# Patient Record
Sex: MALE | Race: WHITE | Employment: OTHER | URBAN - METROPOLITAN AREA
[De-identification: names, ages, dates, MRNs, and addresses within clinical notes are randomized per-mention and may not be internally consistent; named-entity substitution may affect disease eponyms.]

---

## 2021-11-13 ENCOUNTER — APPOINTMENT (OUTPATIENT)
Dept: CT IMAGING | Age: 76
DRG: 389 | End: 2021-11-13
Payer: MEDICARE

## 2021-11-13 ENCOUNTER — HOSPITAL ENCOUNTER (INPATIENT)
Age: 76
LOS: 4 days | Discharge: HOME | DRG: 389 | End: 2021-11-17
Attending: STUDENT IN AN ORGANIZED HEALTH CARE EDUCATION/TRAINING PROGRAM | Admitting: STUDENT IN AN ORGANIZED HEALTH CARE EDUCATION/TRAINING PROGRAM
Payer: MEDICARE

## 2021-11-13 ENCOUNTER — APPOINTMENT (OUTPATIENT)
Dept: GENERAL RADIOLOGY | Age: 76
DRG: 389 | End: 2021-11-13
Payer: MEDICARE

## 2021-11-13 DIAGNOSIS — K56.2 SIGMOID VOLVULUS (HCC): Primary | ICD-10-CM

## 2021-11-13 LAB
A/G RATIO: 1 (ref 1.1–2.2)
ALBUMIN SERPL-MCNC: 4.1 G/DL (ref 3.4–5)
ALP BLD-CCNC: 142 U/L (ref 40–129)
ALT SERPL-CCNC: 6 U/L (ref 10–40)
ANION GAP SERPL CALCULATED.3IONS-SCNC: 14 MMOL/L (ref 3–16)
AST SERPL-CCNC: 10 U/L (ref 15–37)
BASOPHILS ABSOLUTE: 0.1 K/UL (ref 0–0.2)
BASOPHILS RELATIVE PERCENT: 0.5 %
BILIRUB SERPL-MCNC: 0.5 MG/DL (ref 0–1)
BILIRUBIN URINE: NEGATIVE
BLOOD, URINE: ABNORMAL
BUN BLDV-MCNC: 27 MG/DL (ref 7–20)
CALCIUM SERPL-MCNC: 9.6 MG/DL (ref 8.3–10.6)
CHLORIDE BLD-SCNC: 107 MMOL/L (ref 99–110)
CLARITY: CLEAR
CO2: 17 MMOL/L (ref 21–32)
COLOR: YELLOW
COMMENT UA: ABNORMAL
CREAT SERPL-MCNC: 1.9 MG/DL (ref 0.8–1.3)
EOSINOPHILS ABSOLUTE: 0.1 K/UL (ref 0–0.6)
EOSINOPHILS RELATIVE PERCENT: 1 %
EPITHELIAL CELLS, UA: 1 /HPF (ref 0–5)
GFR AFRICAN AMERICAN: 42
GFR NON-AFRICAN AMERICAN: 34
GLUCOSE BLD-MCNC: 108 MG/DL (ref 70–99)
GLUCOSE BLD-MCNC: 120 MG/DL (ref 70–99)
GLUCOSE BLD-MCNC: 123 MG/DL (ref 70–99)
GLUCOSE BLD-MCNC: 92 MG/DL (ref 70–99)
GLUCOSE URINE: NEGATIVE MG/DL
HCT VFR BLD CALC: 32.4 % (ref 40.5–52.5)
HEMOGLOBIN: 10.6 G/DL (ref 13.5–17.5)
HYALINE CASTS: 12 /LPF (ref 0–8)
KETONES, URINE: NEGATIVE MG/DL
LEUKOCYTE ESTERASE, URINE: ABNORMAL
LYMPHOCYTES ABSOLUTE: 1.4 K/UL (ref 1–5.1)
LYMPHOCYTES RELATIVE PERCENT: 14 %
MAGNESIUM: 1.9 MG/DL (ref 1.8–2.4)
MCH RBC QN AUTO: 29.1 PG (ref 26–34)
MCHC RBC AUTO-ENTMCNC: 32.7 G/DL (ref 31–36)
MCV RBC AUTO: 89.2 FL (ref 80–100)
MICROSCOPIC EXAMINATION: YES
MONOCYTES ABSOLUTE: 0.6 K/UL (ref 0–1.3)
MONOCYTES RELATIVE PERCENT: 6.3 %
NEUTROPHILS ABSOLUTE: 7.8 K/UL (ref 1.7–7.7)
NEUTROPHILS RELATIVE PERCENT: 78.2 %
NITRITE, URINE: NEGATIVE
PDW BLD-RTO: 15.5 % (ref 12.4–15.4)
PERFORMED ON: ABNORMAL
PERFORMED ON: ABNORMAL
PERFORMED ON: NORMAL
PH UA: 5 (ref 5–8)
PLATELET # BLD: 219 K/UL (ref 135–450)
PMV BLD AUTO: 9.1 FL (ref 5–10.5)
POTASSIUM REFLEX MAGNESIUM: 3.3 MMOL/L (ref 3.5–5.1)
PROTEIN UA: 100 MG/DL
RBC # BLD: 3.63 M/UL (ref 4.2–5.9)
RBC UA: 1 /HPF (ref 0–4)
SARS-COV-2, NAAT: NOT DETECTED
SODIUM BLD-SCNC: 138 MMOL/L (ref 136–145)
SPECIFIC GRAVITY UA: 1.02 (ref 1–1.03)
TOTAL PROTEIN: 8.1 G/DL (ref 6.4–8.2)
TROPONIN: <0.01 NG/ML
URINE REFLEX TO CULTURE: YES
URINE TYPE: ABNORMAL
UROBILINOGEN, URINE: 0.2 E.U./DL
WBC # BLD: 10 K/UL (ref 4–11)
WBC UA: 20 /HPF (ref 0–5)

## 2021-11-13 PROCEDURE — 99153 MOD SED SAME PHYS/QHP EA: CPT | Performed by: INTERNAL MEDICINE

## 2021-11-13 PROCEDURE — 93005 ELECTROCARDIOGRAM TRACING: CPT | Performed by: STUDENT IN AN ORGANIZED HEALTH CARE EDUCATION/TRAINING PROGRAM

## 2021-11-13 PROCEDURE — 81001 URINALYSIS AUTO W/SCOPE: CPT

## 2021-11-13 PROCEDURE — 2580000003 HC RX 258: Performed by: PHYSICIAN ASSISTANT

## 2021-11-13 PROCEDURE — 2580000003 HC RX 258: Performed by: STUDENT IN AN ORGANIZED HEALTH CARE EDUCATION/TRAINING PROGRAM

## 2021-11-13 PROCEDURE — 99284 EMERGENCY DEPT VISIT MOD MDM: CPT

## 2021-11-13 PROCEDURE — 36415 COLL VENOUS BLD VENIPUNCTURE: CPT

## 2021-11-13 PROCEDURE — 1200000000 HC SEMI PRIVATE

## 2021-11-13 PROCEDURE — 80053 COMPREHEN METABOLIC PANEL: CPT

## 2021-11-13 PROCEDURE — 3609027000 HC COLONOSCOPY: Performed by: INTERNAL MEDICINE

## 2021-11-13 PROCEDURE — 87635 SARS-COV-2 COVID-19 AMP PRB: CPT

## 2021-11-13 PROCEDURE — 71260 CT THORAX DX C+: CPT

## 2021-11-13 PROCEDURE — 83735 ASSAY OF MAGNESIUM: CPT

## 2021-11-13 PROCEDURE — 6360000002 HC RX W HCPCS: Performed by: INTERNAL MEDICINE

## 2021-11-13 PROCEDURE — 7100000010 HC PHASE II RECOVERY - FIRST 15 MIN: Performed by: INTERNAL MEDICINE

## 2021-11-13 PROCEDURE — 6360000002 HC RX W HCPCS: Performed by: STUDENT IN AN ORGANIZED HEALTH CARE EDUCATION/TRAINING PROGRAM

## 2021-11-13 PROCEDURE — 2709999900 HC NON-CHARGEABLE SUPPLY: Performed by: INTERNAL MEDICINE

## 2021-11-13 PROCEDURE — 7100000011 HC PHASE II RECOVERY - ADDTL 15 MIN: Performed by: INTERNAL MEDICINE

## 2021-11-13 PROCEDURE — 6360000004 HC RX CONTRAST MEDICATION: Performed by: PHYSICIAN ASSISTANT

## 2021-11-13 PROCEDURE — 71045 X-RAY EXAM CHEST 1 VIEW: CPT

## 2021-11-13 PROCEDURE — 87086 URINE CULTURE/COLONY COUNT: CPT

## 2021-11-13 PROCEDURE — 84484 ASSAY OF TROPONIN QUANT: CPT

## 2021-11-13 PROCEDURE — 99152 MOD SED SAME PHYS/QHP 5/>YRS: CPT | Performed by: INTERNAL MEDICINE

## 2021-11-13 PROCEDURE — 0DJD8ZZ INSPECTION OF LOWER INTESTINAL TRACT, VIA NATURAL OR ARTIFICIAL OPENING ENDOSCOPIC: ICD-10-PCS | Performed by: INTERNAL MEDICINE

## 2021-11-13 PROCEDURE — 85025 COMPLETE CBC W/AUTO DIFF WBC: CPT

## 2021-11-13 RX ORDER — SODIUM CHLORIDE 0.9 % (FLUSH) 0.9 %
5-40 SYRINGE (ML) INJECTION PRN
Status: DISCONTINUED | OUTPATIENT
Start: 2021-11-13 | End: 2021-11-17 | Stop reason: HOSPADM

## 2021-11-13 RX ORDER — SERTRALINE HYDROCHLORIDE 25 MG/1
25 TABLET, FILM COATED ORAL DAILY
Status: ON HOLD | COMMUNITY
End: 2021-12-14

## 2021-11-13 RX ORDER — GLIPIZIDE 2.5 MG/1
2.5 TABLET, EXTENDED RELEASE ORAL DAILY
Status: ON HOLD | COMMUNITY
End: 2021-11-15 | Stop reason: ALTCHOICE

## 2021-11-13 RX ORDER — NICOTINE POLACRILEX 4 MG
15 LOZENGE BUCCAL PRN
Status: DISCONTINUED | OUTPATIENT
Start: 2021-11-13 | End: 2021-11-17 | Stop reason: HOSPADM

## 2021-11-13 RX ORDER — HEPARIN SODIUM 5000 [USP'U]/ML
5000 INJECTION, SOLUTION INTRAVENOUS; SUBCUTANEOUS EVERY 8 HOURS SCHEDULED
Status: DISCONTINUED | OUTPATIENT
Start: 2021-11-13 | End: 2021-11-17 | Stop reason: HOSPADM

## 2021-11-13 RX ORDER — MIDAZOLAM HYDROCHLORIDE 1 MG/ML
INJECTION INTRAMUSCULAR; INTRAVENOUS PRN
Status: DISCONTINUED | OUTPATIENT
Start: 2021-11-13 | End: 2021-11-13 | Stop reason: ALTCHOICE

## 2021-11-13 RX ORDER — FENTANYL CITRATE 50 UG/ML
INJECTION, SOLUTION INTRAMUSCULAR; INTRAVENOUS PRN
Status: DISCONTINUED | OUTPATIENT
Start: 2021-11-13 | End: 2021-11-13 | Stop reason: ALTCHOICE

## 2021-11-13 RX ORDER — SODIUM CHLORIDE 0.9 % (FLUSH) 0.9 %
5-40 SYRINGE (ML) INJECTION EVERY 12 HOURS SCHEDULED
Status: DISCONTINUED | OUTPATIENT
Start: 2021-11-13 | End: 2021-11-17 | Stop reason: HOSPADM

## 2021-11-13 RX ORDER — ACETAMINOPHEN 325 MG/1
650 TABLET ORAL EVERY 6 HOURS PRN
Status: DISCONTINUED | OUTPATIENT
Start: 2021-11-13 | End: 2021-11-17 | Stop reason: HOSPADM

## 2021-11-13 RX ORDER — ONDANSETRON 2 MG/ML
4 INJECTION INTRAMUSCULAR; INTRAVENOUS EVERY 6 HOURS PRN
Status: DISCONTINUED | OUTPATIENT
Start: 2021-11-13 | End: 2021-11-17 | Stop reason: HOSPADM

## 2021-11-13 RX ORDER — DEXTROSE MONOHYDRATE 50 MG/ML
100 INJECTION, SOLUTION INTRAVENOUS PRN
Status: DISCONTINUED | OUTPATIENT
Start: 2021-11-13 | End: 2021-11-17 | Stop reason: HOSPADM

## 2021-11-13 RX ORDER — 0.9 % SODIUM CHLORIDE 0.9 %
1000 INTRAVENOUS SOLUTION INTRAVENOUS ONCE
Status: COMPLETED | OUTPATIENT
Start: 2021-11-13 | End: 2021-11-13

## 2021-11-13 RX ORDER — DEXTROSE MONOHYDRATE 25 G/50ML
12.5 INJECTION, SOLUTION INTRAVENOUS PRN
Status: DISCONTINUED | OUTPATIENT
Start: 2021-11-13 | End: 2021-11-17 | Stop reason: HOSPADM

## 2021-11-13 RX ORDER — ALLOPURINOL 100 MG/1
100 TABLET ORAL DAILY
COMMUNITY

## 2021-11-13 RX ORDER — SODIUM CHLORIDE 9 MG/ML
25 INJECTION, SOLUTION INTRAVENOUS PRN
Status: DISCONTINUED | OUTPATIENT
Start: 2021-11-13 | End: 2021-11-17 | Stop reason: HOSPADM

## 2021-11-13 RX ORDER — ACETAMINOPHEN 650 MG/1
650 SUPPOSITORY RECTAL EVERY 6 HOURS PRN
Status: DISCONTINUED | OUTPATIENT
Start: 2021-11-13 | End: 2021-11-17 | Stop reason: HOSPADM

## 2021-11-13 RX ORDER — ONDANSETRON 4 MG/1
4 TABLET, ORALLY DISINTEGRATING ORAL EVERY 8 HOURS PRN
Status: DISCONTINUED | OUTPATIENT
Start: 2021-11-13 | End: 2021-11-17 | Stop reason: HOSPADM

## 2021-11-13 RX ORDER — SODIUM CHLORIDE, SODIUM LACTATE, POTASSIUM CHLORIDE, CALCIUM CHLORIDE 600; 310; 30; 20 MG/100ML; MG/100ML; MG/100ML; MG/100ML
INJECTION, SOLUTION INTRAVENOUS CONTINUOUS
Status: DISCONTINUED | OUTPATIENT
Start: 2021-11-13 | End: 2021-11-15

## 2021-11-13 RX ORDER — POLYETHYLENE GLYCOL 3350 17 G/17G
17 POWDER, FOR SOLUTION ORAL DAILY PRN
Status: DISCONTINUED | OUTPATIENT
Start: 2021-11-13 | End: 2021-11-16

## 2021-11-13 RX ORDER — OMEPRAZOLE 20 MG/1
20 CAPSULE, DELAYED RELEASE ORAL DAILY
Status: ON HOLD | COMMUNITY
End: 2022-09-28 | Stop reason: HOSPADM

## 2021-11-13 RX ORDER — ATORVASTATIN CALCIUM 20 MG/1
20 TABLET, FILM COATED ORAL EVERY EVENING
COMMUNITY

## 2021-11-13 RX ADMIN — SODIUM CHLORIDE 1000 ML: 9 INJECTION, SOLUTION INTRAVENOUS at 10:09

## 2021-11-13 RX ADMIN — HEPARIN SODIUM 5000 UNITS: 5000 INJECTION INTRAVENOUS; SUBCUTANEOUS at 20:39

## 2021-11-13 RX ADMIN — IOPAMIDOL 75 ML: 755 INJECTION, SOLUTION INTRAVENOUS at 09:31

## 2021-11-13 RX ADMIN — SODIUM CHLORIDE, POTASSIUM CHLORIDE, SODIUM LACTATE AND CALCIUM CHLORIDE: 600; 310; 30; 20 INJECTION, SOLUTION INTRAVENOUS at 18:50

## 2021-11-13 ASSESSMENT — PAIN DESCRIPTION - DESCRIPTORS
DESCRIPTORS: TIGHTNESS
DESCRIPTORS: TIGHTNESS

## 2021-11-13 ASSESSMENT — PAIN SCALES - WONG BAKER
WONGBAKER_NUMERICALRESPONSE: 0

## 2021-11-13 ASSESSMENT — PAIN DESCRIPTION - PAIN TYPE
TYPE: ACUTE PAIN
TYPE: ACUTE PAIN

## 2021-11-13 ASSESSMENT — PAIN DESCRIPTION - ONSET
ONSET: ON-GOING
ONSET: ON-GOING

## 2021-11-13 ASSESSMENT — PAIN DESCRIPTION - PROGRESSION
CLINICAL_PROGRESSION: NOT CHANGED
CLINICAL_PROGRESSION: NOT CHANGED

## 2021-11-13 ASSESSMENT — PAIN - FUNCTIONAL ASSESSMENT
PAIN_FUNCTIONAL_ASSESSMENT: ACTIVITIES ARE NOT PREVENTED
PAIN_FUNCTIONAL_ASSESSMENT: ACTIVITIES ARE NOT PREVENTED

## 2021-11-13 ASSESSMENT — PAIN DESCRIPTION - FREQUENCY
FREQUENCY: CONTINUOUS
FREQUENCY: CONTINUOUS

## 2021-11-13 ASSESSMENT — PAIN DESCRIPTION - LOCATION
LOCATION: CHEST
LOCATION: CHEST;ABDOMEN

## 2021-11-13 ASSESSMENT — PAIN SCALES - GENERAL
PAINLEVEL_OUTOF10: 8
PAINLEVEL_OUTOF10: 8
PAINLEVEL_OUTOF10: 0

## 2021-11-13 NOTE — ED NOTES
Pt presents to ED from home for complaints of chest pain, left ribcage area, on and off for past couple of days. Pt rates pain 8/10. Pt states that when he pushes on his hernia the pain feels better. Pt does have a hx of stroke and takes eliquis, right sided deficits. Pt is alert and oriented x4. No signs of acute distress noted. Call light within reach.       Dave Tran RN  11/13/21 0615

## 2021-11-13 NOTE — CONSULTS
Gastroenterology Consult Note                          Patient: Hoy Runner  : 1945  CSN#:      Date:  2021    Subjective:       History of Present Illness  Patient is a 64 y.o.  male admitted with Sigmoid volvulus ([facility] [K56.2] who is seen in consult for same. Patient with abd pain over last few days. Still having bowel movements up until yesterday. No previous colonoscopy. No bad surgery  Prior CVA. No constipation. Past Medical History:   Diagnosis Date    Cerebral artery occlusion with cerebral infarction ([facility]     Diabetes mellitus ([facility]     Hypertension       History reviewed. No pertinent surgical history. Past Endoscopic History: none    Admission Meds  No current facility-administered medications on file prior to encounter. Current Outpatient Medications on File Prior to Encounter   Medication Sig Dispense Refill    allopurinol (ZYLOPRIM) 100 MG tablet Take 100 mg by mouth daily      apixaban (ELIQUIS) 5 MG TABS tablet Take 5 mg by mouth 2 times daily      atorvastatin (LIPITOR) 40 MG tablet Take 40 mg by mouth daily      Cholecalciferol 50 MCG ( UT) TABS Take 50 mcg by mouth daily      glipiZIDE (GLUCOTROL XL) 2.5 MG extended release tablet Take 2.5 mg by mouth daily      omeprazole (PRILOSEC) 20 MG delayed release capsule Take 20 mg by mouth daily      sertraline (ZOLOFT) 25 MG tablet Take 25 mg by mouth daily             Allergies  No Known Allergies   Social   Social History     Tobacco Use    Smoking status: Never Smoker    Smokeless tobacco: Never Used   Substance Use Topics    Alcohol use: Not Currently        History reviewed. No pertinent family history. No family history of colon cancer, Crohn's disease, or ulcerative colitis. Review of Systems  Pertinent items are noted in HPI. Physical Exam  Blood pressure (!) 178/84, pulse 65, temperature 97.7 °F (36.5 °C), temperature source Oral, resp.  rate 16, weight 206 lb 5.6 oz (93.6 kg), SpO2 99 %. General appearance: alert, cooperative, no distress, appears stated age  Eyes: Anicteric  Head: Normocephalic, without obvious abnormality  Lungs: clear to auscultation bilaterally, Normal Effort  Heart: regular rate and rhythm, normal S1 and S2, no murmurs or rubs  Abdomen: soft, non-tender. Bowel sounds normal. No masses,  no organomegaly. Distention. Extremities: atraumatic, no cyanosis or edema  Skin: warm and dry, no jaundice  Neuro: Grossly intact, A&OX3, right hemiparesis      Data Review:    Recent Labs     11/13/21 0752   WBC 10.0   HGB 10.6*   HCT 32.4*   MCV 89.2        Recent Labs     11/13/21 0752      K 3.3*      CO2 17*   BUN 27*   CREATININE 1.9*     Recent Labs     11/13/21 0752   AST 10*   ALT 6*   BILITOT 0.5   ALKPHOS 142*     No results for input(s): LIPASE, AMYLASE in the last 72 hours. No results for input(s): PROTIME, INR in the last 72 hours. No results for input(s): PTT in the last 72 hours. No results for input(s): OCCULTBLD in the last 72 hours. CT:    1. No acute cardiopulmonary process.  Opacity seen on the chest x-ray for   caused by linear scarring in the right middle lobe and lingula   2. Sigmoid volvulus   3. Left adrenal nodule, likely adenoma in the absence of known malignancy                    Assessment:     Active Problems:    Sigmoid volvulus ([address]  Resolved Problems:    * No resolved hospital problems. *    Sigmoid volvulus    Recommendations:     Endoscopic management attempt with colonoscopy emergently. Discussed that the volvulus may recur and may need surgery ultimately.     Lynnette Christianson MD  [address] [facility] and [address] [place] [address]

## 2021-11-13 NOTE — H&P
Hospital Medicine History & Physical      PCP: Augustina Bernal, APRN - CNP    Date of Admission: 11/13/2021    Date of Service: Pt seen/examined on 11/13/21   and Admitted to Inpatient/. Chief Complaint:  Abdominal pain. History Of Present Illness: The patient is a 64 y.o. male who presents to [facility] with abdominal pain. 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 Presented with worsening abdominal pain for the past 3 days, patient describes colicky severe pain reaching 9/10 in severity, worse in the mid abdomen, associated with diarrhea and had been progressively worse over the last 3 days, no relieving factors, no blood noted in the stool, no nausea or vomiting, patient has no previous attacks of similar symptoms. Patient stated that he had been wheelchair-bound since  suffering from a stroke, lives with a friend that helps with his daily needs. Past Medical History:        Diagnosis Date    Cerebral artery occlusion with cerebral infarction ([facility]     Diabetes mellitus ([facility]     Hypertension        Past Surgical History:    History reviewed. No pertinent surgical history. Medications Prior to Admission:    Prior to Admission medications    Medication Sig Start Date End Date Taking?  Authorizing Provider   allopurinol (ZYLOPRIM) 100 MG tablet Take 100 mg by mouth daily    Historical Provider, MD   apixaban (ELIQUIS) 5 MG TABS tablet Take 5 mg by mouth 2 times daily    Historical Provider, MD   atorvastatin (LIPITOR) 40 MG tablet Take 40 mg by mouth daily    Historical Provider, MD   Cholecalciferol 50 MCG (2000 UT) TABS Take 50 mcg by mouth daily    Historical Provider, MD   glipiZIDE (GLUCOTROL XL) 2.5 MG extended release tablet Take 2.5 mg by mouth daily    Historical Provider, MD   omeprazole (PRILOSEC) 20 MG delayed release capsule Take 20 mg by mouth daily    Historical Provider, MD   sertraline (ZOLOFT) 25 MG tablet Take 25 mg by mouth daily    Historical Provider, MD       Allergies:  Patient has no known allergies. Social History:  The patient currently lives with a friend, wheelchair-bound at baseline    TOBACCO:   reports that he has never smoked. He has never used smokeless tobacco.  ETOH:   reports previous alcohol use. Family History:  Reviewed in detail and negative for DM, Early CAD, Cancer, CVA. Positive as follows:    History reviewed. No pertinent family history. REVIEW OF SYSTEMS:   Positive for abdominal pain, diarrhea and as noted in the HPI. All other systems reviewed and negative. PHYSICAL EXAM:    BP (!) 178/84   Pulse 65   Temp 97.7 °F (36.5 °C) (Oral)   Resp 16   Wt 206 lb 5.6 oz (93.6 kg)   SpO2 99%     General appearance: Seems uncomfortable  HEENT Normal cephalic, atraumatic without obvious deformity. Pupils equal, round, and reactive to light. Extra ocular muscles intact. Conjunctivae/corneas clear. Neck: Supple, No jugular venous distention/bruits. Trachea midline without thyromegaly or adenopathy with full range of motion. Lungs: Clear to auscultation, bilaterally without Rales/Wheezes/Rhonchi with good respiratory effort. Heart: Regular rate and rhythm with Normal S1/S2 without murmurs, rubs or gallops, point of maximum impulse non-displaced  Abdomen: Tympanic percussion note, tenderness most apparent but umbilical area  Extremities: Contractures noted in the right upper limb with muscle atrophy in the right lower limb. Skin: Skin color, texture, turgor normal.  No rashes or lesions. Neurologic: baseline right sided weakness. Mental status: Alert, oriented, thought content appropriate.   Capillary Refill: Acceptable  < 3 seconds  Peripheral Pulses: +3 Easily felt, not easily obliterated with pressure      CXR:  I have reviewed the CXR with the following interpretation: gas distention in the abdomen noted. CBC   Recent Labs     11/13/21 0752   WBC 10.0   HGB 10.6*   HCT 32.4*         RENAL  Recent Labs     11/13/21 0752      K 3.3*      CO2 17*   BUN 27*   CREATININE 1.9*     LFT'S  Recent Labs     11/13/21 0752   AST 10*   ALT 6*   BILITOT 0.5   ALKPHOS 142*     COAG  No results for input(s): INR in the last 72 hours. CARDIAC ENZYMES  Recent Labs     11/13/21 0752   TROPONINI <0.01       U/A:  No results found for: NITRITE, COLORU, WBCUA, RBCUA, MUCUS, BACTERIA, CLARITYU, SPECGRAV, LEUKOCYTESUR, BLOODU, GLUCOSEU, AMORPHOUS    ABG  No results found for: BBV3FAI, BEART, C8YIWEIE, PHART, THGBART, RQL1AIB, PO2ART, UQH8PAZ        There are no active hospital problems to display for this patient. PHYSICIANS CERTIFICATION:    I certify that Eva Villalpando is expected to be hospitalized for more  than 2 midnights based on the following assessment and plan:      ASSESSMENT/PLAN:    Abdominal pain   Sigmoid volvulus. Patient presented to emergency with progressively worsening abdominal pain, patient reported diarrhea however no nausea and vomiting. Upon presentation patient was found to be vitally stable. Exam with abdominal tenderness. CT scan showed sigmoid volvulus, general surgery and gastroenterology involved. Plan. -Appreciate gastroenterology input.  -Appreciate general surgery input. Plan-keep patient n.p.o.   -Hydration with LR    AF. On Eliquis, will hold for possible surgery. Patient has not taken any of his medications for the past 3 days. DM. Hold home glipizide. Insulin sliding scale. Gout. Hold allopurinol. Hyperlipidemia. Hold atorvastatin. Depression. Hold sertraline while patient n.p.o. History of CVA. residual right sided weakness, wheel chair bound.      DVT Prophylaxis: heparin  Diet: No diet orders on file  Code Status: No Order  PT/OT Eval Status: pending clinical improvement      Dispo - pending clinical improvement         Suzette Peralta MD    Thank you OBI Hwang CNP for the opportunity to be involved in this patient's care. If you have any questions or concerns please feel free to contact me at [phone].

## 2021-11-13 NOTE — ED PROVIDER NOTES
[address] [facility]        Pt Name: Beverly Collins  MRN: [id number]  [place] 1945  Date of evaluation: 11/13/2021  Provider: BARRON Chamberlain  PCP: OBI Kam CNP  Note Started: 9:05 AM EST     The ED Attending Physician was available for consultation but did not see or evaluate this patient. CHIEF COMPLAINT       Chief Complaint   Patient presents with    Chest Pain     past couple of days/ on and off/ pt states that when he pushes on hernia, pain is relieved       HISTORY OF PRESENT ILLNESS   (Location, Timing/Onset, Context/Setting, Quality, Duration, Modifying Factors, Severity, Associated Signs and Symptoms)  Note limiting factors. Chief Complaint: Abdominal pain and distention    Beverly Collins is a 64 y.o. male who presents with complaints of abdominal pain. Patient says the pain is in his abdomen, not his chest, and comes and goes over the last 3 days. He says it is unpredictable, and he has not noticed any exacerbating or alleviating factors. Says he has been eating normally. No nausea or vomiting. He has been having some diarrhea but no blood in it. Denies difficulty breathing, cough, cold symptoms, fever. Says he is urinating normally. Says he does have a hernia in his abdomen that has been there for a long time, never had surgery for it, and it has not really bothered him until recently. Nursing Notes were all reviewed and agreed with or any disagreements were addressed in the HPI. REVIEW OF SYSTEMS    (2-9 systems for level 4, 10 or more for level 5)     Review of Systems    Positives and pertinent negatives as per HPI. PAST MEDICAL HISTORY     Past Medical History:   Diagnosis Date    Cerebral artery occlusion with cerebral infarction ([facility]     Diabetes mellitus ([facility]     Hypertension        SURGICAL HISTORY   History reviewed. No pertinent surgical history.     [address] Previous Medications    ALLOPURINOL (ZYLOPRIM) 100 MG TABLET    Take 100 mg by mouth daily    APIXABAN (ELIQUIS) 5 MG TABS TABLET    Take 5 mg by mouth 2 times daily    ATORVASTATIN (LIPITOR) 40 MG TABLET    Take 40 mg by mouth daily    CHOLECALCIFEROL 50 MCG (2000 UT) TABS    Take 50 mcg by mouth daily    GLIPIZIDE (GLUCOTROL XL) 2.5 MG EXTENDED RELEASE TABLET    Take 2.5 mg by mouth daily    OMEPRAZOLE (PRILOSEC) 20 MG DELAYED RELEASE CAPSULE    Take 20 mg by mouth daily    SERTRALINE (ZOLOFT) 25 MG TABLET    Take 25 mg by mouth daily       ALLERGIES     Patient has no known allergies. FAMILYHISTORY     History reviewed. No pertinent family history. SOCIAL HISTORY       Social History     Tobacco Use    Smoking status: Never Smoker    Smokeless tobacco: Never Used   Substance Use Topics    Alcohol use: Not Currently    Drug use: Never       SCREENINGS           PHYSICAL EXAM    (up to 7 for level 4, 8 or more for level 5)     ED Triage Vitals [11/13/21 0739]   BP Temp Temp Source Pulse Resp SpO2 Height Weight   (!) 178/84 97.7 °F (36.5 °C) Oral 65 16 99 % -- 206 lb 5.6 oz (93.6 kg)       Physical Exam  Vitals and nursing note reviewed. Constitutional:       General: He is not in acute distress. Appearance: Normal appearance. He is not ill-appearing. HENT:      Head: Normocephalic and atraumatic. Nose: Nose normal.   Eyes:      General:         Right eye: No discharge. Left eye: No discharge. Cardiovascular:      Rate and Rhythm: Normal rate and regular rhythm. Heart sounds: Normal heart sounds. No murmur heard. No gallop. Pulmonary:      Effort: Pulmonary effort is normal. No respiratory distress. Breath sounds: Normal breath sounds. No stridor. No wheezing, rhonchi or rales. Abdominal:      General: Bowel sounds are normal. There is no distension. Palpations: Abdomen is soft. There is no mass. Tenderness: There is no abdominal tenderness.  There is no guarding or rebound. Comments: No palpable hernia   Musculoskeletal:         General: Normal range of motion. Cervical back: Normal range of motion. Skin:     General: Skin is warm and dry. Neurological:      General: No focal deficit present. Mental Status: He is alert and oriented to person, place, and time.    Psychiatric:         Mood and Affect: Mood normal.         Behavior: Behavior normal.         DIAGNOSTIC RESULTS   LABS:    Labs Reviewed   CBC WITH AUTO DIFFERENTIAL - Abnormal; Notable for the following components:       Result Value    RBC 3.63 (*)     Hemoglobin 10.6 (*)     Hematocrit 32.4 (*)     RDW 15.5 (*)     Neutrophils Absolute 7.8 (*)     All other components within normal limits    Narrative:     Performed at:  [address] [employer] [address]   Phone [phone]   COMPREHENSIVE METABOLIC PANEL W/ REFLEX TO MG FOR LOW K - Abnormal; Notable for the following components:    Potassium reflex Magnesium 3.3 (*)     CO2 17 (*)     Glucose 123 (*)     BUN 27 (*)     CREATININE 1.9 (*)     GFR Non- 34 (*)     GFR African American 42 (*)     Albumin/Globulin Ratio 1.0 (*)     Alkaline Phosphatase 142 (*)     ALT 6 (*)     AST 10 (*)     All other components within normal limits    Narrative:     Performed at:  [facility]  [address] [facility] [employer] [address]   Phone [phone], RAPID    Narrative:     Performed at:  [facility]  [address] [facility] [employer] [address]   Phone [phone]   TROPONIN    Narrative:     Performed at:  [facility]  [address] [facility] [employer] [address]   Phone [phone]   MAGNESIUM    Narrative:     Performed at:  [facility]  [address] [facility] [employer] [address]   Phone [phone]   URINE RT REFLEX TO CULTURE       When ordered only abnormal lab results are displayed. All other labs were within normal range or not returned as of this dictation. EKG: When ordered, EKG's are interpreted by the Emergency Department Physician in the absence of a cardiologist.  Please see their note for interpretation of EKG. RADIOLOGY:   Non-plain film images such as CT, Ultrasound and MRI are read by the radiologist. Plain radiographic images are visualized and preliminarily interpreted by the ED Provider with the below findings:    Interpretation per the Radiologist below, if available at the time of this note:    CT CHEST [address]   Final Result   1. No acute cardiopulmonary process. Opacity seen on the chest x-ray for   caused by linear scarring in the right middle lobe and lingula   2. Sigmoid volvulus   3. Left adrenal nodule, likely adenoma in the absence of known malignancy         XR CHEST PORTABLE   Final Result   Opacities in the lower lung zones suspicious for viral pattern of pneumonia. No definite findings of CHF             CONSULTS:  IP CONSULT TO GENERAL SURGERY  IP CONSULT TO GI    PROCEDURES   Unless otherwise noted below, none. Procedures    EMERGENCY DEPARTMENT COURSE and DIFFERENTIAL DIAGNOSIS/MDM:   Vitals:    Vitals:    11/13/21 0739   BP: (!) 178/84   Pulse: 65   Resp: 16   Temp: 97.7 °F (36.5 °C)   TempSrc: Oral   SpO2: 99%   Weight: 206 lb 5.6 oz (93.6 kg)       Patient was given the following medications:  Medications   0.9 % sodium chloride bolus (0 mLs IntraVENous Stopped 11/13/21 1137)   iopamidol (ISOVUE-370) 76 % injection 75 mL (75 mLs IntraVENous Given 11/13/21 0931)           Patient's physical exam was essentially benign. Laboratory work-up showed slight worsening of the patient's chronic kidney disease, no other acute findings. Rapid COVID-19 test negative. CT scan the abdomen pelvis revealed sigmoid volvulus.   I consulted the general surgeon on-call,  Kalani Woodard, who visited the patient in the ED and advised GI consultation. I then consulted the gastroenterologist on-call, who said he would likely take the patient later this afternoon for attempt at detorsion. I then consulted the hospitalist, who agreed to accept and admit the patient. CRITICAL CARE TIME   CRITICAL CARE: There was a high probability of clinically significant/life threatening deterioration in this patient's condition which required my urgent intervention. Total critical care time was 15 minutes. This excludes any time for separately reportable procedures. FINAL IMPRESSION      1. Sigmoid volvulus ([facility]          DISPOSITION/PLAN   DISPOSITION Admitted 11/13/2021 01:22:20 PM      PATIENT REFERRED TO:  No follow-up provider specified.     DISCHARGE MEDICATIONS:  New Prescriptions    No medications on file       DISCONTINUED MEDICATIONS:  Discontinued Medications    No medications on file            (Please note that portions of this note were completed with a voice recognition program.  Efforts were made to edit the dictations but occasionally words are mis-transcribed.)    BARRON Wood (electronically signed)       Marilu Wood  11/13/21 7234

## 2021-11-13 NOTE — PROGRESS NOTES
General Surgery    Called regarding sigmoid volvulus    Patient seen and examined in ER    CT reviewed    Abdomen is distended, non tender, no sign of peritonitis    Recommend evaluation by GI service for possible endoscopic decompression    No sign of peritonitis on exam today   Will need serial exams    On Eliquis   Hold for now in case surgical intervention is needed    Will follow along    Electronically signed by Kaushik Holt MD on 11/13/2021 at 12:12 PM

## 2021-11-13 NOTE — ED NOTES
Patient resting comfortably in bed, no signs of acute distress noted. Pt denies any needs at this time. Call light within reach.      Haider Brewster RN  11/13/21 2810

## 2021-11-13 NOTE — OP NOTE
Endoscopy Note    Patient: Lisseth Coffman  : 1945  Acct#:     Procedure: Colonoscopy to ascending colon                         Date:  2021     Surgeon:   Dillon Faulkner MD    Referring Physician:  Jacob Bui APRN - CNP    Indications: This is a 64y.o. year old male who presents today with sigmoid volvulus    Postoperative Diagnosis:  Sigmoid volvulus    Anesthesia:  Versed 2 mg IV, Fentanyl 25mcg    Consent:  The patient or their legal guardian has signed an informed consent, and is aware of the potential risks, benefits, alternatives, and potential complications of this procedure. These include, but are not limited to hemorrhage, bleeding, post procedural pain, perforation, phlebitis, aspiration, hypotension, hypoxia, cardiovascular events such as arryhthmia, and possibly death. Description of Procedure: The patient was then taken to the endoscopy suite, placed in the left lateral decubitus position and the above IV sedation was administrered. Rectal exam was normal    The scope was placed thru the anus and advanced to the ascending colon. Unprepped colon with solid stool encountered. Scope was withdrawn as the mucosa was examined. Significant irrigation and suction performed. Findings: The mucosa was gross ly normal and viable. The was a volvulus in the sigmoid colon which was seen to revolve on its linear axis and straighten. The scope was then removed. Estimated Blood Loss (mL):  Minimal    Complications: None    Specimens: * No specimens in log *    The patient tolerated the procedure well and was taken to the post anesthesia care unit in good condition. Impression:   1) See post procedure diagnoses    Recommendations:   1) Clear liquid diet.   2) Surgical follow up        Dillon Faulkner MD  [address] and [address] [facility]

## 2021-11-13 NOTE — ED NOTES
Patient to endo for colonoscopy at this time. Report given to endo RN, all questions answered. Pt is alert and oriented x4. No signs of acute distress noted en route. IV normal saline locked. External catheter remains in place. Pt updated on plan of care, all questions answered.       Danilo Saunders RN  11/13/21 2674

## 2021-11-13 NOTE — H&P
Gastroenterology Note             Pre-operative History and Physical    Patient: Mari Lutz  : 1945  CSN:     History Obtained From:  patient and/or guardian. HISTORY OF PRESENT ILLNESS:    The patient is a 64 y.o. male  here for colonoscopy. Past Medical History:    Past Medical History:   Diagnosis Date    Cerebral artery occlusion with cerebral infarction ([facility] [address]     [facility]     Hypertension      Past Surgical History:    History reviewed. No pertinent surgical history. Medications Prior to Admission:   No current facility-administered medications on file prior to encounter. Current Outpatient Medications on File Prior to Encounter   Medication Sig Dispense Refill    allopurinol (ZYLOPRIM) 100 MG tablet Take 100 mg by mouth daily      apixaban (ELIQUIS) 5 MG TABS tablet Take 5 mg by mouth 2 times daily      atorvastatin (LIPITOR) 40 MG tablet Take 40 mg by mouth daily      Cholecalciferol 50 MCG ( UT) TABS Take 50 mcg by mouth daily      glipiZIDE (GLUCOTROL XL) 2.5 MG extended release tablet Take 2.5 mg by mouth daily      omeprazole (PRILOSEC) 20 MG delayed release capsule Take 20 mg by mouth daily      sertraline (ZOLOFT) 25 MG tablet Take 25 mg by mouth daily          Allergies:  Patient has no known allergies. Social History:   Social History     Tobacco Use    Smoking status: Never Smoker    Smokeless tobacco: Never Used   Substance Use Topics    Alcohol use: Not Currently     Family History:   History reviewed. No pertinent family history.     PHYSICAL EXAM:      BP (!) 198/95   Pulse 65   Temp 98 °F (36.7 °C) (Oral)   Resp 16   Wt 206 lb 5.6 oz (93.6 kg)   SpO2 100%  I        Heart:   RRR, normal s1s2    Lungs:  CTA bilat,  Normal effort    Abdomen:   NT, distended      ASA Grade:  ASA 3 - Patient with moderate systemic disease with functional limitations    Mallampati Class:  Class I: Soft palate, uvula, fauces, pillars visible  __________  Class II: Soft palate, uvula, fauces visible  ____x______   Class III: Soft palate, base of uvula visible  __________  Class IV: Hard palate only visible   __________        ASSESSMENT AND PLAN:    1. Patient is a 64 y.o. male here for colonoscopy with MAC.   2.  Procedure options, risks and benefits reviewed with patient. Patient expresses understanding.       Mitchell Chan MD  [address] and [address] [place] [address]

## 2021-11-13 NOTE — ED NOTES
Report called to 4 N RN, all questions answered. Lazaro fall/ pain discussed.       Doug Ya, RN  11/13/21 6978

## 2021-11-13 NOTE — ED NOTES
Pt incontinent of urine. Pericare provided to pt. Linens changed. External catheter applied.       Danilo Saunders RN  11/13/21 7813

## 2021-11-13 NOTE — ED NOTES
Bed: B-07  Expected date: 11/13/21  Expected time: 7:04 AM  Means of arrival:   Comments:  [address], RN  11/13/21 6362

## 2021-11-13 NOTE — PROGRESS NOTES
Patient arrived to room 4252. Alert and oriented x4. History of CVA, R side flaccid. , VSS. Condom cath in place for incontinence. No skin issues noted. Tenderness noted in LUQ. Clear liquid diet per GI. Oriented to room and call light.

## 2021-11-14 LAB
ANION GAP SERPL CALCULATED.3IONS-SCNC: 13 MMOL/L (ref 3–16)
BASOPHILS ABSOLUTE: 0 K/UL (ref 0–0.2)
BASOPHILS RELATIVE PERCENT: 0.7 %
BUN BLDV-MCNC: 29 MG/DL (ref 7–20)
CALCIUM SERPL-MCNC: 8.4 MG/DL (ref 8.3–10.6)
CHLORIDE BLD-SCNC: 111 MMOL/L (ref 99–110)
CO2: 15 MMOL/L (ref 21–32)
CREAT SERPL-MCNC: 2 MG/DL (ref 0.8–1.3)
EKG ATRIAL RATE: 65 BPM
EKG DIAGNOSIS: NORMAL
EKG P AXIS: 57 DEGREES
EKG P-R INTERVAL: 216 MS
EKG Q-T INTERVAL: 400 MS
EKG QRS DURATION: 92 MS
EKG QTC CALCULATION (BAZETT): 416 MS
EKG R AXIS: -26 DEGREES
EKG T AXIS: -6 DEGREES
EKG VENTRICULAR RATE: 65 BPM
EOSINOPHILS ABSOLUTE: 0.2 K/UL (ref 0–0.6)
EOSINOPHILS RELATIVE PERCENT: 3 %
GFR AFRICAN AMERICAN: 39
GFR NON-AFRICAN AMERICAN: 32
GLUCOSE BLD-MCNC: 109 MG/DL (ref 70–99)
GLUCOSE BLD-MCNC: 74 MG/DL (ref 70–99)
GLUCOSE BLD-MCNC: 81 MG/DL (ref 70–99)
GLUCOSE BLD-MCNC: 84 MG/DL (ref 70–99)
GLUCOSE BLD-MCNC: 88 MG/DL (ref 70–99)
GLUCOSE BLD-MCNC: 91 MG/DL (ref 70–99)
GLUCOSE BLD-MCNC: 99 MG/DL (ref 70–99)
HCT VFR BLD CALC: 28.3 % (ref 40.5–52.5)
HEMOGLOBIN: 9.2 G/DL (ref 13.5–17.5)
LYMPHOCYTES ABSOLUTE: 1.6 K/UL (ref 1–5.1)
LYMPHOCYTES RELATIVE PERCENT: 22 %
MCH RBC QN AUTO: 28.9 PG (ref 26–34)
MCHC RBC AUTO-ENTMCNC: 32.4 G/DL (ref 31–36)
MCV RBC AUTO: 89.2 FL (ref 80–100)
MONOCYTES ABSOLUTE: 0.6 K/UL (ref 0–1.3)
MONOCYTES RELATIVE PERCENT: 8.5 %
NEUTROPHILS ABSOLUTE: 4.7 K/UL (ref 1.7–7.7)
NEUTROPHILS RELATIVE PERCENT: 65.8 %
PDW BLD-RTO: 15.4 % (ref 12.4–15.4)
PERFORMED ON: ABNORMAL
PERFORMED ON: NORMAL
PLATELET # BLD: 175 K/UL (ref 135–450)
PMV BLD AUTO: 8.7 FL (ref 5–10.5)
POTASSIUM SERPL-SCNC: 2.9 MMOL/L (ref 3.5–5.1)
RBC # BLD: 3.17 M/UL (ref 4.2–5.9)
SODIUM BLD-SCNC: 139 MMOL/L (ref 136–145)
URINE CULTURE, ROUTINE: NORMAL
WBC # BLD: 7.1 K/UL (ref 4–11)

## 2021-11-14 PROCEDURE — 85025 COMPLETE CBC W/AUTO DIFF WBC: CPT

## 2021-11-14 PROCEDURE — 2580000003 HC RX 258: Performed by: STUDENT IN AN ORGANIZED HEALTH CARE EDUCATION/TRAINING PROGRAM

## 2021-11-14 PROCEDURE — 1200000000 HC SEMI PRIVATE

## 2021-11-14 PROCEDURE — 36415 COLL VENOUS BLD VENIPUNCTURE: CPT

## 2021-11-14 PROCEDURE — 6360000002 HC RX W HCPCS: Performed by: STUDENT IN AN ORGANIZED HEALTH CARE EDUCATION/TRAINING PROGRAM

## 2021-11-14 PROCEDURE — 93010 ELECTROCARDIOGRAM REPORT: CPT | Performed by: INTERNAL MEDICINE

## 2021-11-14 PROCEDURE — 80048 BASIC METABOLIC PNL TOTAL CA: CPT

## 2021-11-14 PROCEDURE — 94760 N-INVAS EAR/PLS OXIMETRY 1: CPT

## 2021-11-14 RX ORDER — POTASSIUM CHLORIDE 7.45 MG/ML
10 INJECTION INTRAVENOUS PRN
Status: DISCONTINUED | OUTPATIENT
Start: 2021-11-14 | End: 2021-11-17 | Stop reason: HOSPADM

## 2021-11-14 RX ADMIN — SODIUM CHLORIDE, POTASSIUM CHLORIDE, SODIUM LACTATE AND CALCIUM CHLORIDE: 600; 310; 30; 20 INJECTION, SOLUTION INTRAVENOUS at 23:48

## 2021-11-14 RX ADMIN — Medication 10 ML: at 20:18

## 2021-11-14 RX ADMIN — Medication 10 MEQ: at 17:18

## 2021-11-14 RX ADMIN — HEPARIN SODIUM 5000 UNITS: 5000 INJECTION INTRAVENOUS; SUBCUTANEOUS at 06:45

## 2021-11-14 RX ADMIN — Medication 10 MEQ: at 13:17

## 2021-11-14 RX ADMIN — Medication 10 MEQ: at 18:15

## 2021-11-14 RX ADMIN — HEPARIN SODIUM 5000 UNITS: 5000 INJECTION INTRAVENOUS; SUBCUTANEOUS at 15:06

## 2021-11-14 RX ADMIN — HEPARIN SODIUM 5000 UNITS: 5000 INJECTION INTRAVENOUS; SUBCUTANEOUS at 20:18

## 2021-11-14 RX ADMIN — Medication 10 MEQ: at 14:55

## 2021-11-14 RX ADMIN — SODIUM CHLORIDE, POTASSIUM CHLORIDE, SODIUM LACTATE AND CALCIUM CHLORIDE: 600; 310; 30; 20 INJECTION, SOLUTION INTRAVENOUS at 05:36

## 2021-11-14 RX ADMIN — Medication 10 MEQ: at 16:05

## 2021-11-14 ASSESSMENT — PAIN SCALES - WONG BAKER
WONGBAKER_NUMERICALRESPONSE: 0

## 2021-11-14 ASSESSMENT — PAIN SCALES - GENERAL
PAINLEVEL_OUTOF10: 0
PAINLEVEL_OUTOF10: 0

## 2021-11-14 NOTE — PROGRESS NOTES
Gastroenterology Progress Note          GI Progress Note    Admit Date: 11/13/2021    Subjective:       Patient feeling well. Reports significant improvement in pain.  No bowel movement or flatus this am.        Objective:       Patient Vitals for the past 24 hrs:   BP Temp Temp src Pulse Resp SpO2 Weight   11/14/21 0526 (!) 179/90 97.5 °F (36.4 °C) Oral 60 16 97 % 205 lb 14.6 oz (93.4 kg)   11/13/21 2007 -- -- -- 52 -- 99 % --   11/13/21 1928 (!) 166/76 97.5 °F (36.4 °C) Oral (!) 49 16 98 % --   11/13/21 1754 (!) 166/86 98 °F (36.7 °C) Axillary 71 16 96 % --   11/13/21 1730 -- -- -- 55 22 100 % --   11/13/21 1729 (!) 183/73 -- -- 53 16 100 % --   11/13/21 1728 -- -- -- 54 15 100 % --   11/13/21 1727 (!) 180/90 -- -- 54 18 100 % --   11/13/21 1726 -- -- -- 58 20 100 % --   11/13/21 1725 -- -- -- 57 19 100 % --   11/13/21 1724 (!) 177/76 -- -- 55 18 100 % --   11/13/21 1723 -- -- -- 57 19 100 % --   11/13/21 1722 -- -- -- 57 19 100 % --   11/13/21 1721 (!) 200/87 -- -- 58 16 100 % --   11/13/21 1720 -- -- -- 63 19 100 % --   11/13/21 1719 (!) 217/84 -- -- 62 19 99 % --   11/13/21 1718 -- -- -- 63 18 100 % --   11/13/21 1717 -- -- -- 69 20 99 % --   11/13/21 1716 -- -- -- 63 13 100 % --   11/13/21 1715 (!) 174/97 -- -- 67 20 99 % --   11/13/21 1714 -- -- -- 63 18 99 % --   11/13/21 1713 -- -- -- 59 18 99 % --   11/13/21 1712 (!) 176/85 -- -- 61 18 99 % --   11/13/21 1711 -- -- -- 62 19 99 % --   11/13/21 1710 (!) 177/97 -- -- 66 14 100 % --   11/13/21 1709 -- -- -- 62 23 98 % --   11/13/21 1708 -- -- -- 60 21 100 % --   11/13/21 1646 (!) 198/95 98 °F (36.7 °C) Oral 65 16 100 % --   11/13/21 1631 (!) 207/83 -- -- 58 15 98 % --   11/13/21 1616 (!) 203/85 -- -- 61 15 99 % --   11/13/21 1531 (!) 197/82 -- -- 64 13 98 % --   11/13/21 1516 (!) 193/81 -- -- 59 15 97 % --   11/13/21 1446 (!) 173/84 -- -- 59 26 98 % --   11/13/21 1431 (!) 192/83 -- -- 58 19 98 % --   11/13/21 1416 (!) 179/82 -- -- 60 16 99 % -- 11/13/21 0739 (!) 178/84 97.7 °F (36.5 °C) Oral 65 16 99 % 206 lb 5.6 oz (93.6 kg)       Exam:    Significant improvement in distention  No tenderness    Labs:  Recent Labs     11/13/21 0752   WBC 10.0   HGB 10.6*   HCT 32.4*   MCV 89.2        Recent Labs     11/13/21 0752      K 3.3*      CO2 17*   BUN 27*   CREATININE 1.9*     Recent Labs     11/13/21 0752   AST 10*   ALT 6*   BILITOT 0.5   ALKPHOS 142*     No results for input(s): LIPASE, AMYLASE in the last 72 hours. Recent Labs     11/13/21 0752   PROT 8.1     No results for input(s): PTT in the last 72 hours. No results for input(s): OCCULTBLD in the last 72 hours. Assessment:       Active Problems:    Sigmoid volvulus (HCC)  Resolved Problems:    * No resolved hospital problems. *    Sigmoid volvulus      Recommendations: Follow clinically for look for return of bowel function.     Jackson Ann MD  [address] and [address] [place] [address]

## 2021-11-14 NOTE — PROGRESS NOTES
General Surgery    Noted endoscopy findings, successful intervention for sigmoid volvulus    Patient resting comfortably    Agree with PO trial and close monitoring of GI function    Likely to recur at some point so will discuss colectomy when more awake    No urgent surgical intervention at this time    Electronically signed by Marybel Hartman MD on 11/14/2021 at 10:08 AM

## 2021-11-14 NOTE — PROGRESS NOTES
Conjunctivae/corneas clear. Neck: Supple, No jugular venous distention/bruits. Trachea midline without thyromegaly or adenopathy with full range of motion. Lungs: Clear to auscultation, bilaterally without Rales/Wheezes/Rhonchi with good respiratory effort. Heart: Regular rate and rhythm with Normal S1/S2 without murmurs, rubs or gallops, point of maximum impulse non-displaced  Abdomen: improved tenderness   Extremities: Contractures noted in the right upper limb with muscle atrophy in the right lower limb. Skin: Skin color, texture, turgor normal.  No rashes or lesions. Neurologic: baseline right sided weakness. Mental status: Alert, oriented, thought content appropriate. Capillary Refill: Acceptable  < 3 seconds  Peripheral Pulses: +3 Easily felt, not easily obliterated with pressure         Labs:   Recent Labs     11/13/21  0752 11/14/21  0725   WBC 10.0 7.1   HGB 10.6* 9.2*   HCT 32.4* 28.3*    175     Recent Labs     11/13/21  0752 11/14/21  0725    139   K 3.3* 2.9*    111*   CO2 17* 15*   BUN 27* 29*   CREATININE 1.9* 2.0*   CALCIUM 9.6 8.4     Recent Labs     11/13/21  0752   AST 10*   ALT 6*   BILITOT 0.5   ALKPHOS 142*     No results for input(s): INR in the last 72 hours. Recent Labs     11/13/21  0752   TROPONINI <0.01       Urinalysis:      Lab Results   Component Value Date    NITRU Negative 11/13/2021    WBCUA 20 11/13/2021    RBCUA 1 11/13/2021    BLOODU SMALL 11/13/2021    SPECGRAV 1.025 11/13/2021    GLUCOSEU Negative 11/13/2021       Radiology:  CT CHEST ABDOMEN PELVIS W CONTRAST   Final Result   1. No acute cardiopulmonary process. Opacity seen on the chest x-ray for   caused by linear scarring in the right middle lobe and lingula   2. Sigmoid volvulus   3. Left adrenal nodule, likely adenoma in the absence of known malignancy         XR CHEST PORTABLE   Final Result   Opacities in the lower lung zones suspicious for viral pattern of pneumonia.    No definite findings of CHF                 Assessment/Plan:    Active Hospital Problems    Diagnosis     Sigmoid volvulus ([facility] [address] [K56.2]    Abdominal pain   Sigmoid volvulus. Patient presented to emergency with progressively worsening abdominal pain, patient reported diarrhea however no nausea and vomiting. Upon presentation patient was found to be vitally stable. Exam with abdominal tenderness. CT scan showed sigmoid volvulus, general surgery and gastroenterology involved. Underwent colonoscopic treatment of Edel Sood successfully 11/13   Plan. -Appreciate gastroenterology input.  -Appreciate general surgery input. ( patient may still need surgery). -advance to liquid diet   -Hydration with LR     AF. On Eliquis, will hold for possible surgery. Patient has not taken any of his medications for the past 3 days.      DM. Hold home glipizide. Insulin sliding scale.     Gout. Hold allopurinol.     Hyperlipidemia. Hold atorvastatin.     Depression. Hold sertraline while patient n.p.o.     History of CVA. residual right sided weakness, wheel chair bound.          DVT Prophylaxis: heparin  Diet: ADULT DIET; Clear Liquid  Code Status: Full Code    PT/OT Eval Status: wheelchair bound at baseline.      Dispo - pending clinical improvement      Lydia Mireles MD

## 2021-11-15 LAB
ANION GAP SERPL CALCULATED.3IONS-SCNC: 17 MMOL/L (ref 3–16)
BASOPHILS ABSOLUTE: 0 K/UL (ref 0–0.2)
BASOPHILS RELATIVE PERCENT: 0.8 %
BUN BLDV-MCNC: 18 MG/DL (ref 7–20)
CALCIUM SERPL-MCNC: 7.5 MG/DL (ref 8.3–10.6)
CHLORIDE BLD-SCNC: 107 MMOL/L (ref 99–110)
CO2: 12 MMOL/L (ref 21–32)
CREAT SERPL-MCNC: 1.1 MG/DL (ref 0.8–1.3)
EOSINOPHILS ABSOLUTE: 0.2 K/UL (ref 0–0.6)
EOSINOPHILS RELATIVE PERCENT: 2.9 %
GFR AFRICAN AMERICAN: >60
GFR NON-AFRICAN AMERICAN: >60
GLUCOSE BLD-MCNC: 106 MG/DL (ref 70–99)
GLUCOSE BLD-MCNC: 113 MG/DL (ref 70–99)
GLUCOSE BLD-MCNC: 55 MG/DL (ref 70–99)
GLUCOSE BLD-MCNC: 74 MG/DL (ref 70–99)
GLUCOSE BLD-MCNC: 79 MG/DL (ref 70–99)
GLUCOSE BLD-MCNC: 83 MG/DL (ref 70–99)
GLUCOSE BLD-MCNC: 94 MG/DL (ref 70–99)
HCT VFR BLD CALC: 23.1 % (ref 40.5–52.5)
HEMOGLOBIN: 7.5 G/DL (ref 13.5–17.5)
LYMPHOCYTES ABSOLUTE: 1 K/UL (ref 1–5.1)
LYMPHOCYTES RELATIVE PERCENT: 17.7 %
MCH RBC QN AUTO: 29 PG (ref 26–34)
MCHC RBC AUTO-ENTMCNC: 32.5 G/DL (ref 31–36)
MCV RBC AUTO: 89.3 FL (ref 80–100)
MONOCYTES ABSOLUTE: 0.4 K/UL (ref 0–1.3)
MONOCYTES RELATIVE PERCENT: 7.2 %
NEUTROPHILS ABSOLUTE: 4.2 K/UL (ref 1.7–7.7)
NEUTROPHILS RELATIVE PERCENT: 71.4 %
PDW BLD-RTO: 15.1 % (ref 12.4–15.4)
PERFORMED ON: ABNORMAL
PERFORMED ON: ABNORMAL
PERFORMED ON: NORMAL
PLATELET # BLD: 154 K/UL (ref 135–450)
PMV BLD AUTO: 9.1 FL (ref 5–10.5)
POTASSIUM SERPL-SCNC: 3.3 MMOL/L (ref 3.5–5.1)
RBC # BLD: 2.59 M/UL (ref 4.2–5.9)
SODIUM BLD-SCNC: 136 MMOL/L (ref 136–145)
WBC # BLD: 5.9 K/UL (ref 4–11)

## 2021-11-15 PROCEDURE — 6370000000 HC RX 637 (ALT 250 FOR IP): Performed by: STUDENT IN AN ORGANIZED HEALTH CARE EDUCATION/TRAINING PROGRAM

## 2021-11-15 PROCEDURE — 6360000002 HC RX W HCPCS: Performed by: STUDENT IN AN ORGANIZED HEALTH CARE EDUCATION/TRAINING PROGRAM

## 2021-11-15 PROCEDURE — APPSS45 APP SPLIT SHARED TIME 31-45 MINUTES: Performed by: PHYSICIAN ASSISTANT

## 2021-11-15 PROCEDURE — 2580000003 HC RX 258: Performed by: STUDENT IN AN ORGANIZED HEALTH CARE EDUCATION/TRAINING PROGRAM

## 2021-11-15 PROCEDURE — 80048 BASIC METABOLIC PNL TOTAL CA: CPT

## 2021-11-15 PROCEDURE — 94761 N-INVAS EAR/PLS OXIMETRY MLT: CPT

## 2021-11-15 PROCEDURE — 85025 COMPLETE CBC W/AUTO DIFF WBC: CPT

## 2021-11-15 PROCEDURE — APPNB45 APP NON BILLABLE 31-45 MINUTES: Performed by: PHYSICIAN ASSISTANT

## 2021-11-15 PROCEDURE — 36415 COLL VENOUS BLD VENIPUNCTURE: CPT

## 2021-11-15 PROCEDURE — 1200000000 HC SEMI PRIVATE

## 2021-11-15 RX ORDER — LANOLIN ALCOHOL/MO/W.PET/CERES
325 CREAM (GRAM) TOPICAL 2 TIMES DAILY
COMMUNITY

## 2021-11-15 RX ORDER — ATORVASTATIN CALCIUM 40 MG/1
40 TABLET, FILM COATED ORAL DAILY
Status: DISCONTINUED | OUTPATIENT
Start: 2021-11-15 | End: 2021-11-17 | Stop reason: HOSPADM

## 2021-11-15 RX ORDER — DILTIAZEM HYDROCHLORIDE 180 MG/1
180 CAPSULE, EXTENDED RELEASE ORAL DAILY
Status: DISCONTINUED | OUTPATIENT
Start: 2021-11-15 | End: 2021-11-15 | Stop reason: CLARIF

## 2021-11-15 RX ORDER — DILTIAZEM HYDROCHLORIDE 180 MG/1
180 CAPSULE, COATED, EXTENDED RELEASE ORAL DAILY
Status: DISCONTINUED | OUTPATIENT
Start: 2021-11-15 | End: 2021-11-17 | Stop reason: HOSPADM

## 2021-11-15 RX ORDER — POTASSIUM CHLORIDE 20 MEQ/1
20 TABLET, EXTENDED RELEASE ORAL 2 TIMES DAILY
Status: ON HOLD | COMMUNITY
End: 2022-09-28 | Stop reason: HOSPADM

## 2021-11-15 RX ORDER — DILTIAZEM HYDROCHLORIDE 180 MG/1
180 CAPSULE, EXTENDED RELEASE ORAL DAILY
Status: DISCONTINUED | OUTPATIENT
Start: 2021-11-15 | End: 2021-11-15

## 2021-11-15 RX ORDER — DILTIAZEM HYDROCHLORIDE 120 MG/1
120 CAPSULE, EXTENDED RELEASE ORAL DAILY
COMMUNITY

## 2021-11-15 RX ORDER — SERTRALINE HYDROCHLORIDE 25 MG/1
25 TABLET, FILM COATED ORAL DAILY
Status: DISCONTINUED | OUTPATIENT
Start: 2021-11-15 | End: 2021-11-17 | Stop reason: HOSPADM

## 2021-11-15 RX ORDER — CLONIDINE 0.2 MG/24H
1 PATCH, EXTENDED RELEASE TRANSDERMAL WEEKLY
Status: ON HOLD | COMMUNITY
End: 2021-12-14

## 2021-11-15 RX ORDER — DOCUSATE SODIUM 100 MG/1
100 CAPSULE, LIQUID FILLED ORAL DAILY
COMMUNITY

## 2021-11-15 RX ADMIN — SERTRALINE 25 MG: 25 TABLET, FILM COATED ORAL at 13:08

## 2021-11-15 RX ADMIN — DILTIAZEM HYDROCHLORIDE 180 MG: 180 CAPSULE, COATED, EXTENDED RELEASE ORAL at 17:49

## 2021-11-15 RX ADMIN — Medication 10 ML: at 22:35

## 2021-11-15 RX ADMIN — ONDANSETRON 4 MG: 2 INJECTION INTRAMUSCULAR; INTRAVENOUS at 06:02

## 2021-11-15 RX ADMIN — HEPARIN SODIUM 5000 UNITS: 5000 INJECTION INTRAVENOUS; SUBCUTANEOUS at 13:08

## 2021-11-15 RX ADMIN — HEPARIN SODIUM 5000 UNITS: 5000 INJECTION INTRAVENOUS; SUBCUTANEOUS at 06:02

## 2021-11-15 RX ADMIN — HEPARIN SODIUM 5000 UNITS: 5000 INJECTION INTRAVENOUS; SUBCUTANEOUS at 22:34

## 2021-11-15 RX ADMIN — ATORVASTATIN CALCIUM 40 MG: 40 TABLET, FILM COATED ORAL at 13:07

## 2021-11-15 ASSESSMENT — PAIN SCALES - WONG BAKER
WONGBAKER_NUMERICALRESPONSE: 0
WONGBAKER_NUMERICALRESPONSE: 0

## 2021-11-15 ASSESSMENT — PAIN SCALES - GENERAL
PAINLEVEL_OUTOF10: 0

## 2021-11-15 NOTE — PROGRESS NOTES
Pharmacy Medication Reconciliation Note     List of medications patient is currently taking is complete. Source of information:   1. Conversation with pt at bedside   2. Pt not able to tell me all of his meds; asked that I call [facility] to verify his meds. No Known Allergies    Notes regarding home medications:   1. Confirmed most of the list. Added Clonidine 0.2mg/24 hr patch, Diltiazem, KCL, Iron.      Lindsey Montes [facility]  11/15/2021  1:07 PM

## 2021-11-15 NOTE — PROGRESS NOTES
INPATIENT PROGRESS NOTE        IDENTIFYING DATA/REASON FOR CONSULTATION   PATIENT:  Mari Lutz  MRN:  [id number]  ADMIT DATE: 2021  TIME OF EVALUATION: 11/15/2021 10:45 AM  HOSPITAL STAY:   LOS: 2 days   CONSULTING PHYSICIAN: Lydia Mireles MD   REASON FOR CONSULTATION: sigmoid volvulus     Subjective:    Patient seen in follow up. According to RN patient had a large liquid bowel movement overnight. Patient denies abdominal pain. He had nausea this morning which improved with Zofran. MEDICATIONS   SCHEDULED:  atorvastatin, 40 mg, Daily  sertraline, 25 mg, Daily  sodium chloride flush, 5-40 mL, 2 times per day  heparin (porcine), 5,000 Units, 3 times per day  insulin lispro, 0-6 Units, Q4H      FLUIDS/DRIPS:     sodium chloride      dextrose       PRNs: potassium chloride, 10 mEq, PRN  sodium chloride flush, 5-40 mL, PRN  sodium chloride, 25 mL, PRN  ondansetron, 4 mg, Q8H PRN   Or  ondansetron, 4 mg, Q6H PRN  polyethylene glycol, 17 g, Daily PRN  acetaminophen, 650 mg, Q6H PRN   Or  acetaminophen, 650 mg, Q6H PRN  glucose, 15 g, PRN  dextrose, 12.5 g, PRN  glucagon (rDNA), 1 mg, PRN  dextrose, 100 mL/hr, PRN      ALLERGIES:  No Known Allergies      PHYSICAL EXAM   VITALS:  BP (!) 180/98   Pulse 51   Temp 97.4 °F (36.3 °C) (Oral)   Resp 16   Wt 197 lb 12 oz (89.7 kg)   SpO2 96%   TEMPERATURE:  Current - Temp: 97.4 °F (36.3 °C); Max - Temp  Av.5 °F (36.4 °C)  Min: 97.4 °F (36.3 °C)  Max: 97.6 °F (36.4 °C)    Physical Exam:  General appearance: alert, cooperative, no distress, appears stated age  Eyes: Anicteric  Head: Normocephalic, without obvious abnormality  Lungs: clear to auscultation bilaterally, Normal Effort  Heart: regular rate and rhythm, normal S1 and S2, no murmurs or rubs  Abdomen: soft, non-distended, non-tender.  Bowel sounds normal.   Extremities: atraumatic, no cyanosis or edema  Skin: warm and dry, no jaundice  Neuro: Grossly intact, A&OX3    LABS AND IMAGING Laboratory   Recent Labs     11/13/21  0752 11/14/21  0725 11/15/21  0725   WBC 10.0 7.1 5.9   HGB 10.6* 9.2* 7.5*   HCT 32.4* 28.3* 23.1*   MCV 89.2 89.2 89.3    175 154     Recent Labs     11/13/21  0752 11/14/21  0725 11/15/21  0725    139 136   K 3.3* 2.9* 3.3*    111* 107   CO2 17* 15* 12*   BUN 27* 29* 18   CREATININE 1.9* 2.0* 1.1     Recent Labs     11/13/21 0752   AST 10*   ALT 6*   BILITOT 0.5   ALKPHOS 142*     No results for input(s): LIPASE, AMYLASE in the last 72 hours. No results for input(s): PROTIME, INR in the last 72 hours. Imaging  CT CHEST ABDOMEN PELVIS W CONTRAST   Final Result   1. No acute cardiopulmonary process. Opacity seen on the chest x-ray for   caused by linear scarring in the right middle lobe and lingula   2. Sigmoid volvulus   3. Left adrenal nodule, likely adenoma in the absence of known malignancy         XR CHEST PORTABLE   Final Result   Opacities in the lower lung zones suspicious for viral pattern of pneumonia. No definite findings of CHF             Endoscopy  Colonoscopy to ascending colon 11/13/2021 with Dr. Hemalatha Gonzalez  The mucosa was gross ly normal and viable. The was a volvulus in the sigmoid colon which was seen to revolve on its linear axis and straighten. The scope was then removed. ASSESSMENT AND RECOMMENDATIONS   Lisseth Coffman is a 64 y.o. male with PMH of CVA, DM, HTN who presented on 11/13/2021 with abdominal pain. CTA/P showed sigmoid volvulus. Underwent colonoscopy with resolution of sigmoid volvulus. 1. Sigmoid volvulus s/p colonoscopy  -Patient had liquid bowel movement overnight. Patient denies abdominal pain  -GEN surgery following. No plan for urgent surgical intervention at this time      RECOMMENDATIONS:    Okay from GI standpoint to advance diet as tolerated if okay with general surgery. We will continue to monitor stool output.     If you have any questions or need any further information, please feel free to contact us [phone]. Thank you for allowing us to participate in the care of Juan Kidd. The note was completed using Dragon voice recognition transcription. Every effort was made to ensure accuracy; however, inadvertent transcription errors may be present despite my best efforts to edit errors. Josephine MART    Attending physician addendum:      I have personally seen and examined the patient, reviewed the patient's medical record and pertinent labs and clinical imaging. I have personally staffed the case with BARRON Carvalho. I agree with her consultation note, exam findings, assessment and plans  as written above. I have made appropriate modifications and edited her assessment and plan where needed to reflect my impression and plans for this patient. Patient with liquid BM's and less distension. BP (!) 180/98   Pulse 51   Temp 97.4 °F (36.3 °C) (Oral)   Resp 16   Ht 5' 9\" (1.753 m)   Wt 197 lb 12 oz (89.7 kg)   SpO2 96%   BMI 29.20 kg/m²      Gen- NAD  CV- bradycardic  Lungs- CTAB  Abd- soft, NT/ND  Ext- no C/C/E/E    Impression:    1) Sigmoid volvulus- s/p colonoscopic decompression. Clinically improved. Surgery is deferring surgical interventions. Plan:  1) Advance diet as tolerated  2) If symptoms recur, patient needs surgical intervention  3) Will follow peripherally. Call with ? Thank you for allowing me to participate in this patient's care. If there are any questions or concerns regarding this patient, or the plan we have set in place, please feel free to contact me at [phone].      Kaylin Bryant,

## 2021-11-15 NOTE — PROGRESS NOTES
Hospitalist Progress Note      PCP: OBI Whitfield - [facility]    Date of Admission: 11/13/2021    Chief Complaint: abdominal pain     Hospital Course:    64 y.o. male who presents to [facility] with abdominal pain.      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 Presented with worsening abdominal pain for the past 3 days, patient describes colicky severe pain reaching 9/10 in severity, worse in the mid abdomen, associated with diarrhea and had been progressively worse over the last 3 days, no relieving factors, no blood noted in the stool, no nausea or vomiting, patient has no previous attacks of similar symptoms.     Admitted for sigmoid volvulus, S/P colonoscopy 11/13. Subjective:   Patient denies any pain, passing BM. Reported one episode of vomiting this morning, continues to be on clear liquid diet. Medications:  Reviewed    Infusion Medications    sodium chloride      dextrose       Scheduled Medications    sodium chloride flush  5-40 mL IntraVENous 2 times per day    heparin (porcine)  5,000 Units SubCUTAneous 3 times per day    insulin lispro  0-6 Units SubCUTAneous Q4H     PRN Meds: potassium chloride, sodium chloride flush, sodium chloride, ondansetron **OR** ondansetron, polyethylene glycol, acetaminophen **OR** acetaminophen, glucose, dextrose, glucagon (rDNA), dextrose      Intake/Output Summary (Last 24 hours) at 11/15/2021 1025  Last data filed at 11/15/2021 0600  Gross per 24 hour   Intake 3746.37 ml   Output 850 ml   Net 2896.37 ml       Physical Exam Performed:    BP (!) 180/98   Pulse 51   Temp 97.4 °F (36.3 °C) (Oral)   Resp 16   Wt 197 lb 12 oz (89.7 kg)   SpO2 96%     General appearance: Seems more comfortable. HEENT Normal cephalic, atraumatic without obvious deformity. Pupils equal, round, and reactive to light. Extra ocular muscles intact. Conjunctivae/corneas clear. Neck: Supple, No jugular venous distention/bruits. Trachea midline without thyromegaly or adenopathy with full range of motion. Lungs: Clear to auscultation, bilaterally without Rales/Wheezes/Rhonchi with good respiratory effort. Heart: Regular rate and rhythm with Normal S1/S2 without murmurs, rubs or gallops, point of maximum impulse non-displaced  Abdomen: no tenderness this morning. Extremities: Contractures noted in the right upper limb with muscle atrophy in the right lower limb. Skin: Skin color, texture, turgor normal.  No rashes or lesions. Neurologic: baseline right sided weakness. Mental status: Alert, oriented, thought content appropriate. Capillary Refill: Acceptable  < 3 seconds  Peripheral Pulses: +3 Easily felt, not easily obliterated with pressure         Labs:   Recent Labs     11/13/21  0752 11/14/21  0725 11/15/21  0725   WBC 10.0 7.1 5.9   HGB 10.6* 9.2* 7.5*   HCT 32.4* 28.3* 23.1*    175 154     Recent Labs     11/13/21 0752 11/14/21  0725 11/15/21  0725    139 136   K 3.3* 2.9* 3.3*    111* 107   CO2 17* 15* 12*   BUN 27* 29* 18   CREATININE 1.9* 2.0* 1.1   CALCIUM 9.6 8.4 7.5*     Recent Labs     11/13/21 0752   AST 10*   ALT 6*   BILITOT 0.5   ALKPHOS 142*     No results for input(s): INR in the last 72 hours. Recent Labs     11/13/21 0752   TROPONINI <0.01       Urinalysis:      Lab Results   Component Value Date    NITRU Negative 11/13/2021    WBCUA 20 11/13/2021    RBCUA 1 11/13/2021    BLOODU SMALL 11/13/2021    SPECGRAV 1.025 11/13/2021    GLUCOSEU Negative 11/13/2021       Radiology:  CT CHEST ABDOMEN PELVIS W CONTRAST   Final Result   1. No acute cardiopulmonary process. Opacity seen on the chest x-ray for   caused by linear scarring in the right middle lobe and lingula   2. Sigmoid volvulus   3.  Left adrenal nodule, likely adenoma in the absence of known malignancy         XR CHEST PORTABLE   Final Result   Opacities in the lower lung zones suspicious for viral pattern of pneumonia. No definite findings of CHF                 Assessment/Plan:    Active Hospital Problems    Diagnosis     Sigmoid volvulus ([facility] [address] [K56.2]    Abdominal pain   Sigmoid volvulus. Patient presented to emergency with progressively worsening abdominal pain, patient reported diarrhea however no nausea and vomiting. Upon presentation patient was found to be vitally stable. Exam with abdominal tenderness. CT scan showed sigmoid volvulus, general surgery and gastroenterology involved. Underwent colonoscopic treatment of Benuel Lennox successfully 11/13. Passing BM, however did report nausea and vomiting this morning. Plan. -Appreciate gastroenterology input.  -Appreciate general surgery input. ( patient may still need surgery non-emergently). -DC IV fluids. - advance diet is ok with GI and surgery.      AF. On Eliquis, will hold for possible surgery. Patient has not taken any of his medications for the past 3 days.      DM. Hold home glipizide. Insulin sliding scale.     Gout. Hold allopurinol.     Hyperlipidemia. Resume atorvastatin.     Depression. Resume sertraline.      History of CVA. residual right sided weakness, wheel chair bound.          DVT Prophylaxis: heparin  Diet: ADULT DIET; Clear Liquid  Code Status: Full Code    PT/OT Eval Status: wheelchair bound at baseline.      Dispo - pending clinical improvement      Dalila Flores MD

## 2021-11-15 NOTE — PLAN OF CARE
Nutrition Problem #1: Inadequate oral intake  Intervention: Food and/or Nutrient Delivery: Continue Current Diet (Advance diet as appropriate per MD)  Nutritional Goals:  Tolerate diet with >50% meal intake

## 2021-11-15 NOTE — PROGRESS NOTES
General and Vascular Surgery                                                           Daily Progress Note                                                             Rochelle Alonso PA-C     Pt Name: Sheila Casas  Medical Record Number: [id number]  Date of Birth 1945   Today's Date: 11/15/2021    ASSESSMENT/PLAN  1. Sigmoid volvulus  2. Endoscopy performed 11/13 and resolved volvulus   3. Denies having any pain this AM  4. Non distended  5. +flatulence and BM's  6. No urgent surgical intervention needed at his time. EDUCATION  Patient educated about their illness/diagnosis, stated above, and all questions answered. We discussed the importance of nutrition, medications they are taking, and healthy lifestyle. Sebastián Crawford has improved from yesterday. Pain is well controlled. He has no nausea and no vomiting. He has passed flatus and has had a bowel movement. OBJECTIVE  VITALS:  weight is 197 lb 12 oz (89.7 kg). His oral temperature is 97.4 °F (36.3 °C). His blood pressure is 180/98 (abnormal) and his pulse is 51. His respiration is 16 and oxygen saturation is 96%. VITALS:  BP (!) 180/98   Pulse 51   Temp 97.4 °F (36.3 °C) (Oral)   Resp 16   Wt 197 lb 12 oz (89.7 kg)   SpO2 96%   GENERAL: alert, no distress  ABDOMEN: soft, non-tender and non-distended  I/O last 3 completed shifts: In: 3746.4 [P.O.:480; I.V.:2659.5; IV Piggyback:606.8]  Out: 850 [Urine:850]  No intake/output data recorded.     LABS  Recent Labs     11/13/21  0752 11/13/21  1635 11/14/21  0725 11/15/21  0725   WBC 10.0  --    < > 5.9   HGB 10.6*  --    < > 7.5*   HCT 32.4*  --    < > 23.1*     --    < > 154     --    < > 136   K 3.3*  --    < > 3.3*     --    < > 107   CO2 17*  --    < > 12*   BUN 27*  --    < > 18   CREATININE 1.9*  --    < > 1.1   MG 1.90  --   --   --    CALCIUM 9.6  --    < > 7.5*   AST 10*  --   --   --    ALT 6*  --   --   --

## 2021-11-15 NOTE — PROGRESS NOTES
Comprehensive Nutrition Assessment    Type and Reason for Visit:  Initial, Positive Nutrition Screen    Nutrition Recommendations/Plan:   - Continue clear liquid diet and advance as appropriate per MD  - Monitor for tolerance to diet advancement  - Monitor intakes for possible need of ONS    Nutrition Assessment:  Positive nutrition screen. Hx diabetes. Admitted with abdominal pain and diarrhea. Pt with sigmoid volvulus. Pt was eating normally prior to admission. No wt hx available. Pt is currently on clear liquid diet. +emesis this AM. Will monitor for diet advancement and possible need of ONS. Malnutrition Assessment:  Malnutrition Status: At risk for malnutrition (Comment)    Context:  Acute Illness       Estimated Daily Nutrient Needs:  Energy (kcal):  2893-9426 (20-25kcal/90kg); Weight Used for Energy Requirements:  Current     Protein (g):   (1.2-1.4g/73kg); Weight Used for Protein Requirements:  Ideal        Fluid (ml/day):  1 ml/kcal      Nutrition Related Findings:  Wheelchair bound. +1 pitting BLE edema. Wounds:  None       Current Nutrition Therapies:    ADULT DIET; Clear Liquid    Anthropometric Measures:  · Height: 5' 9\" (175.3 cm)  · Current Body Weight: 197 lb (89.4 kg)   · Admission Body Weight: 206 lb (93.4 kg)     · Ideal Body Weight: 160 lbs; % Ideal Body Weight 123.1 %   · BMI: 29.1  · BMI Categories: Overweight (BMI 25.0-29. 9)       Nutrition Diagnosis:   · Inadequate oral intake related to altered GI structure as evidenced by NPO or clear liquid status due to medical condition    Nutrition Interventions:   Food and/or Nutrient Delivery:  Continue Current Diet (Advance diet as appropriate per MD)  Nutrition Education/Counseling:  Education not indicated   Coordination of Nutrition Care:  Continue to monitor while inpatient    Goals:   Tolerate diet with >50% meal intake       Nutrition Monitoring and Evaluation:   Behavioral-Environmental Outcomes:  None Identified Food/Nutrient Intake Outcomes:  Diet Advancement/Tolerance, Food and Nutrient Intake  Physical Signs/Symptoms Outcomes:  Biochemical Data, GI Status, Nausea or Vomiting, Weight     Discharge Planning:     Too soon to determine     Electronically signed by Jann Sales RD, LD on 11/15/21 at 1:33 PM EST    Contact: [phone]

## 2021-11-15 NOTE — PLAN OF CARE
Problem: Falls - Risk of:  Goal: Will remain free from falls  Description: Will remain free from falls  Outcome: Ongoing  Goal: Absence of physical injury  Description: Absence of physical injury  Outcome: Ongoing  Fall risk assessed. Precautions in place. Bed low and locked with side rails up x2-3. Nonskid socks on when OOB. Bed/chair alarm utilized. Call light within reach. Frequent checks performed. No falls at this time. Problem: Skin Integrity:  Goal: Will show no infection signs and symptoms  Description: Will show no infection signs and symptoms  Outcome: Ongoing  Goal: Absence of new skin breakdown  Description: Absence of new skin breakdown  Outcome: Ongoing  Skin assessed per protocol. Quan score obtained. Skin kept clean, dry and warm. Encouraged pt to reposition to reduce the risk of PUs. Pillow support in place. Will continue to monitor.       Problem: Infection:  Goal: Will remain free from infection  Description: Will remain free from infection  Outcome: Ongoing     Problem: Safety:  Goal: Free from accidental physical injury  Description: Free from accidental physical injury  Outcome: Ongoing  Goal: Free from intentional harm  Description: Free from intentional harm  Outcome: Ongoing     Problem: Daily Care:  Goal: Daily care needs are met  Description: Daily care needs are met  Outcome: Ongoing     Problem: Pain:  Goal: Patient's pain/discomfort is manageable  Description: Patient's pain/discomfort is manageable  Outcome: Ongoing     Problem: Skin Integrity:  Goal: Skin integrity will stabilize  Description: Skin integrity will stabilize  Outcome: Ongoing     Problem: Discharge Planning:  Goal: Patients continuum of care needs are met  Description: Patients continuum of care needs are met  Outcome: Ongoing

## 2021-11-15 NOTE — PLAN OF CARE
Problem: Falls - Risk of:  Goal: Will remain free from falls  Description: Will remain free from falls  11/15/2021 1041 by Juana Broussard RN  Outcome: Ongoing  11/14/2021 2303 by Michela Medellin RN  Outcome: Ongoing  Goal: Absence of physical injury  Description: Absence of physical injury  11/15/2021 1041 by Juana Broussard RN  Outcome: Ongoing  11/14/2021 2303 by Michela Medellin RN  Outcome: Ongoing     Problem: Skin Integrity:  Goal: Will show no infection signs and symptoms  Description: Will show no infection signs and symptoms  11/15/2021 1041 by Juana Broussard RN  Outcome: Ongoing  11/14/2021 2303 by Michela Medellin RN  Outcome: Ongoing  Goal: Absence of new skin breakdown  Description: Absence of new skin breakdown  11/15/2021 1041 by Juana Broussard RN  Outcome: Ongoing  11/14/2021 2303 by Michela Medellin RN  Outcome: Ongoing     Problem: Infection:  Goal: Will remain free from infection  Description: Will remain free from infection  11/15/2021 1041 by Juana Broussard RN  Outcome: Ongoing  11/14/2021 2303 by Michela Medellin RN  Outcome: Ongoing     Problem: Safety:  Goal: Free from accidental physical injury  Description: Free from accidental physical injury  11/15/2021 1041 by Juana Broussard RN  Outcome: Ongoing  11/14/2021 2303 by Michela Medellin RN  Outcome: Ongoing  Goal: Free from intentional harm  Description: Free from intentional harm  11/15/2021 1041 by Juana Broussard RN  Outcome: Ongoing  11/14/2021 2303 by Michela Medellin RN  Outcome: Ongoing     Problem: Daily Care:  Goal: Daily care needs are met  Description: Daily care needs are met  11/15/2021 1041 by Juana Broussard RN  Outcome: Ongoing  11/14/2021 2303 by Michela Medellin RN  Outcome: Ongoing     Problem: Pain:  Goal: Patient's pain/discomfort is manageable  Description: Patient's pain/discomfort is manageable  11/15/2021 1041 by Juana Broussard RN  Outcome: Ongoing  11/14/2021 2303 by Michela Medellin RN  Outcome: Ongoing     Problem: Skin Integrity:  Goal: Skin integrity will stabilize  Description: Skin integrity will stabilize  11/15/2021 1041 by Yumi Padilla RN  Outcome: Ongoing  11/14/2021 2303 by Nathanael Nicholas RN  Outcome: Ongoing     Problem: Discharge Planning:  Goal: Patients continuum of care needs are met  Description: Patients continuum of care needs are met  11/15/2021 1041 by Yumi Padilla RN  Outcome: Ongoing  11/14/2021 2303 by Nathanael Nicholas RN  Outcome: Ongoing

## 2021-11-16 LAB
ANION GAP SERPL CALCULATED.3IONS-SCNC: 11 MMOL/L (ref 3–16)
BASOPHILS ABSOLUTE: 0.1 K/UL (ref 0–0.2)
BASOPHILS RELATIVE PERCENT: 0.9 %
BUN BLDV-MCNC: 22 MG/DL (ref 7–20)
CALCIUM SERPL-MCNC: 8.3 MG/DL (ref 8.3–10.6)
CHLORIDE BLD-SCNC: 111 MMOL/L (ref 99–110)
CO2: 18 MMOL/L (ref 21–32)
CREAT SERPL-MCNC: 1.8 MG/DL (ref 0.8–1.3)
EOSINOPHILS ABSOLUTE: 0.2 K/UL (ref 0–0.6)
EOSINOPHILS RELATIVE PERCENT: 3.1 %
GFR AFRICAN AMERICAN: 44
GFR NON-AFRICAN AMERICAN: 37
GLUCOSE BLD-MCNC: 118 MG/DL (ref 70–99)
GLUCOSE BLD-MCNC: 119 MG/DL (ref 70–99)
GLUCOSE BLD-MCNC: 184 MG/DL (ref 70–99)
GLUCOSE BLD-MCNC: 189 MG/DL (ref 70–99)
GLUCOSE BLD-MCNC: 80 MG/DL (ref 70–99)
GLUCOSE BLD-MCNC: 90 MG/DL (ref 70–99)
GLUCOSE BLD-MCNC: 93 MG/DL (ref 70–99)
HCT VFR BLD CALC: 27.1 % (ref 40.5–52.5)
HEMOGLOBIN: 8.9 G/DL (ref 13.5–17.5)
LYMPHOCYTES ABSOLUTE: 1.5 K/UL (ref 1–5.1)
LYMPHOCYTES RELATIVE PERCENT: 23.5 %
MCH RBC QN AUTO: 29.1 PG (ref 26–34)
MCHC RBC AUTO-ENTMCNC: 33 G/DL (ref 31–36)
MCV RBC AUTO: 88.1 FL (ref 80–100)
MONOCYTES ABSOLUTE: 0.5 K/UL (ref 0–1.3)
MONOCYTES RELATIVE PERCENT: 8.2 %
NEUTROPHILS ABSOLUTE: 4 K/UL (ref 1.7–7.7)
NEUTROPHILS RELATIVE PERCENT: 64.3 %
PDW BLD-RTO: 14.9 % (ref 12.4–15.4)
PERFORMED ON: ABNORMAL
PERFORMED ON: NORMAL
PERFORMED ON: NORMAL
PLATELET # BLD: 170 K/UL (ref 135–450)
PMV BLD AUTO: 9.4 FL (ref 5–10.5)
POTASSIUM SERPL-SCNC: 2.7 MMOL/L (ref 3.5–5.1)
POTASSIUM SERPL-SCNC: 5.9 MMOL/L (ref 3.5–5.1)
RBC # BLD: 3.07 M/UL (ref 4.2–5.9)
SODIUM BLD-SCNC: 140 MMOL/L (ref 136–145)
WBC # BLD: 6.2 K/UL (ref 4–11)

## 2021-11-16 PROCEDURE — 6370000000 HC RX 637 (ALT 250 FOR IP): Performed by: STUDENT IN AN ORGANIZED HEALTH CARE EDUCATION/TRAINING PROGRAM

## 2021-11-16 PROCEDURE — 6360000002 HC RX W HCPCS: Performed by: STUDENT IN AN ORGANIZED HEALTH CARE EDUCATION/TRAINING PROGRAM

## 2021-11-16 PROCEDURE — APPSS45 APP SPLIT SHARED TIME 31-45 MINUTES: Performed by: PHYSICIAN ASSISTANT

## 2021-11-16 PROCEDURE — 80048 BASIC METABOLIC PNL TOTAL CA: CPT

## 2021-11-16 PROCEDURE — 84132 ASSAY OF SERUM POTASSIUM: CPT

## 2021-11-16 PROCEDURE — 36415 COLL VENOUS BLD VENIPUNCTURE: CPT

## 2021-11-16 PROCEDURE — APPNB45 APP NON BILLABLE 31-45 MINUTES: Performed by: PHYSICIAN ASSISTANT

## 2021-11-16 PROCEDURE — 6370000000 HC RX 637 (ALT 250 FOR IP): Performed by: PHYSICIAN ASSISTANT

## 2021-11-16 PROCEDURE — 94760 N-INVAS EAR/PLS OXIMETRY 1: CPT

## 2021-11-16 PROCEDURE — 6370000000 HC RX 637 (ALT 250 FOR IP): Performed by: NURSE PRACTITIONER

## 2021-11-16 PROCEDURE — 85025 COMPLETE CBC W/AUTO DIFF WBC: CPT

## 2021-11-16 PROCEDURE — APPSS45 APP SPLIT SHARED TIME 31-45 MINUTES: Performed by: NURSE PRACTITIONER

## 2021-11-16 PROCEDURE — APPNB45 APP NON BILLABLE 31-45 MINUTES: Performed by: NURSE PRACTITIONER

## 2021-11-16 PROCEDURE — 2580000003 HC RX 258: Performed by: STUDENT IN AN ORGANIZED HEALTH CARE EDUCATION/TRAINING PROGRAM

## 2021-11-16 PROCEDURE — 1200000000 HC SEMI PRIVATE

## 2021-11-16 RX ORDER — POLYETHYLENE GLYCOL 3350 17 G/17G
17 POWDER, FOR SOLUTION ORAL DAILY
Status: DISCONTINUED | OUTPATIENT
Start: 2021-11-16 | End: 2021-11-17 | Stop reason: HOSPADM

## 2021-11-16 RX ORDER — ALLOPURINOL 100 MG/1
100 TABLET ORAL DAILY
Status: DISCONTINUED | OUTPATIENT
Start: 2021-11-16 | End: 2021-11-17 | Stop reason: HOSPADM

## 2021-11-16 RX ORDER — PANTOPRAZOLE SODIUM 40 MG/1
40 TABLET, DELAYED RELEASE ORAL
Status: DISCONTINUED | OUTPATIENT
Start: 2021-11-17 | End: 2021-11-17 | Stop reason: HOSPADM

## 2021-11-16 RX ORDER — FERROUS SULFATE TAB EC 324 MG (65 MG FE EQUIVALENT) 324 (65 FE) MG
324 TABLET DELAYED RESPONSE ORAL 2 TIMES DAILY WITH MEALS
Status: DISCONTINUED | OUTPATIENT
Start: 2021-11-16 | End: 2021-11-17 | Stop reason: HOSPADM

## 2021-11-16 RX ORDER — POTASSIUM CHLORIDE 20 MEQ/1
40 TABLET, EXTENDED RELEASE ORAL 2 TIMES DAILY
Status: DISCONTINUED | OUTPATIENT
Start: 2021-11-16 | End: 2021-11-17 | Stop reason: HOSPADM

## 2021-11-16 RX ORDER — HYDRALAZINE HYDROCHLORIDE 20 MG/ML
5 INJECTION INTRAMUSCULAR; INTRAVENOUS EVERY 6 HOURS PRN
Status: DISCONTINUED | OUTPATIENT
Start: 2021-11-16 | End: 2021-11-17 | Stop reason: HOSPADM

## 2021-11-16 RX ADMIN — Medication 10 ML: at 08:13

## 2021-11-16 RX ADMIN — INSULIN LISPRO 1 UNITS: 100 INJECTION, SOLUTION INTRAVENOUS; SUBCUTANEOUS at 22:31

## 2021-11-16 RX ADMIN — Medication 10 MEQ: at 11:43

## 2021-11-16 RX ADMIN — ATORVASTATIN CALCIUM 40 MG: 40 TABLET, FILM COATED ORAL at 08:13

## 2021-11-16 RX ADMIN — POLYETHYLENE GLYCOL 3350 17 G: 17 POWDER, FOR SOLUTION ORAL at 11:22

## 2021-11-16 RX ADMIN — SERTRALINE 25 MG: 25 TABLET, FILM COATED ORAL at 08:13

## 2021-11-16 RX ADMIN — Medication 10 MEQ: at 10:08

## 2021-11-16 RX ADMIN — INSULIN LISPRO 1 UNITS: 100 INJECTION, SOLUTION INTRAVENOUS; SUBCUTANEOUS at 16:37

## 2021-11-16 RX ADMIN — Medication 10 MEQ: at 16:32

## 2021-11-16 RX ADMIN — APIXABAN 5 MG: 5 TABLET, FILM COATED ORAL at 14:38

## 2021-11-16 RX ADMIN — SODIUM CHLORIDE 25 ML: 9 INJECTION, SOLUTION INTRAVENOUS at 08:35

## 2021-11-16 RX ADMIN — HYDRALAZINE HYDROCHLORIDE 5 MG: 20 INJECTION INTRAMUSCULAR; INTRAVENOUS at 05:33

## 2021-11-16 RX ADMIN — ALLOPURINOL 100 MG: 100 TABLET ORAL at 14:38

## 2021-11-16 RX ADMIN — Medication 10 MEQ: at 13:25

## 2021-11-16 RX ADMIN — POTASSIUM CHLORIDE 40 MEQ: 20 TABLET, EXTENDED RELEASE ORAL at 14:38

## 2021-11-16 RX ADMIN — HEPARIN SODIUM 5000 UNITS: 5000 INJECTION INTRAVENOUS; SUBCUTANEOUS at 21:26

## 2021-11-16 RX ADMIN — HEPARIN SODIUM 5000 UNITS: 5000 INJECTION INTRAVENOUS; SUBCUTANEOUS at 05:33

## 2021-11-16 RX ADMIN — Medication 10 ML: at 21:27

## 2021-11-16 RX ADMIN — FERROUS SULFATE TAB EC 324 MG (65 MG FE EQUIVALENT) 324 MG: 324 (65 FE) TABLET DELAYED RESPONSE at 16:37

## 2021-11-16 RX ADMIN — Medication 10 MEQ: at 08:35

## 2021-11-16 RX ADMIN — Medication 10 MEQ: at 15:02

## 2021-11-16 RX ADMIN — DILTIAZEM HYDROCHLORIDE 180 MG: 180 CAPSULE, COATED, EXTENDED RELEASE ORAL at 08:13

## 2021-11-16 RX ADMIN — APIXABAN 5 MG: 5 TABLET, FILM COATED ORAL at 21:26

## 2021-11-16 RX ADMIN — HEPARIN SODIUM 5000 UNITS: 5000 INJECTION INTRAVENOUS; SUBCUTANEOUS at 14:38

## 2021-11-16 ASSESSMENT — PAIN SCALES - WONG BAKER
WONGBAKER_NUMERICALRESPONSE: 0

## 2021-11-16 ASSESSMENT — PAIN SCALES - GENERAL
PAINLEVEL_OUTOF10: 0

## 2021-11-16 NOTE — PROGRESS NOTES
Physician Progress Note      PATIENT:               Adriana Sierra  CSN #:                  [id number]  :                       1945  ADMIT DATE:       2021 7:25 AM  DISCH DATE:  RESPONDING  PROVIDER #:        Jorge Bustos          QUERY TEXT:    Dear Dr. Kalyan Hernandez,  Pt admitted with Volvulus. Pt noted to have elevated creat. If possible,   please document in the progress notes and discharge summary if you are   evaluating and/or treating any of the following: The medical record reflects the following:  Risk Factors: Hx CVA, DM, HTN,  Clinical Indicators:  ED for Chest pain for past couple of days/ on and   off, CT showed  Sigmoid volvulus,  BUN=27, Creat=1.9, GFR=34,  BUN=29,   Creat=2.0, GFR=32, 11/15 BUN=18, Creat=1.1, GFR=>60,  BUN=22, Creat=1.8,   GFR=37,  Treatment: IVF, Monitor labs  Thank you,  Paulie Odell RN, CDS  [email or web address]    Defined by Kidney Disease Improving Global Outcomes (KDIGO) clinical practice   guideline for acute kidney injury:?  -Increase in?SCr?by greater than or equal to 0.3 mg/dl within 48?hours;?or?  -Increase or decrease in?SCr?to greater than or equal to 1.5 times baseline,   which is known or presumed to have occurred within the prior 7?days;?or?  -Urine volume < 0.5ml/kg/h for 6 hours  Options provided:  -- Acute kidney failure  -- Acute kidney injury  -- CKD Stage 1 GFR>90  -- CKD Stage 2 GFR 60-90  -- CKD Stage 3a GFR 45-59  -- CKD Stage 3b GFR 30-44  -- Other - I will add my own diagnosis  -- Disagree - Not applicable / Not valid  -- Disagree - Clinically unable to determine / Unknown  -- Refer to Clinical Documentation Reviewer    PROVIDER RESPONSE TEXT:    This patient is in Acute kidney failure. Query created by: [id number] on 2021 12:44 PM      QUERY TEXT:    Dear Dr. Kalyan Hernandez,  Patient admitted with Volvulus, noted to have low CO2.  If possible, please   document in progress notes and discharge summary if you are evaluating and/or   treating any of the following: The medical record reflects the following:  Risk Factors: Hx CVA, DM, HTN, current Volvulus, elevated creat  Clinical Indicators: 11/13 CO2=17, 11/14 CO2=15, 11/15 CO2=12, Anion gap=17,   11/16 CO2=18  Treatment: IVF, Monitor labs  Thank you,  Bigg De Jesus RN, [email or web address]. Submittable  Options provided:  -- metabolic acidosis  -- Low CO2 not clinically significant  -- Other - I will add my own diagnosis  -- Disagree - Not applicable / Not valid  -- Disagree - Clinically unable to determine / Unknown  -- Refer to Clinical Documentation Reviewer    PROVIDER RESPONSE TEXT:    This patient has metabolic acidosis. Query created by:  Lynne Pedersen on 11/16/2021 12:53 PM      Electronically signed by:  Jose Abarca 11/16/2021 1:46 PM

## 2021-11-16 NOTE — ACP (ADVANCE CARE PLANNING)
Advance Care Planning     Advance Care Planning Activator (Inpatient)  Conversation Note      Date of ACP Conversation: 11/16/2021     Conversation Conducted with: Patient with Decision Making Capacity    ACP Activator: Margarita Grullon Decision Maker:     Current Designated Health Care Decision Maker:     Primary Decision Maker: Sukhjinder Loev - Child - [phone]    Secondary Decision Maker: Alger Lesch - Brother/Sister - [phone]      Care Preferences    Ventilation: \"If you were in your present state of health and suddenly became very ill and were unable to breathe on your own, what would your preference be about the use of a ventilator (breathing machine) if it were available to you? \"      Would the patient desire the use of ventilator (breathing machine)?: UNSURE     \"If your health worsens and it becomes clear that your chance of recovery is unlikely, what would your preference be about the use of a ventilator (breathing machine) if it were available to you? \"     Would the patient desire the use of ventilator (breathing machine)?: UNSURE     Resuscitation  \"CPR works best to restart the heart when there is a sudden event, like a heart attack, in someone who is otherwise healthy. Unfortunately, CPR does not typically restart the heart for people who have serious health conditions or who are very sick. \"    \"In the event your heart stopped as a result of an underlying serious health condition, would you want attempts to be made to restart your heart (answer \"yes\" for attempt to resuscitate) or would you prefer a natural death (answer \"no\" for do not attempt to resuscitate)? \" yes       [] Yes   [] No   Educated Patient / Samantha Fernandes regarding differences between Advance Directives and portable DNR orders.     Length of ACP Conversation in minutes:  5  Conversation Outcomes:  [x] ACP discussion completed  [] Existing advance directive reviewed with patient; no changes to patient's previously recorded wishes  [] New Advance Directive completed  [] Portable Do Not Rescitate prepared for Provider review and signature  [] POLST/POST/MOLST/MOST prepared for Provider review and signature      Follow-up plan:    [x] Schedule follow-up conversation to continue planning  [] Referred individual to Provider for additional questions/concerns   [] Advised patient/agent/surrogate to review completed ACP document and update if needed with changes in condition, patient preferences or care setting    [] This note routed to one or more involved healthcare providers      Ian Wills, [address] (MSW intern)  [facility] [place]  Phone [phone]  Fax [phone]  Electronically signed by Ian Wills on 11/16/2021 at 11:14 AM

## 2021-11-16 NOTE — PROGRESS NOTES
General and Vascular Surgery                                                           Daily Progress Note                                                             Daniel Whitt PA-C     Pt Name: Ramin Daigle  Medical Record Number: [id number]  Date of Birth 1945   Today's Date: 11/16/2021    ASSESSMENT/PLAN  1. Sigmoid volvulus  2. Endoscopy performed 11/13 and resolved volvulus   3. Denies having any pain this AM  4. Non distended  5. +flatulence and BM's  6. No urgent surgical intervention needed at his time. 7. OOB as tolerated    EDUCATION  Patient educated about their illness/diagnosis, stated above, and all questions answered. We discussed the importance of nutrition, medications they are taking, and healthy lifestyle. Slick Jacob has improved from yesterday. Pain is well controlled. He has no nausea and no vomiting. He has passed flatus and has had a bowel movement. OBJECTIVE  VITALS:  height is 5' 9\" (1.753 m) and weight is 199 lb 15.3 oz (90.7 kg). His oral temperature is 97.6 °F (36.4 °C). His blood pressure is 156/67 (abnormal) and his pulse is 52. His respiration is 16 and oxygen saturation is 97%. VITALS:  BP (!) 156/67   Pulse 52   Temp 97.6 °F (36.4 °C) (Oral)   Resp 16   Ht 5' 9\" (1.753 m)   Wt 199 lb 15.3 oz (90.7 kg)   SpO2 97%   BMI 29.53 kg/m²   GENERAL: alert, no distress  ABDOMEN: soft, non-tender and Mildly-distended  I/O last 3 completed shifts: In: 720 [P.O.:720]  Out: -   No intake/output data recorded.     LABS  Recent Labs     11/13/21  1635 11/14/21  0725 11/16/21  0713   WBC  --    < > 6.2   HGB  --    < > 8.9*   HCT  --    < > 27.1*   PLT  --    < > 170   NA  --    < > 140   K  --    < > 2.7*   CL  --    < > 111*   CO2  --    < > 18*   BUN  --    < > 22*   CREATININE  --    < > 1.8*   CALCIUM  --    < > 8.3   NITRU Negative  --   --    COLORU Yellow  --   --     < > = values in this interval not displayed.      CBC:   Lab Results   Component Value Date    WBC 6.2 11/16/2021    RBC 3.07 11/16/2021    HGB 8.9 11/16/2021    HCT 27.1 11/16/2021    MCV 88.1 11/16/2021    MCH 29.1 11/16/2021    MCHC 33.0 11/16/2021    RDW 14.9 11/16/2021     11/16/2021    MPV 9.4 11/16/2021     CMP:    Lab Results   Component Value Date     11/16/2021    K 2.7 11/16/2021    K 3.3 11/13/2021     11/16/2021    CO2 18 11/16/2021    BUN 22 11/16/2021    CREATININE 1.8 11/16/2021    GFRAA 44 11/16/2021    GFRAA >60 07/31/2010    AGRATIO 1.0 11/13/2021    LABGLOM 37 11/16/2021    GLUCOSE 80 11/16/2021    PROT 8.1 11/13/2021    PROT 6.9 07/14/2010    LABALBU 4.1 11/13/2021    CALCIUM 8.3 11/16/2021    BILITOT 0.5 11/13/2021    ALKPHOS 142 11/13/2021    AST 10 11/13/2021    ALT 6 11/13/2021         Armida Stringer PA-C  Electronically signed 11/16/2021 at 10:59 AM\    As above  No complaints today  [place] for discharge from my standpoint    Electronically signed by Reggie Beltrán MD on 11/16/2021 at 11:30 AM

## 2021-11-16 NOTE — PLAN OF CARE
Problem: Falls - Risk of:  Goal: Will remain free from falls  Description: Will remain free from falls  11/16/2021 0749 by Salvatore Bajwa RN  Outcome: Ongoing  11/16/2021 0522 by Yvonne Álvarez RN  Outcome: Ongoing  Goal: Absence of physical injury  Description: Absence of physical injury  11/16/2021 0749 by Salvatore Bajwa RN  Outcome: Ongoing  11/16/2021 0522 by Yvonne Álvarez RN  Outcome: Ongoing     Problem: Skin Integrity:  Goal: Will show no infection signs and symptoms  Description: Will show no infection signs and symptoms  11/16/2021 0749 by Salvatore Bajwa RN  Outcome: Ongoing  11/16/2021 0522 by Yvonne Álvarez RN  Outcome: Ongoing  Goal: Absence of new skin breakdown  Description: Absence of new skin breakdown  11/16/2021 0749 by Salvatore Bajwa RN  Outcome: Ongoing  11/16/2021 0522 by Yvonne Álvarez RN  Outcome: Ongoing     Problem: Infection:  Goal: Will remain free from infection  Description: Will remain free from infection  11/16/2021 0749 by Salvatore Bajwa RN  Outcome: Ongoing  11/16/2021 0522 by Yvonne Álvarez RN  Outcome: Ongoing     Problem: Safety:  Goal: Free from accidental physical injury  Description: Free from accidental physical injury  11/16/2021 0749 by Salvatore Bajwa RN  Outcome: Ongoing  11/16/2021 0522 by Yvonne Álvarez RN  Outcome: Ongoing  Goal: Free from intentional harm  Description: Free from intentional harm  11/16/2021 0749 by Salvatore Bajwa RN  Outcome: Ongoing  11/16/2021 0522 by Yvonne Álvarez RN  Outcome: Ongoing     Problem: Daily Care:  Goal: Daily care needs are met  Description: Daily care needs are met  11/16/2021 0749 by Salvatore Bajwa RN  Outcome: Ongoing  11/16/2021 0522 by Yvonne Álvarez RN  Outcome: Ongoing     Problem: Pain:  Goal: Patient's pain/discomfort is manageable  Description: Patient's pain/discomfort is manageable  11/16/2021 0749 by Salvatore Bajwa RN  Outcome: Ongoing  11/16/2021 0522 by Yvonne Álvarez RN  Outcome: Ongoing     Problem: Skin Integrity:  Goal: Skin integrity will stabilize  Description: Skin integrity will stabilize  11/16/2021 0749 by Drea Terry RN  Outcome: Ongoing  11/16/2021 0522 by Penny Rosenthal RN  Outcome: Ongoing     Problem: Discharge Planning:  Goal: Patients continuum of care needs are met  Description: Patients continuum of care needs are met  11/16/2021 0749 by Drea Terry RN  Outcome: Ongoing  11/16/2021 0522 by Penny Rosenthal RN  Outcome: Ongoing     Problem: Nutrition  Goal: Optimal nutrition therapy  11/16/2021 0749 by Drea Terry RN  Outcome: Ongoing  11/16/2021 0522 by Penny Rosenthal RN  Outcome: Ongoing

## 2021-11-16 NOTE — PROGRESS NOTES
Hospitalist Progress Note      PCP: OBI Reynaga - CNP    Date of Admission: 11/13/2021    Chief Complaint: abdominal pain     Hospital Course:    64 y.o. male who presents to [facility] with abdominal pain.      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 Presented with worsening abdominal pain for the past 3 days, patient describes colicky severe pain reaching 9/10 in severity, worse in the mid abdomen, associated with diarrhea and had been progressively worse over the last 3 days, no relieving factors, no blood noted in the stool, no nausea or vomiting, patient has no previous attacks of similar symptoms.     Admitted for sigmoid volvulus, S/P colonoscopy 11/13. Subjective:   Patient reported being nauseated. Continues to be vitally stable . Passing BM     Medications:  Reviewed    Infusion Medications    sodium chloride 25 mL (11/16/21 0105)    dextrose       Scheduled Medications    polyethylene glycol  17 g Oral Daily    atorvastatin  40 mg Oral Daily    sertraline  25 mg Oral Daily    dilTIAZem  180 mg Oral Daily    sodium chloride flush  5-40 mL IntraVENous 2 times per day    heparin (porcine)  5,000 Units SubCUTAneous 3 times per day    insulin lispro  0-6 Units SubCUTAneous Q4H     PRN Meds: hydrALAZINE, potassium chloride, sodium chloride flush, sodium chloride, ondansetron **OR** ondansetron, acetaminophen **OR** acetaminophen, glucose, dextrose, glucagon (rDNA), dextrose      Intake/Output Summary (Last 24 hours) at 11/16/2021 1347  Last data filed at 11/16/2021 1244  Gross per 24 hour   Intake 840 ml   Output --   Net 840 ml       Physical Exam Performed:    BP (!) 128/58   Pulse 50   Temp 97.5 °F (36.4 °C) (Oral)   Resp 16   Ht 5' 9\" (1.753 m)   Wt 199 lb 15.3 oz (90.7 kg)   SpO2 97%   BMI 29.53 kg/m²     General appearance: Seems more comfortable. HEENT Normal cephalic, atraumatic without obvious deformity. Pupils equal, round, and reactive to light. Extra ocular muscles intact. Conjunctivae/corneas clear. Neck: Supple, No jugular venous distention/bruits. Trachea midline without thyromegaly or adenopathy with full range of motion. Lungs: Clear to auscultation, bilaterally without Rales/Wheezes/Rhonchi with good respiratory effort. Heart: Regular rate and rhythm with Normal S1/S2 without murmurs, rubs or gallops, point of maximum impulse non-displaced  Abdomen: no tenderness this morning. Extremities: Contractures noted in the right upper limb with muscle atrophy in the right lower limb. Skin: Skin color, texture, turgor normal.  No rashes or lesions. Neurologic: baseline right sided weakness. Mental status: Alert, oriented, thought content appropriate. Capillary Refill: Acceptable  < 3 seconds  Peripheral Pulses: +3 Easily felt, not easily obliterated with pressure         Labs:   Recent Labs     11/14/21  0725 11/15/21  0725 11/16/21  0713   WBC 7.1 5.9 6.2   HGB 9.2* 7.5* 8.9*   HCT 28.3* 23.1* 27.1*    154 170     Recent Labs     11/14/21  0725 11/15/21  0725 11/16/21  0713    136 140   K 2.9* 3.3* 2.7*   * 107 111*   CO2 15* 12* 18*   BUN 29* 18 22*   CREATININE 2.0* 1.1 1.8*   CALCIUM 8.4 7.5* 8.3     No results for input(s): AST, ALT, BILIDIR, BILITOT, ALKPHOS in the last 72 hours. No results for input(s): INR in the last 72 hours. No results for input(s): Kaleen Hope in the last 72 hours. Urinalysis:      Lab Results   Component Value Date    NITRU Negative 11/13/2021    WBCUA 20 11/13/2021    RBCUA 1 11/13/2021    BLOODU SMALL 11/13/2021    SPECGRAV 1.025 11/13/2021    GLUCOSEU Negative 11/13/2021       Radiology:  CT CHEST ABDOMEN PELVIS W CONTRAST   Final Result   1. No acute cardiopulmonary process.   Opacity seen on the chest x-ray for   caused by linear scarring in the right middle lobe and lingula

## 2021-11-16 NOTE — CARE COORDINATION
SW received phone call from  Anoop Goodman from [employer]. His phone number is [phone]. He reports that this patient has personal care services from [facility] 28 hours per week 4 hours per week 7 days per week, home delivered meals from [employer] 7 meals per week and he has a life alert emergency response system from [employer]. His fax number is [phone]. He would like a copy of the discharge summary and a courtesy call. SW informed that he is waiting on GI clearance and is currently on clear liquids so he is not ready for discharge today. Respectfully submitted,    PHU Torrez  [facility]   [phone]    Electronically signed by PHU Connelly on 11/16/2021 at 3:24 PM

## 2021-11-16 NOTE — PROGRESS NOTES
Patient is alerted and oriented to person, place, time, and situation. Patient is in bed. Patient is tolerating PO fluids. No complaints of pain. Call light within reach. Able to make needs known. Fall precautions. Will monitor.

## 2021-11-16 NOTE — PROGRESS NOTES
INPATIENT PROGRESS NOTE        IDENTIFYING DATA/REASON FOR CONSULTATION   PATIENT:  Nic Pierce  MRN:  [id number]  ADMIT DATE: 2021  TIME OF EVALUATION: 2021 8:56 AM  HOSPITAL STAY:   LOS: 3 days   CONSULTING PHYSICIAN: Pearlene Lesch, MD   REASON FOR CONSULTATION: sigmoid volvulus     Subjective:    Patient seen in follow up. Pt denies abdominal pain. Abd soft. RN reports pt passed a few small smears of stool overnight     MEDICATIONS   SCHEDULED:  atorvastatin, 40 mg, Daily  sertraline, 25 mg, Daily  dilTIAZem, 180 mg, Daily  sodium chloride flush, 5-40 mL, 2 times per day  heparin (porcine), 5,000 Units, 3 times per day  insulin lispro, 0-6 Units, Q4H      FLUIDS/DRIPS:     sodium chloride 25 mL (21 0835)    dextrose       PRNs: hydrALAZINE, 5 mg, Q6H PRN  potassium chloride, 10 mEq, PRN  sodium chloride flush, 5-40 mL, PRN  sodium chloride, 25 mL, PRN  ondansetron, 4 mg, Q8H PRN   Or  ondansetron, 4 mg, Q6H PRN  polyethylene glycol, 17 g, Daily PRN  acetaminophen, 650 mg, Q6H PRN   Or  acetaminophen, 650 mg, Q6H PRN  glucose, 15 g, PRN  dextrose, 12.5 g, PRN  glucagon (rDNA), 1 mg, PRN  dextrose, 100 mL/hr, PRN      ALLERGIES:  No Known Allergies      PHYSICAL EXAM   VITALS:  BP (!) 156/67   Pulse 52   Temp 97.6 °F (36.4 °C) (Oral)   Resp 16   Ht 5' 9\" (1.753 m)   Wt 199 lb 15.3 oz (90.7 kg)   SpO2 97%   BMI 29.53 kg/m²   TEMPERATURE:  Current - Temp: 97.6 °F (36.4 °C); Max - Temp  Av.7 °F (36.5 °C)  Min: 97.4 °F (36.3 °C)  Max: 98.1 °F (36.7 °C)    Physical Exam:  General appearance: alert, cooperative, no distress, appears stated age  Eyes: Anicteric  Head: Normocephalic, without obvious abnormality  Lungs: clear to auscultation bilaterally, Normal Effort  Heart: regular rate and rhythm, normal S1 and S2, no murmurs or rubs  Abdomen: soft, non-distended, non-tender.  Bowel sounds normal.   Extremities: atraumatic, no cyanosis or edema  Skin: warm and dry, no jaundice  Neuro: Grossly intact, A&OX3    LABS AND IMAGING   Laboratory   Recent Labs     11/14/21  0725 11/15/21  0725 11/16/21  0713   WBC 7.1 5.9 6.2   HGB 9.2* 7.5* 8.9*   HCT 28.3* 23.1* 27.1*   MCV 89.2 89.3 88.1    154 170     Recent Labs     11/14/21  0725 11/15/21  0725 11/16/21  0713    136 140   K 2.9* 3.3* 2.7*   * 107 111*   CO2 15* 12* 18*   BUN 29* 18 22*   CREATININE 2.0* 1.1 1.8*     No results for input(s): AST, ALT, ALB, BILIDIR, BILITOT, ALKPHOS in the last 72 hours. No results for input(s): LIPASE, AMYLASE in the last 72 hours. No results for input(s): PROTIME, INR in the last 72 hours. Imaging  CT CHEST ABDOMEN PELVIS W CONTRAST   Final Result   1. No acute cardiopulmonary process. Opacity seen on the chest x-ray for   caused by linear scarring in the right middle lobe and lingula   2. Sigmoid volvulus   3. Left adrenal nodule, likely adenoma in the absence of known malignancy         XR CHEST PORTABLE   Final Result   Opacities in the lower lung zones suspicious for viral pattern of pneumonia. No definite findings of CHF             Endoscopy  Colonoscopy to ascending colon 11/13/2021 with Dr. Lul Cyr  The mucosa was gross ly normal and viable. The was a volvulus in the sigmoid colon which was seen to revolve on its linear axis and straighten. The scope was then removed. ASSESSMENT AND RECOMMENDATIONS   Sabine Noriega is a 64 y.o. male with PMH of CVA, DM, HTN who presented on 11/13/2021 with abdominal pain. CTA/P showed sigmoid volvulus. Underwent colonoscopy with resolution of sigmoid volvulus. 1. Sigmoid volvulus s/p colonoscopy  -resolved. Having BMs,  abd soft. -GEN surgery following.   No plan for urgent surgical intervention at this time      RECOMMENDATIONS:    Diet as tolerated  Start Miralax daily to promote soft easy passable stools  Ok from GI standpoint for discharg    If you have any questions or need any further information, please feel free to contact us [phone]. Thank you for allowing us to participate in the care of Juan Kidd. The note was completed using Dragon voice recognition transcription. Every effort was made to ensure accuracy; however, inadvertent transcription errors may be present despite my best efforts to edit errors. Kelli MART    Attending physician addendum:      I have personally seen and examined the patient, reviewed the patient's medical record and pertinent labs and clinical imaging. I have personally staffed the case with BARRON Berkowitz. I agree with her consultation note, exam findings, assessment and plans  as written above. I have made appropriate modifications and edited her assessment and plan where needed to reflect my impression and plans for this patient. Nursing reports some smears of stool. Patient denying pain or distension. No vomiting. Tolerating diet    BP (!) 156/67   Pulse 52   Temp 97.6 °F (36.4 °C) (Oral)   Resp 16   Ht 5' 9\" (1.753 m)   Wt 199 lb 15.3 oz (90.7 kg)   SpO2 97%   BMI 29.53 kg/m²      Gen- NAD  CV- bradycardic  Lungs- CTAB  Abd- soft, NT/ND  Ext- no C/C/E/E     Impression:     1) Sigmoid volvulus- s/p colonoscopic decompression. Clinically improved. Surgery is deferring surgical interventions.      Plan:  1) Advance diet as tolerated  2) If symptoms recur, patient will need surgical intervention. No role for repeat colonoscopy at this point  3) Will sign off. Call with ? Thank you for allowing me to participate in this patient's care. If there are any questions or concerns regarding this patient, or the plan we have set in place, please feel free to contact me at [phone].      Xiang Santana,

## 2021-11-16 NOTE — PLAN OF CARE
Problem: Falls - Risk of:  Goal: Will remain free from falls  Description: Will remain free from falls  Outcome: Ongoing  Goal: Absence of physical injury  Description: Absence of physical injury  Outcome: Ongoing     Problem: Skin Integrity:  Goal: Will show no infection signs and symptoms  Description: Will show no infection signs and symptoms  Outcome: Ongoing  Goal: Absence of new skin breakdown  Description: Absence of new skin breakdown  Outcome: Ongoing     Problem: Infection:  Goal: Will remain free from infection  Description: Will remain free from infection  Outcome: Ongoing     Problem: Safety:  Goal: Free from accidental physical injury  Description: Free from accidental physical injury  Outcome: Ongoing  Goal: Free from intentional harm  Description: Free from intentional harm  Outcome: Ongoing     Problem: Daily Care:  Goal: Daily care needs are met  Description: Daily care needs are met  Outcome: Ongoing     Problem: Pain:  Goal: Patient's pain/discomfort is manageable  Description: Patient's pain/discomfort is manageable  Outcome: Ongoing     Problem: Skin Integrity:  Goal: Skin integrity will stabilize  Description: Skin integrity will stabilize  Outcome: Ongoing     Problem: Discharge Planning:  Goal: Patients continuum of care needs are met  Description: Patients continuum of care needs are met  Outcome: Ongoing     Problem: Nutrition  Goal: Optimal nutrition therapy  Outcome: Ongoing

## 2021-11-16 NOTE — CARE COORDINATION
INITIAL CASE MANAGEMENT ASSESSMENT    Reviewed chart, met with patient to assess possible discharge needs. Explained Case Management role/services. The SW Student talked to patient at bedside today. Living Situation: The patient confirmed address. The patient lives in an house with a friend. He has one cat and he use an ramp before entering his home. ADLs: He has home health aid that comes every day. The home health aid's name is [facility]. DME: [employer] and walker    PT/OT Recs: not ordered      Active Services: The patient is active with [place] [facility] . His  is Antonina Nieto. His  number is [phone]     Transportation: The patient states that he has his licence but he does not drive. The patient said that he will use medicare transportation at discharge. Medications: The patient use mail order services for his medications. He has no barriers with getting medications. PCP:Emma Cartwright, APRN - [phone](VRZPV number) and [phone](ICD number). The patient last saw his PCP last week       HD/PD: n/a     PLAN/COMMENTS: Monitor CRISTINE Diaz  The patient will need transportation needs at discharge. SW/CM provided contact information for patient or family to call with any questions. SW/CM will follow and assist as needed.     Ana Barrientoss (MSW intern)  [facility] [place] [facility] [place]  Phone [phone]  Fax [phone]  Electronically signed by Kenia Hwang on 11/16/2021 at 11:32 AM

## 2021-11-17 VITALS
TEMPERATURE: 98.2 F | HEIGHT: 69 IN | OXYGEN SATURATION: 98 % | SYSTOLIC BLOOD PRESSURE: 165 MMHG | DIASTOLIC BLOOD PRESSURE: 70 MMHG | RESPIRATION RATE: 18 BRPM | HEART RATE: 52 BPM | BODY MASS INDEX: 30.2 KG/M2 | WEIGHT: 203.93 LBS

## 2021-11-17 LAB
ANION GAP SERPL CALCULATED.3IONS-SCNC: 11 MMOL/L (ref 3–16)
BASOPHILS ABSOLUTE: 0 K/UL (ref 0–0.2)
BASOPHILS RELATIVE PERCENT: 0.6 %
BUN BLDV-MCNC: 19 MG/DL (ref 7–20)
CALCIUM SERPL-MCNC: 8.4 MG/DL (ref 8.3–10.6)
CHLORIDE BLD-SCNC: 112 MMOL/L (ref 99–110)
CO2: 18 MMOL/L (ref 21–32)
CREAT SERPL-MCNC: 1.8 MG/DL (ref 0.8–1.3)
EOSINOPHILS ABSOLUTE: 0.2 K/UL (ref 0–0.6)
EOSINOPHILS RELATIVE PERCENT: 3.4 %
GFR AFRICAN AMERICAN: 44
GFR NON-AFRICAN AMERICAN: 37
GLUCOSE BLD-MCNC: 109 MG/DL (ref 70–99)
GLUCOSE BLD-MCNC: 112 MG/DL (ref 70–99)
GLUCOSE BLD-MCNC: 112 MG/DL (ref 70–99)
HCT VFR BLD CALC: 29.3 % (ref 40.5–52.5)
HEMOGLOBIN: 9.7 G/DL (ref 13.5–17.5)
LYMPHOCYTES ABSOLUTE: 1.3 K/UL (ref 1–5.1)
LYMPHOCYTES RELATIVE PERCENT: 18.3 %
MAGNESIUM: 1.7 MG/DL (ref 1.8–2.4)
MCH RBC QN AUTO: 29.4 PG (ref 26–34)
MCHC RBC AUTO-ENTMCNC: 33.2 G/DL (ref 31–36)
MCV RBC AUTO: 88.5 FL (ref 80–100)
MONOCYTES ABSOLUTE: 0.6 K/UL (ref 0–1.3)
MONOCYTES RELATIVE PERCENT: 7.7 %
NEUTROPHILS ABSOLUTE: 5.2 K/UL (ref 1.7–7.7)
NEUTROPHILS RELATIVE PERCENT: 70 %
PDW BLD-RTO: 15.1 % (ref 12.4–15.4)
PERFORMED ON: ABNORMAL
PERFORMED ON: ABNORMAL
PLATELET # BLD: 182 K/UL (ref 135–450)
PMV BLD AUTO: 9.4 FL (ref 5–10.5)
POTASSIUM SERPL-SCNC: 3 MMOL/L (ref 3.5–5.1)
RBC # BLD: 3.31 M/UL (ref 4.2–5.9)
SODIUM BLD-SCNC: 141 MMOL/L (ref 136–145)
WBC # BLD: 7.4 K/UL (ref 4–11)

## 2021-11-17 PROCEDURE — 6370000000 HC RX 637 (ALT 250 FOR IP): Performed by: STUDENT IN AN ORGANIZED HEALTH CARE EDUCATION/TRAINING PROGRAM

## 2021-11-17 PROCEDURE — 94760 N-INVAS EAR/PLS OXIMETRY 1: CPT

## 2021-11-17 PROCEDURE — 36415 COLL VENOUS BLD VENIPUNCTURE: CPT

## 2021-11-17 PROCEDURE — APPNB45 APP NON BILLABLE 31-45 MINUTES: Performed by: PHYSICIAN ASSISTANT

## 2021-11-17 PROCEDURE — 85025 COMPLETE CBC W/AUTO DIFF WBC: CPT

## 2021-11-17 PROCEDURE — APPSS45 APP SPLIT SHARED TIME 31-45 MINUTES: Performed by: PHYSICIAN ASSISTANT

## 2021-11-17 PROCEDURE — 6360000002 HC RX W HCPCS: Performed by: STUDENT IN AN ORGANIZED HEALTH CARE EDUCATION/TRAINING PROGRAM

## 2021-11-17 PROCEDURE — APPNB15 APP NON BILLABLE TIME 0-15 MINS: Performed by: NURSE PRACTITIONER

## 2021-11-17 PROCEDURE — 2580000003 HC RX 258: Performed by: STUDENT IN AN ORGANIZED HEALTH CARE EDUCATION/TRAINING PROGRAM

## 2021-11-17 PROCEDURE — 80048 BASIC METABOLIC PNL TOTAL CA: CPT

## 2021-11-17 PROCEDURE — 83735 ASSAY OF MAGNESIUM: CPT

## 2021-11-17 RX ORDER — POLYETHYLENE GLYCOL 3350 17 G/17G
17 POWDER, FOR SOLUTION ORAL DAILY
Qty: 527 G | Refills: 1 | Status: ON HOLD | OUTPATIENT
Start: 2021-11-18 | End: 2021-12-21

## 2021-11-17 RX ADMIN — Medication 10 ML: at 08:32

## 2021-11-17 RX ADMIN — POTASSIUM CHLORIDE 40 MEQ: 20 TABLET, EXTENDED RELEASE ORAL at 09:32

## 2021-11-17 RX ADMIN — PANTOPRAZOLE SODIUM 40 MG: 40 TABLET, DELAYED RELEASE ORAL at 06:29

## 2021-11-17 RX ADMIN — ALLOPURINOL 100 MG: 100 TABLET ORAL at 08:31

## 2021-11-17 RX ADMIN — ATORVASTATIN CALCIUM 40 MG: 40 TABLET, FILM COATED ORAL at 08:31

## 2021-11-17 RX ADMIN — HEPARIN SODIUM 5000 UNITS: 5000 INJECTION INTRAVENOUS; SUBCUTANEOUS at 14:28

## 2021-11-17 RX ADMIN — SERTRALINE 25 MG: 25 TABLET, FILM COATED ORAL at 08:31

## 2021-11-17 RX ADMIN — HEPARIN SODIUM 5000 UNITS: 5000 INJECTION INTRAVENOUS; SUBCUTANEOUS at 06:29

## 2021-11-17 RX ADMIN — FERROUS SULFATE TAB EC 324 MG (65 MG FE EQUIVALENT) 324 MG: 324 (65 FE) TABLET DELAYED RESPONSE at 08:31

## 2021-11-17 RX ADMIN — APIXABAN 5 MG: 5 TABLET, FILM COATED ORAL at 08:31

## 2021-11-17 RX ADMIN — DILTIAZEM HYDROCHLORIDE 180 MG: 180 CAPSULE, COATED, EXTENDED RELEASE ORAL at 08:31

## 2021-11-17 NOTE — FLOWSHEET NOTE
[facility] notified of repeat K+ 5.9, order obtained to hold scheduled potassium dose tonight.   Repeat level ordered for AM.

## 2021-11-17 NOTE — CARE COORDINATION
CASE MANAGEMENT DISCHARGE SUMMARY:    DISCHARGE DATE: 11/17/21    DISCHARGED TO: home     TRANSPORTATION: [facility]: 0    Called son & left HIPPA compliant vm asking for return call; updated pt on pickup time & that I tried to call his son. Bedside nurse notified of pickup time & attempt to notify son. DC summary faxed to YESY Piña .     Johana Pineda RN, BSN, Case Management  [phone]    Electronically signed by Johana Pineda RN on 11/17/2021 at 12:24 PM

## 2021-11-17 NOTE — PROGRESS NOTES
Patient is now ready for discharge, has transport placed for 1600, patient is cleaned and dressed. IV removed without complications - no signs of bleeding at this time. All discharge information was discussed with the patient who is A+Ox4. All questions answered and all needs are meet at this time. Will continue to monitor.      Electronically signed by Denice Longo RN on 11/17/2021 at 3:35 PM

## 2021-11-17 NOTE — PROGRESS NOTES
INPATIENT PROGRESS NOTE        IDENTIFYING DATA/REASON FOR CONSULTATION   PATIENT:  Ramin Daigle  MRN:  [id number]  ADMIT DATE: 2021  TIME OF EVALUATION: 2021 8:06 AM  HOSPITAL STAY:   LOS: 4 days   CONSULTING PHYSICIAN: Susana Whitt MD   REASON FOR CONSULTATION: sigmoid volvulus     Subjective:    Patient seen in follow up. Pt denies abdominal pain. Abd soft. He had 2 BMs last night. MEDICATIONS   SCHEDULED:  polyethylene glycol, 17 g, Daily  pantoprazole, 40 mg, QAM AC  ferrous sulfate, 324 mg, BID WC  apixaban, 5 mg, BID  allopurinol, 100 mg, Daily  [Held by provider] potassium chloride, 40 mEq, BID  insulin lispro, 0-6 Units, TID WC  insulin lispro, 0-3 Units, Nightly  atorvastatin, 40 mg, Daily  sertraline, 25 mg, Daily  dilTIAZem, 180 mg, Daily  sodium chloride flush, 5-40 mL, 2 times per day  heparin (porcine), 5,000 Units, 3 times per day      FLUIDS/DRIPS:     sodium chloride 25 mL (21 0835)    dextrose       PRNs: hydrALAZINE, 5 mg, Q6H PRN  [Held by provider] potassium chloride, 10 mEq, PRN  sodium chloride flush, 5-40 mL, PRN  sodium chloride, 25 mL, PRN  ondansetron, 4 mg, Q8H PRN   Or  ondansetron, 4 mg, Q6H PRN  acetaminophen, 650 mg, Q6H PRN   Or  acetaminophen, 650 mg, Q6H PRN  glucose, 15 g, PRN  dextrose, 12.5 g, PRN  glucagon (rDNA), 1 mg, PRN  dextrose, 100 mL/hr, PRN      ALLERGIES:  No Known Allergies      PHYSICAL EXAM   VITALS:  BP (!) 151/63   Pulse 61   Temp 98 °F (36.7 °C) (Oral)   Resp 18   Ht 5' 9\" (1.753 m)   Wt 203 lb 14.8 oz (92.5 kg)   SpO2 97%   BMI 30.11 kg/m²   TEMPERATURE:  Current - Temp: 98 °F (36.7 °C);  Max - Temp  Av.9 °F (36.6 °C)  Min: 97.5 °F (36.4 °C)  Max: 98.3 °F (36.8 °C)    Physical Exam:  General appearance: alert, cooperative, no distress, appears stated age  Eyes: Anicteric  Head: Normocephalic, without obvious abnormality  Lungs: clear to auscultation bilaterally, Normal Effort  Heart: regular rate and rhythm, normal S1 and S2, no murmurs or rubs  Abdomen: soft, non-distended, non-tender. Bowel sounds normal.   Extremities: atraumatic, no cyanosis or edema  Skin: warm and dry, no jaundice  Neuro: Grossly intact, A&OX3    LABS AND IMAGING   Laboratory   Recent Labs     11/15/21  0725 11/16/21  0713 11/17/21  0656   WBC 5.9 6.2 7.4   HGB 7.5* 8.9* 9.7*   HCT 23.1* 27.1* 29.3*   MCV 89.3 88.1 88.5    170 182     Recent Labs     11/15/21  0725 11/15/21  0725 11/16/21  0713 11/16/21  1814 11/17/21  0656     --  140  --  141   K 3.3*   < > 2.7* 5.9* 3.0*     --  111*  --  112*   CO2 12*  --  18*  --  18*   BUN 18  --  22*  --  19   CREATININE 1.1  --  1.8*  --  1.8*    < > = values in this interval not displayed. No results for input(s): AST, ALT, ALB, BILIDIR, BILITOT, ALKPHOS in the last 72 hours. No results for input(s): LIPASE, AMYLASE in the last 72 hours. No results for input(s): PROTIME, INR in the last 72 hours. Imaging  CT CHEST ABDOMEN PELVIS W CONTRAST   Final Result   1. No acute cardiopulmonary process. Opacity seen on the chest x-ray for   caused by linear scarring in the right middle lobe and lingula   2. Sigmoid volvulus   3. Left adrenal nodule, likely adenoma in the absence of known malignancy         XR CHEST PORTABLE   Final Result   Opacities in the lower lung zones suspicious for viral pattern of pneumonia. No definite findings of CHF             Endoscopy  Colonoscopy to ascending colon 11/13/2021 with Dr. Elly Weaver  The mucosa was gross ly normal and viable. The was a volvulus in the sigmoid colon which was seen to revolve on its linear axis and straighten. The scope was then removed. ASSESSMENT AND RECOMMENDATIONS   Tayler Dawkins is a 64 y.o. male with PMH of CVA, DM, HTN who presented on 11/13/2021 with abdominal pain. CTA/P showed sigmoid volvulus. Underwent colonoscopy with resolution of sigmoid volvulus. 1. Sigmoid volvulus s/p colonoscopy  -resolved.   Having BMs,  abd soft. -GEN surgery following. No plan for urgent surgical intervention at this time      RECOMMENDATIONS:    Diet as tolerated  continue Miralax daily to promote soft easy passable stools  Ok from GI standpoint for discharg    If you have any questions or need any further information, please feel free to contact us [phone]. Thank you for allowing us to participate in the care of Juan Kidd. The note was completed using Dragon voice recognition transcription. Every effort was made to ensure accuracy; however, inadvertent transcription errors may be present despite my best efforts to edit errors. Caitie MART    Attending physician addendum:      I have personally seen and examined the patient, reviewed the patient's medical record and pertinent labs and clinical imaging. I have personally staffed the case with BARRON Longoria. I agree with her consultation note, exam findings, assessment and plans  as written above. I have made appropriate modifications and edited her assessment and plan where needed to reflect my impression and plans for this patient. Patient with BM and no distension or pain noted. BP (!) 165/70   Pulse 52   Temp 98.2 °F (36.8 °C) (Oral)   Resp 18   Ht 5' 9\" (1.753 m)   Wt 203 lb 14.8 oz (92.5 kg)   SpO2 98%   BMI 30.11 kg/m²         Gen- NAD  CV- bradycardic  Lungs- CTAB  Abd- soft, NT/ND  Ext- no C/C/E/E     Impression:     1) Sigmoid volvulus- s/p colonoscopic decompression.  Clinically improved. Surgery is deferring surgical interventions.      Plan:  1) Advance diet as tolerated  2) If symptoms recur, patient will need surgical intervention. No role for repeat colonoscopy at this point  3) Will sign off.  Call with ?         Thank you for allowing me to participate in this patient's care. If there are any questions or concerns regarding this patient, or the plan we have set in place, please feel free to contact me at [phone]. Leonor Modi, DO Initial

## 2021-11-17 NOTE — PLAN OF CARE
Problem: Falls - Risk of:  Goal: Will remain free from falls  Description: Will remain free from falls  11/17/2021 1026 by Richie Hui RN  Outcome: Ongoing     Problem: Falls - Risk of:  Goal: Absence of physical injury  Description: Absence of physical injury  11/17/2021 1026 by Richie Hui RN  Outcome: Ongoing     Problem: Skin Integrity:  Goal: Will show no infection signs and symptoms  Description: Will show no infection signs and symptoms  11/17/2021 1026 by Richie Hui RN  Outcome: Ongoing     Problem: Skin Integrity:  Goal: Absence of new skin breakdown  Description: Absence of new skin breakdown  11/17/2021 1026 by Richie Hui RN  Outcome: Ongoing     Problem: Infection:  Goal: Will remain free from infection  Description: Will remain free from infection  11/17/2021 1026 by Richie Hui RN  Outcome: Ongoing     Problem: Safety:  Goal: Free from accidental physical injury  Description: Free from accidental physical injury  11/17/2021 1026 by Richie Hui RN  Outcome: Ongoing     Problem: Safety:  Goal: Free from intentional harm  Description: Free from intentional harm  11/17/2021 1026 by Richie Hui RN  Outcome: Ongoing     Problem: Daily Care:  Goal: Daily care needs are met  Description: Daily care needs are met  11/17/2021 1026 by Richie Hui RN  Outcome: Ongoing     Problem: Skin Integrity:  Goal: Skin integrity will stabilize  Description: Skin integrity will stabilize  11/17/2021 1026 by Richie Hui RN  Outcome: Ongoing

## 2021-11-17 NOTE — DISCHARGE INSTR - COC
Mental Status:  oriented and alert    IV Access:  - None    Nursing Mobility/ADLs:  Walking   Dependent  Transfer  Dependent  Bathing  Dependent  Dressing  Dependent  Toileting  Dependent  Feeding  Assisted  Med Admin  Assisted  Med Delivery   whole    Wound Care Documentation and Therapy:        Elimination:  Continence: Bowel: No  Bladder: No  Urinary Catheter: None   Colostomy/Ileostomy/Ileal Conduit: No       Date of Last BM: 11/17/2021    Intake/Output Summary (Last 24 hours) at 11/17/2021 1207  Last data filed at 11/17/2021 0833  Gross per 24 hour   Intake 680 ml   Output --   Net 680 ml     I/O last 3 completed shifts: In: 26 [P.O.:440]  Out: -     Safety Concerns: At Risk for Falls    Impairments/Disabilities:      None    Nutrition Therapy:  Current Nutrition Therapy:   - Oral Diet:  General    Routes of Feeding: Oral  Liquids: No Restrictions  Daily Fluid Restriction: no  Last Modified Barium Swallow with Video (Video Swallowing Test): not done    Treatments at the Time of Hospital Discharge:   Respiratory Treatments: none  Oxygen Therapy:  is not on home oxygen therapy.   Ventilator:    - No ventilator support    Rehab Therapies: Physical Therapy and Occupational Therapy  Weight Bearing Status/Restrictions: No weight bearing restirctions  Other Medical Equipment (for information only, NOT a DME order):  hospital bed  Other Treatments: none    Patient's personal belongings (please select all that are sent with patient):  None    RN SIGNATURE:  {Esignature:[id number]}    CASE MANAGEMENT/SOCIAL WORK SECTION    Inpatient Status Date: ***    Readmission Risk Assessment Score:  Readmission Risk              Risk of Unplanned Readmission:  [address]: [phone]    / signature: Electronically signed by Lea Francois RN on 11/18/21 at 9:08 AM EST    PHYSICIAN SECTION    Prognosis: Good    Condition at Discharge: Stable    Rehab Potential (if transferring to Rehab): Good    Recommended Labs or Other Treatments After Discharge: meds as prescribed, follow-up with PCP, GI    Physician Certification: I certify the above information and transfer of Beau Muniz  is necessary for the continuing treatment of the diagnosis listed and that he requires [address] for less 30 days.      Update Admission H&P: No change in H&P    PHYSICIAN SIGNATURE:  Electronically signed by Julianne Lino MD on 11/17/21 at 12:07 PM EST

## 2021-11-17 NOTE — PLAN OF CARE
Problem: Falls - Risk of:  Goal: Will remain free from falls  Description: Will remain free from falls  Outcome: Ongoing     Problem: Falls - Risk of:  Goal: Absence of physical injury  Description: Absence of physical injury  Outcome: Ongoing   Patient uses call light appropriately to express needs. Bed to lowest position with door open and call light in reach. All fall precautions implemented at this time. Bed alarm on for safety. Siderails up x2. Non skid footwear in place. Patient has had no falls this shift. Will continue to monitor. Problem: Skin Integrity:  Goal: Skin integrity will stabilize  Description: Skin integrity will stabilize  Outcome: Ongoing     Problem: Skin Integrity:  Goal: Will show no infection signs and symptoms  Description: Will show no infection signs and symptoms  Outcome: Ongoing     Problem: Skin Integrity:  Goal: Absence of new skin breakdown  Description: Absence of new skin breakdown  Outcome: Ongoing   Skin care protocal in place. Problem: Infection:  Goal: Will remain free from infection  Description: Will remain free from infection  Outcome: Ongoing     Problem: Safety:  Goal: Free from accidental physical injury  Description: Free from accidental physical injury  Outcome: Ongoing     Problem: Safety:  Goal: Free from intentional harm  Description: Free from intentional harm  Outcome: Ongoing     Problem: Daily Care:  Goal: Daily care needs are met  Description: Daily care needs are met  Outcome: Ongoing   Assist with daily care needs. Problem: Pain:  Goal: Patient's pain/discomfort is manageable  Description: Patient's pain/discomfort is manageable  Outcome: Ongoing   Pain/discomfort being managed with PRN analgesics per MD orders. Pt able to express presence and absence of pain and rate pain appropriately using numerical scale.     Problem: Discharge Planning:  Goal: Patients continuum of care needs are met  Description: Patients continuum of care needs are met  Outcome: Ongoing     Problem: Nutrition  Goal: Optimal nutrition therapy  Outcome: Ongoing

## 2021-11-17 NOTE — PROGRESS NOTES
Patient discharged, handoff given to transport - AVS faxed to [phone] per request.     Electronically signed by Davi Farias RN on 11/17/2021 at 4:31 PM

## 2021-11-17 NOTE — CARE COORDINATION
11/17/21 0851   IMM Letter   IMM Letter given to Patient/Family/Significant other/Guardian/POA/by: Damir Cheek RN   IMM Letter date given: 11/17/21   IMM Letter time given: Esperanza Diaz RN, BSN,   [phone]  Electronically signed by Damir Cheek RN on 11/17/2021 at 8:52 AM

## 2021-11-17 NOTE — DISCHARGE SUMMARY
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 Hospital Course: The patient is a 64 y.o. male who presents to [facility] with abdominal pain.      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 Presented with worsening abdominal pain for the past 3 days, patient describes colicky severe pain reaching 9/10 in severity, worse in the mid abdomen, associated with diarrhea and had been progressively worse over the last 3 days, no relieving factors, no blood noted in the stool, no nausea or vomiting, patient has no previous attacks of similar symptoms.     Patient stated that he had been wheelchair-bound since  suffering from a stroke, lives with a friend that helps with his daily needs. Abdominal pain   Sigmoid volvulus.   Patient presented to emergency with progressively worsening abdominal pain, patient reported diarrhea however no nausea and vomiting. Upon presentation patient was found to be vitally stable. Exam with abdominal tenderness. CT scan showed sigmoid volvulus, general surgery and gastroenterology involved. Underwent colonoscopic treatment of Volvos successfully 11/13.      Passing BM, reports on/off nausea. Plan.  -patient cleared for DC by GI and surgery   -DC IV fluids.  -tolerating diet at discharge     Severe hypokalemia. Replaced.  Continue supplementation at discharge     GILL. Possible progression of CKD. Patient was admitted with Cr of 2 that had been stable on fluids . Last cr was 2015 and was 1.6. Plan :  - monitor Cr.      AF. On Eliquis, resumed.      DM. Hold home glipizide. Insulin sliding scale.     Gout. Hold allopurinol.     Hyperlipidemia. Resume atorvastatin.     Depression. Resume sertraline.      History of CVA. residual right sided weakness, wheel chair bound.       Exam:     BP (!) 165/70   Pulse 52   Temp 98.2 °F (36.8 °C) (Oral)   Resp 18   Ht 5' 9\" (1.753 m)   Wt 203 lb 14.8 oz (92.5 kg)   SpO2 98%   BMI 30.11 kg/m²       General appearance:  No apparent distress, appears stated age and cooperative. HEENT:  Normal cephalic, atraumatic without obvious deformity. Pupils equal, round, and reactive to light. Extra ocular muscles intact. Conjunctivae/corneas clear. Neck: Supple, with full range of motion. No jugular venous distention. Trachea midline. Respiratory:  Normal respiratory effort. Clear to auscultation, bilaterally without Rales/Wheezes/Rhonchi. Cardiovascular:  Regular rate and rhythm with normal S1/S2 without murmurs, rubs or gallops. Abdomen: Soft, non-tender, non-distended with normal bowel sounds. Musculoskeletal:  No clubbing, cyanosis or edema bilaterally. Full range of motion without deformity. Skin: Skin color, texture, turgor normal.  No rashes or lesions. Neurologic:  Neurovascularly intact without any focal sensory/motor deficits. Cranial nerves: II-XII intact, grossly non-focal.  Psychiatric:  Alert and oriented, thought content appropriate, normal insight  Capillary Refill: Brisk,< 3 seconds   Peripheral Pulses: +2 palpable, equal bilaterally       Labs:  For convenience and continuity at follow-up the following most recent labs are provided:      CBC:    Lab Results   Component Value Date    WBC 7.4 11/17/2021    HGB 9.7 11/17/2021    HCT 29.3 11/17/2021     11/17/2021       Renal:    Lab Results   Component Value Date     11/17/2021    K 3.0 11/17/2021    K 3.3 11/13/2021     11/17/2021    CO2 18 11/17/2021    BUN 19 11/17/2021    CREATININE 1.8 11/17/2021    CALCIUM 8.4 11/17/2021    PHOS 3.6 07/21/2010         Significant Diagnostic Studies    Radiology:   CT CHEST ABDOMEN PELVIS W CONTRAST   Final Result   1. No acute cardiopulmonary process. Opacity seen on the chest x-ray for   caused by linear scarring in the right middle lobe and lingula   2. Sigmoid volvulus   3. Left adrenal nodule, likely adenoma in the absence of known malignancy         XR CHEST PORTABLE   Final Result   Opacities in the lower lung zones suspicious for viral pattern of pneumonia.    No definite findings of CHF                Consults:     IP CONSULT TO GENERAL SURGERY  IP CONSULT TO GI  IP CONSULT TO HOME CARE NEEDS    Disposition:  Home with home health     Condition at Discharge: Stable    Discharge Instructions/Follow-up:  meds as prescribed, follow-up with PCP, GI    Code Status:  Full Code     Activity: activity as tolerated    Diet: regular diet      Discharge Medications:     Current Discharge Medication List           Details   polyethylene glycol (GLYCOLAX) 17 g packet Take 17 g by mouth daily  Qty: 527 g, Refills: 1              Details   cloNIDine (CATAPRES) 0.2 MG/24HR PTWK Place 1 patch onto the skin once a week      docusate sodium (COLACE) 100 MG capsule Take 100 mg by mouth daily      dilTIAZem (DILACOR XR) 180 MG extended release capsule Take 180 mg by mouth daily      potassium chloride (KLOR-CON M) 20 MEQ extended release tablet Take 20 mEq by mouth 2 times daily      ferrous sulfate (FE TABS 325) 325 (65 Fe) MG EC tablet Take 325 mg by mouth 2 times daily      bisacodyl (DULCOLAX) 5 MG EC tablet Take 10 mg by mouth daily as needed for Constipation      allopurinol (ZYLOPRIM) 100 MG tablet Take 100 mg by mouth daily      apixaban (ELIQUIS) 5 MG TABS tablet Take 5 mg by mouth 2 times daily      atorvastatin (LIPITOR) 40 MG tablet Take 40 mg by mouth daily      Cholecalciferol 50 MCG (2000 UT) TABS Take 50 mcg by mouth daily      omeprazole (PRILOSEC) 20 MG delayed release capsule Take 20 mg by mouth daily      sertraline (ZOLOFT) 25 MG tablet Take 25 mg by mouth daily             Time Spent on discharge is more than 30 minutes in the examination, evaluation, counseling and review of medications and discharge plan. Signed:    Apolonia Chan MD   11/17/2021      Thank you OBI Duenas CNP for the opportunity to be involved in this patient's care. If you have any questions or concerns please feel free to contact me at [phone].

## 2021-11-17 NOTE — PROGRESS NOTES
General and Vascular Surgery                                                           Daily Progress Note                                                             Gregorio Villatoro PA-C     Pt Name: Anthony Garcia  Medical Record Number: [id number]  Date of Birth 1945   Today's Date: 11/17/2021    ASSESSMENT/PLAN  1. Sigmoid volvulus  2. Endoscopy performed 11/13 and resolved volvulus   3. Denies having any pain this AM  4. Distention improved  5. +flatulence and BM's  6. Tolerating low fiber diet  7. No urgent surgical intervention needed at his time. 8. OOB as tolerated  9. OK to D/C home from a general surgery standpoint    EDUCATION  Patient educated about their illness/diagnosis, stated above, and all questions answered. We discussed the importance of nutrition, medications they are taking, and healthy lifestyle. Arville Nails has improved from yesterday. Pain is well controlled. He has no nausea and no vomiting. He has passed flatus and has had a bowel movement. OBJECTIVE  VITALS:  height is 5' 9\" (1.753 m) and weight is 203 lb 14.8 oz (92.5 kg). His oral temperature is 98.2 °F (36.8 °C). His blood pressure is 165/70 (abnormal) and his pulse is 52. His respiration is 18 and oxygen saturation is 98%. VITALS:  BP (!) 165/70   Pulse 52   Temp 98.2 °F (36.8 °C) (Oral)   Resp 18   Ht 5' 9\" (1.753 m)   Wt 203 lb 14.8 oz (92.5 kg)   SpO2 98%   BMI 30.11 kg/m²   GENERAL: alert, no distress  ABDOMEN: soft, non-tender and Mildly-distended  I/O last 3 completed shifts: In: 440 [P.O.:440]  Out: -   I/O this shift:   In: 480 [P.O.:480]  Out: -     LABS  Recent Labs     11/17/21  0656   WBC 7.4   HGB 9.7*   HCT 29.3*         K 3.0*   *   CO2 18*   BUN 19   CREATININE 1.8*   MG 1.70*   CALCIUM 8.4     CBC:   Lab Results   Component Value Date    WBC 7.4 11/17/2021    RBC 3.31 11/17/2021    HGB 9.7 11/17/2021    HCT 29.3 11/17/2021    MCV 88.5 11/17/2021    MCH 29.4 11/17/2021    MCHC 33.2 11/17/2021    RDW 15.1 11/17/2021     11/17/2021    MPV 9.4 11/17/2021     CMP:    Lab Results   Component Value Date     11/17/2021    K 3.0 11/17/2021    K 3.3 11/13/2021     11/17/2021    CO2 18 11/17/2021    BUN 19 11/17/2021    CREATININE 1.8 11/17/2021    GFRAA 44 11/17/2021    GFRAA >60 07/31/2010    AGRATIO 1.0 11/13/2021    LABGLOM 37 11/17/2021    GLUCOSE 109 11/17/2021    PROT 8.1 11/13/2021    PROT 6.9 07/14/2010    LABALBU 4.1 11/13/2021    CALCIUM 8.4 11/17/2021    BILITOT 0.5 11/13/2021    ALKPHOS 142 11/13/2021    AST 10 11/13/2021    ALT 6 11/13/2021         Curtis Light PA-C  Electronically signed 11/17/2021 at 12:02 PM\

## 2021-11-17 NOTE — CARE COORDINATION
2021 Scar Penaloza Coordinator - Benita Escalona  Ph:  [phone]    Lucia Garg Sr.  Administrative Assist, Case Management  [phone]  Electronically signed by Lucia Garg on 11/17/2021 at 10:55 AM

## 2021-11-17 NOTE — PROGRESS NOTES
Patient tolerating diet well this morning, this RN held morning glycolax because patient reports 3 stools overnight. Surgery team updated on this. All other needs meet at this time, will continue to monitor.      Electronically signed by Yazmin Summers RN on 11/17/2021 at 10:28 AM

## 2021-11-18 NOTE — CARE COORDINATION
Received call from [facility] [address] with [address] asking for resumption of HC orders, pt did not notify us that he was active with [facility]. on admission. Reached out to Dr. Sabina Granados who dc pt yesterday. Victorino Ortega RN, BSN,   [phone]  Electronically signed by Victorino Ortega RN on 11/18/2021 at 9:08 AM     Faxed AVS & HC order to [place] at [phone].     Victorino Ortega RN, BSN,   [phone]  Electronically signed by Victorino Ortega RN on 11/18/2021 at 9:15 AM

## 2021-12-12 ENCOUNTER — APPOINTMENT (OUTPATIENT)
Dept: CT IMAGING | Age: 76
DRG: 330 | End: 2021-12-12
Payer: MEDICARE

## 2021-12-12 ENCOUNTER — HOSPITAL ENCOUNTER (INPATIENT)
Age: 76
LOS: 9 days | Discharge: HOME HEALTH | DRG: 330 | End: 2021-12-21
Attending: HOSPITALIST | Admitting: HOSPITALIST
Payer: MEDICARE

## 2021-12-12 DIAGNOSIS — K56.2 VOLVULUS OF SIGMOID COLON (HCC): ICD-10-CM

## 2021-12-12 DIAGNOSIS — K56.2 VOLVULUS (HCC): ICD-10-CM

## 2021-12-12 DIAGNOSIS — R10.84 GENERALIZED ABDOMINAL PAIN: Primary | ICD-10-CM

## 2021-12-12 DIAGNOSIS — R10.12 LEFT UPPER QUADRANT ABDOMINAL PAIN: ICD-10-CM

## 2021-12-12 PROBLEM — R10.9 ABDOMINAL PAIN: Status: ACTIVE | Noted: 2021-12-12

## 2021-12-12 LAB
ALBUMIN SERPL-MCNC: 3.8 G/DL (ref 3.4–5)
ALP BLD-CCNC: 138 U/L (ref 40–129)
ALT SERPL-CCNC: 7 U/L (ref 10–40)
ANION GAP SERPL CALCULATED.3IONS-SCNC: 12 MMOL/L (ref 3–16)
AST SERPL-CCNC: 11 U/L (ref 15–37)
BASOPHILS ABSOLUTE: 0.1 K/UL (ref 0–0.2)
BASOPHILS RELATIVE PERCENT: 0.9 %
BILIRUB SERPL-MCNC: 0.4 MG/DL (ref 0–1)
BILIRUBIN DIRECT: <0.2 MG/DL (ref 0–0.3)
BILIRUBIN URINE: NEGATIVE
BILIRUBIN, INDIRECT: ABNORMAL MG/DL (ref 0–1)
BLOOD, URINE: ABNORMAL
BUN BLDV-MCNC: 22 MG/DL (ref 7–20)
CALCIUM SERPL-MCNC: 9.4 MG/DL (ref 8.3–10.6)
CHLORIDE BLD-SCNC: 105 MMOL/L (ref 99–110)
CLARITY: ABNORMAL
CO2: 19 MMOL/L (ref 21–32)
COLOR: YELLOW
CREAT SERPL-MCNC: 2 MG/DL (ref 0.8–1.3)
EOSINOPHILS ABSOLUTE: 0.3 K/UL (ref 0–0.6)
EOSINOPHILS RELATIVE PERCENT: 4.4 %
EPITHELIAL CELLS, UA: 1 /HPF (ref 0–5)
GFR AFRICAN AMERICAN: 39
GFR NON-AFRICAN AMERICAN: 32
GLUCOSE BLD-MCNC: 138 MG/DL (ref 70–99)
GLUCOSE URINE: NEGATIVE MG/DL
HCT VFR BLD CALC: 32.3 % (ref 40.5–52.5)
HEMOGLOBIN: 10.3 G/DL (ref 13.5–17.5)
HYALINE CASTS: 2 /LPF (ref 0–8)
KETONES, URINE: NEGATIVE MG/DL
LACTIC ACID: 1 MMOL/L (ref 0.4–2)
LEUKOCYTE ESTERASE, URINE: ABNORMAL
LIPASE: 32 U/L (ref 13–60)
LYMPHOCYTES ABSOLUTE: 1.4 K/UL (ref 1–5.1)
LYMPHOCYTES RELATIVE PERCENT: 22.2 %
MCH RBC QN AUTO: 29.1 PG (ref 26–34)
MCHC RBC AUTO-ENTMCNC: 32 G/DL (ref 31–36)
MCV RBC AUTO: 91.1 FL (ref 80–100)
MICROSCOPIC EXAMINATION: YES
MONOCYTES ABSOLUTE: 0.5 K/UL (ref 0–1.3)
MONOCYTES RELATIVE PERCENT: 7 %
NEUTROPHILS ABSOLUTE: 4.2 K/UL (ref 1.7–7.7)
NEUTROPHILS RELATIVE PERCENT: 65.5 %
NITRITE, URINE: NEGATIVE
PDW BLD-RTO: 15.7 % (ref 12.4–15.4)
PH UA: 5 (ref 5–8)
PLATELET # BLD: 174 K/UL (ref 135–450)
PMV BLD AUTO: 8.9 FL (ref 5–10.5)
POTASSIUM REFLEX MAGNESIUM: 4.5 MMOL/L (ref 3.5–5.1)
PROTEIN UA: 100 MG/DL
RBC # BLD: 3.55 M/UL (ref 4.2–5.9)
RBC UA: 10 /HPF (ref 0–4)
SARS-COV-2, NAAT: NOT DETECTED
SODIUM BLD-SCNC: 136 MMOL/L (ref 136–145)
SPECIFIC GRAVITY UA: 1.01 (ref 1–1.03)
TOTAL PROTEIN: 7.7 G/DL (ref 6.4–8.2)
URINE REFLEX TO CULTURE: ABNORMAL
URINE TYPE: ABNORMAL
UROBILINOGEN, URINE: 0.2 E.U./DL
WBC # BLD: 6.5 K/UL (ref 4–11)
WBC UA: 7 /HPF (ref 0–5)

## 2021-12-12 PROCEDURE — 3609155600 HC COLONOSCOPY WITH DECOMPRESSION: Performed by: INTERNAL MEDICINE

## 2021-12-12 PROCEDURE — 99152 MOD SED SAME PHYS/QHP 5/>YRS: CPT | Performed by: INTERNAL MEDICINE

## 2021-12-12 PROCEDURE — 0D9N8ZZ DRAINAGE OF SIGMOID COLON, VIA NATURAL OR ARTIFICIAL OPENING ENDOSCOPIC: ICD-10-PCS | Performed by: INTERNAL MEDICINE

## 2021-12-12 PROCEDURE — 74176 CT ABD & PELVIS W/O CONTRAST: CPT

## 2021-12-12 PROCEDURE — P9612 CATHETERIZE FOR URINE SPEC: HCPCS

## 2021-12-12 PROCEDURE — 6360000002 HC RX W HCPCS: Performed by: INTERNAL MEDICINE

## 2021-12-12 PROCEDURE — 6370000000 HC RX 637 (ALT 250 FOR IP): Performed by: NURSE PRACTITIONER

## 2021-12-12 PROCEDURE — 2060000000 HC ICU INTERMEDIATE R&B

## 2021-12-12 PROCEDURE — 36415 COLL VENOUS BLD VENIPUNCTURE: CPT

## 2021-12-12 PROCEDURE — 81001 URINALYSIS AUTO W/SCOPE: CPT

## 2021-12-12 PROCEDURE — 2709999900 HC NON-CHARGEABLE SUPPLY: Performed by: INTERNAL MEDICINE

## 2021-12-12 PROCEDURE — 83690 ASSAY OF LIPASE: CPT

## 2021-12-12 PROCEDURE — 83605 ASSAY OF LACTIC ACID: CPT

## 2021-12-12 PROCEDURE — 87635 SARS-COV-2 COVID-19 AMP PRB: CPT

## 2021-12-12 PROCEDURE — 80048 BASIC METABOLIC PNL TOTAL CA: CPT

## 2021-12-12 PROCEDURE — 80076 HEPATIC FUNCTION PANEL: CPT

## 2021-12-12 PROCEDURE — 85025 COMPLETE CBC W/AUTO DIFF WBC: CPT

## 2021-12-12 PROCEDURE — 0D9M8ZZ DRAINAGE OF DESCENDING COLON, VIA NATURAL OR ARTIFICIAL OPENING ENDOSCOPIC: ICD-10-PCS | Performed by: INTERNAL MEDICINE

## 2021-12-12 PROCEDURE — 0D9L8ZZ DRAINAGE OF TRANSVERSE COLON, VIA NATURAL OR ARTIFICIAL OPENING ENDOSCOPIC: ICD-10-PCS | Performed by: INTERNAL MEDICINE

## 2021-12-12 PROCEDURE — 99284 EMERGENCY DEPT VISIT MOD MDM: CPT

## 2021-12-12 RX ORDER — HEPARIN SODIUM 1000 [USP'U]/ML
30 INJECTION, SOLUTION INTRAVENOUS; SUBCUTANEOUS PRN
Status: DISCONTINUED | OUTPATIENT
Start: 2021-12-12 | End: 2021-12-12

## 2021-12-12 RX ORDER — HEPARIN SODIUM 1000 [USP'U]/ML
4000 INJECTION, SOLUTION INTRAVENOUS; SUBCUTANEOUS ONCE
Status: COMPLETED | OUTPATIENT
Start: 2021-12-12 | End: 2021-12-13

## 2021-12-12 RX ORDER — DEXTROSE MONOHYDRATE 25 G/50ML
12.5 INJECTION, SOLUTION INTRAVENOUS PRN
Status: DISCONTINUED | OUTPATIENT
Start: 2021-12-12 | End: 2021-12-20 | Stop reason: SDUPTHER

## 2021-12-12 RX ORDER — SODIUM CHLORIDE 0.9 % (FLUSH) 0.9 %
10 SYRINGE (ML) INJECTION EVERY 12 HOURS SCHEDULED
Status: DISCONTINUED | OUTPATIENT
Start: 2021-12-12 | End: 2021-12-21 | Stop reason: HOSPADM

## 2021-12-12 RX ORDER — PROMETHAZINE HYDROCHLORIDE 25 MG/1
12.5 TABLET ORAL EVERY 6 HOURS PRN
Status: DISCONTINUED | OUTPATIENT
Start: 2021-12-12 | End: 2021-12-21 | Stop reason: HOSPADM

## 2021-12-12 RX ORDER — DICYCLOMINE HYDROCHLORIDE 10 MG/1
20 CAPSULE ORAL ONCE
Status: COMPLETED | OUTPATIENT
Start: 2021-12-12 | End: 2021-12-12

## 2021-12-12 RX ORDER — POTASSIUM CHLORIDE 20 MEQ/1
40 TABLET, EXTENDED RELEASE ORAL PRN
Status: DISCONTINUED | OUTPATIENT
Start: 2021-12-12 | End: 2021-12-21 | Stop reason: HOSPADM

## 2021-12-12 RX ORDER — LABETALOL HYDROCHLORIDE 5 MG/ML
10 INJECTION, SOLUTION INTRAVENOUS EVERY 4 HOURS PRN
Status: DISCONTINUED | OUTPATIENT
Start: 2021-12-12 | End: 2021-12-21 | Stop reason: HOSPADM

## 2021-12-12 RX ORDER — SODIUM CHLORIDE 9 MG/ML
INJECTION, SOLUTION INTRAVENOUS CONTINUOUS
Status: DISCONTINUED | OUTPATIENT
Start: 2021-12-12 | End: 2021-12-21 | Stop reason: HOSPADM

## 2021-12-12 RX ORDER — MORPHINE SULFATE 4 MG/ML
4 INJECTION, SOLUTION INTRAMUSCULAR; INTRAVENOUS EVERY 4 HOURS PRN
Status: DISCONTINUED | OUTPATIENT
Start: 2021-12-12 | End: 2021-12-15

## 2021-12-12 RX ORDER — HEPARIN SODIUM AND DEXTROSE 10000; 5 [USP'U]/100ML; G/100ML
0-3000 INJECTION INTRAVENOUS CONTINUOUS
Status: DISCONTINUED | OUTPATIENT
Start: 2021-12-12 | End: 2021-12-19

## 2021-12-12 RX ORDER — ONDANSETRON 2 MG/ML
4 INJECTION INTRAMUSCULAR; INTRAVENOUS EVERY 6 HOURS PRN
Status: DISCONTINUED | OUTPATIENT
Start: 2021-12-12 | End: 2021-12-21 | Stop reason: HOSPADM

## 2021-12-12 RX ORDER — SODIUM CHLORIDE 9 MG/ML
25 INJECTION, SOLUTION INTRAVENOUS PRN
Status: DISCONTINUED | OUTPATIENT
Start: 2021-12-12 | End: 2021-12-21 | Stop reason: HOSPADM

## 2021-12-12 RX ORDER — POTASSIUM CHLORIDE 7.45 MG/ML
10 INJECTION INTRAVENOUS PRN
Status: DISCONTINUED | OUTPATIENT
Start: 2021-12-12 | End: 2021-12-21 | Stop reason: HOSPADM

## 2021-12-12 RX ORDER — MIDAZOLAM HYDROCHLORIDE 1 MG/ML
INJECTION INTRAMUSCULAR; INTRAVENOUS PRN
Status: DISCONTINUED | OUTPATIENT
Start: 2021-12-12 | End: 2021-12-12 | Stop reason: ALTCHOICE

## 2021-12-12 RX ORDER — FENTANYL CITRATE 50 UG/ML
INJECTION, SOLUTION INTRAMUSCULAR; INTRAVENOUS PRN
Status: DISCONTINUED | OUTPATIENT
Start: 2021-12-12 | End: 2021-12-12 | Stop reason: ALTCHOICE

## 2021-12-12 RX ORDER — MAGNESIUM SULFATE IN WATER 40 MG/ML
2000 INJECTION, SOLUTION INTRAVENOUS PRN
Status: DISCONTINUED | OUTPATIENT
Start: 2021-12-12 | End: 2021-12-21 | Stop reason: HOSPADM

## 2021-12-12 RX ORDER — SODIUM CHLORIDE 0.9 % (FLUSH) 0.9 %
10 SYRINGE (ML) INJECTION PRN
Status: DISCONTINUED | OUTPATIENT
Start: 2021-12-12 | End: 2021-12-21 | Stop reason: HOSPADM

## 2021-12-12 RX ORDER — ACETAMINOPHEN 650 MG/1
650 SUPPOSITORY RECTAL EVERY 6 HOURS PRN
Status: DISCONTINUED | OUTPATIENT
Start: 2021-12-12 | End: 2021-12-21 | Stop reason: HOSPADM

## 2021-12-12 RX ORDER — HEPARIN SODIUM 1000 [USP'U]/ML
60 INJECTION, SOLUTION INTRAVENOUS; SUBCUTANEOUS PRN
Status: DISCONTINUED | OUTPATIENT
Start: 2021-12-12 | End: 2021-12-12

## 2021-12-12 RX ORDER — ACETAMINOPHEN 325 MG/1
650 TABLET ORAL EVERY 6 HOURS PRN
Status: DISCONTINUED | OUTPATIENT
Start: 2021-12-12 | End: 2021-12-21 | Stop reason: HOSPADM

## 2021-12-12 RX ORDER — NICOTINE POLACRILEX 4 MG
15 LOZENGE BUCCAL PRN
Status: DISCONTINUED | OUTPATIENT
Start: 2021-12-12 | End: 2021-12-21 | Stop reason: HOSPADM

## 2021-12-12 RX ORDER — DEXTROSE MONOHYDRATE 50 MG/ML
100 INJECTION, SOLUTION INTRAVENOUS PRN
Status: DISCONTINUED | OUTPATIENT
Start: 2021-12-12 | End: 2021-12-21 | Stop reason: HOSPADM

## 2021-12-12 RX ADMIN — DICYCLOMINE HYDROCHLORIDE 20 MG: 10 CAPSULE ORAL at 13:39

## 2021-12-12 ASSESSMENT — PAIN SCALES - GENERAL
PAINLEVEL_OUTOF10: 0
PAINLEVEL_OUTOF10: 2

## 2021-12-12 ASSESSMENT — PAIN DESCRIPTION - LOCATION
LOCATION: ABDOMEN
LOCATION: ABDOMEN

## 2021-12-12 ASSESSMENT — PAIN DESCRIPTION - ONSET: ONSET: GRADUAL

## 2021-12-12 ASSESSMENT — PAIN DESCRIPTION - PAIN TYPE
TYPE: ACUTE PAIN;CHRONIC PAIN
TYPE: ACUTE PAIN

## 2021-12-12 ASSESSMENT — PAIN DESCRIPTION - ORIENTATION: ORIENTATION: RIGHT

## 2021-12-12 ASSESSMENT — PAIN DESCRIPTION - PROGRESSION: CLINICAL_PROGRESSION: RAPIDLY WORSENING

## 2021-12-12 ASSESSMENT — PAIN DESCRIPTION - FREQUENCY: FREQUENCY: INTERMITTENT

## 2021-12-12 ASSESSMENT — PAIN DESCRIPTION - DESCRIPTORS: DESCRIPTORS: ACHING

## 2021-12-12 NOTE — PROGRESS NOTES
Colonoscopy dictated #[id number], pictures unable be captured due to system malfunction  Rec;  - npo, ivf, surgery definitive correction  Attempted call son with results (first contact on contact sheet) but no answer.

## 2021-12-12 NOTE — ED NOTES
Pt is talking to his family on the phone and updating them that he will be going to surgery.       Saeid Patton RN  12/12/21 [id number]

## 2021-12-12 NOTE — ED TRIAGE NOTES
Pt complains of abdominal pain for 4 days. Pt was seen in ED 2 days ago and states \" they said I had a twisted bowel\". Took advil before calling 911.

## 2021-12-12 NOTE — ED NOTES
Bed: C-30  Expected date: 12/12/21  Expected time:   Means of arrival:   Comments:  Medic 25 66 y.o. Abd.  Pain     Cayetano Wabasso  12/12/21 1240

## 2021-12-12 NOTE — CONSULTS
[address] [facility]  GI Consult Note    Patient: Tianna Russo  : 1945  Acct#:      Date:  2021    Subjective:       History of Present Illness  Patient is a 64 y.o.  male whom we are consulted for sigmoid volvulus. Pt admit 21 with sigmoid volvulus tx with emergent decompressive colonoscopy, had surg eval/repair held off, now returns with abd discomfort and ct with recurrent sigmoid volvulus. No severe pain. On eliquis. Past Medical History:   Diagnosis Date    Cerebral artery occlusion with [facility] infarction [facility] [address]     Diabetes mellitus ([facility] [address]     Hypertension       Past Surgical History:   Procedure Laterality Date    COLONOSCOPY N/A 2021    COLONOSCOPY DIAGNOSTIC performed by Puma Chowdary MD at [facility] ENDOSCOPY        Admission Meds  No current facility-administered medications on file prior to encounter.      Current Outpatient Medications on File Prior to Encounter   Medication Sig Dispense Refill    polyethylene glycol (GLYCOLAX) 17 g packet Take 17 g by mouth daily 527 g 1    cloNIDine (CATAPRES) 0.2 MG/24HR PTWK Place 1 patch onto the skin once a week      docusate sodium (COLACE) 100 MG capsule Take 100 mg by mouth daily      dilTIAZem (DILACOR XR) 180 MG extended release capsule Take 180 mg by mouth daily      potassium chloride (KLOR-CON M) 20 MEQ extended release tablet Take 20 mEq by mouth 2 times daily      ferrous sulfate (FE TABS 325) 325 (65 Fe) MG EC tablet Take 325 mg by mouth 2 times daily      bisacodyl (DULCOLAX) 5 MG EC tablet Take 10 mg by mouth daily as needed for Constipation      allopurinol (ZYLOPRIM) 100 MG tablet Take 100 mg by mouth daily      apixaban (ELIQUIS) 5 MG TABS tablet Take 5 mg by mouth 2 times daily      atorvastatin (LIPITOR) 40 MG tablet Take 40 mg by mouth daily      Cholecalciferol 50 MCG ( UT) TABS Take 50 mcg by mouth daily      omeprazole (PRILOSEC) 20 MG delayed release capsule Take 20 mg by mouth daily  sertraline (ZOLOFT) 25 MG tablet Take 25 mg by mouth daily           Allergies  No Known Allergies     Family history   History reviewed. No pertinent family history. Social   Social History     Tobacco Use    Smoking status: Never Smoker    Smokeless tobacco: Never Used   Substance Use Topics    Alcohol use: Not Currently          Review of Systems    Constitutional: negative  Respiratory: negative  Cardiovascular: negative  Gastrointestinal: as above  Musculoskeletal:negative  Neurological: negative         Physical Exam  Blood pressure (!) 157/58, pulse 70, resp. rate 16, SpO2 100 %. General appearance: alert, cooperative, no distress, appears stated age  Anicteric, No Jaundice  Head: Normocephalic, without obvious abnormality  Lungs: clear to auscultation bilaterally  Heart: regular rate and rhythm  Abdomen: soft, distended but not tense, mild discomfort but no rebound/gaurding  Extremities: no edema  Skin: warm and dry  Neuro: alert and oriented      Data Review:    Recent Labs     12/12/21  1313   WBC 6.5   HGB 10.3*   HCT 32.3*   MCV 91.1        Recent Labs     12/12/21  1313      K 4.5      CO2 19*   BUN 22*   CREATININE 2.0*     Recent Labs     12/12/21  1313   AST 11*   ALT 7*   BILIDIR <0.2   BILITOT 0.4   ALKPHOS 138*     Recent Labs     12/12/21  1313   LIPASE 32.0     No results for input(s): PROTIME, INR in the last 72 hours. No results for input(s): PTT in the last 72 hours. No results for input(s): OCCULTBLD in the last 72 hours. Assessment / Plan :    1.gi: recurrent sigmoid volvulus  Rec;  - hold eliquis  - npo/ivf  - sigmoid decompression  - then surgical repair since recurrent   Discussed with pt/nurse/attending/dr lares.         Angelica Hylton MD , MD  [facility] and [facility]

## 2021-12-12 NOTE — ED PROVIDER NOTES
[address]  [address] [address]  Dept: [phone]  Loc: [address] ENCOUNTER        This patient was not seen or evaluated by the attending physician. I evaluated this patient, the attending physician was available for consultation. CHIEF COMPLAINT    Chief Complaint   Patient presents with    Abdominal Pain       HPI    Regina Swanson is a 64 y.o. male who presents with abdominal pain diffusely throughout the abdomen, but mostly on the right side. Last bowel movement was yesterday. Has been ongoing for approximately 3 to 4 days. Does endorse intermittent mild diarrhea, no nausea or vomiting. The duration has been constant but waxes and wanes in intensity since the onset. The patient denies any hematemesis melena or hematochezia. The pain is to/10 in severity. The quality of the pain is sharp. The context is that the symptoms started spontaneously. There are no alleviating factors. Came to the ED for further evaluation and treatment    REVIEW OF SYSTEMS    GI: see HPI, no vomiting, +intermittent diarrhea, no hematochezia  Cardiac: No chest pain, shortness of breath, palpitations or syncope  Pulmonary: No difficulty breathing or new cough  General: No fevers  : No dysuria, No hematuria  See HPI for further details. All other systems reviewed and are negative.     PAST MEDICAL & SURGICAL HISTORY    Past Medical History:   Diagnosis Date    Cerebral artery occlusion with cerebral infarction ([address]     Diabetes mellitus ([address]     Hypertension      Past Surgical History:   Procedure Laterality Date    COLONOSCOPY N/A 11/13/2021    COLONOSCOPY DIAGNOSTIC performed by Rere Gibbons MD at [address] [facility]  (may include discharge medications prescribed in the ED)  Current Outpatient Rx   Medication Sig Dispense Refill    polyethylene glycol (GLYCOLAX) 17 g packet Take 17 g by mouth daily 527 g 1    cloNIDine (CATAPRES) 0.2 MG/24HR PTWK Place 1 patch onto the skin once a week      docusate sodium (COLACE) 100 MG capsule Take 100 mg by mouth daily      dilTIAZem (DILACOR XR) 180 MG extended release capsule Take 180 mg by mouth daily      potassium chloride (KLOR-CON M) 20 MEQ extended release tablet Take 20 mEq by mouth 2 times daily      ferrous sulfate (FE TABS 325) 325 (65 Fe) MG EC tablet Take 325 mg by mouth 2 times daily      bisacodyl (DULCOLAX) 5 MG EC tablet Take 10 mg by mouth daily as needed for Constipation      allopurinol (ZYLOPRIM) 100 MG tablet Take 100 mg by mouth daily      apixaban (ELIQUIS) 5 MG TABS tablet Take 5 mg by mouth 2 times daily      atorvastatin (LIPITOR) 40 MG tablet Take 40 mg by mouth daily      Cholecalciferol 50 MCG (2000 UT) TABS Take 50 mcg by mouth daily      omeprazole (PRILOSEC) 20 MG delayed release capsule Take 20 mg by mouth daily      sertraline (ZOLOFT) 25 MG tablet Take 25 mg by mouth daily         ALLERGIES    No Known Allergies    SOCIAL AND FAMILY HISTORY    Social History     Socioeconomic History    Marital status: Single     Spouse name: None    Number of children: None    Years of education: None    Highest education level: None   Occupational History    None   Tobacco Use    Smoking status: Never Smoker    Smokeless tobacco: Never Used   Substance and Sexual Activity    Alcohol use: Not Currently    Drug use: Never    Sexual activity: None   Other Topics Concern    None   Social History Narrative    None     Social Determinants of Health     Financial Resource Strain:     Difficulty of Paying Living Expenses: Not on file   Food Insecurity:     Worried About Running Out of Food in the Last Year: Not on file    Kyle of Food in the Last Year: Not on file   Transportation Needs:     Lack of Transportation (Medical): Not on file    Lack of Transportation (Non-Medical):  Not on file   Physical Activity:     Days of Exercise per Week: Not on file    Minutes of Exercise per Session: Not on file   Stress:     Feeling of Stress : Not on file   Social Connections:     Frequency of Communication with Friends and Family: Not on file    Frequency of Social Gatherings with Friends and Family: Not on file    Attends [employer] Services: Not on file    Active Member of Clubs or Organizations: Not on file    Attends Club or Organization Meetings: Not on file    Marital Status: Not on file   Intimate Partner Violence:     Fear of Current or Ex-Partner: Not on file    Emotionally Abused: Not on file    Physically Abused: Not on file    Sexually Abused: Not on file   Housing Stability:     Unable to Pay for Housing in the Last Year: Not on file    Number of [place] in the Last Year: Not on file    Unstable Housing in the Last Year: Not on file     History reviewed. No pertinent family history. PHYSICAL EXAM    VITAL SIGNS: BP (!) 157/58   Pulse 70   Resp 16   SpO2 100%   Constitutional:  Well developed, well nourished, no acute distress  Eyes:  Sclera nonicteric, conjunctiva moist  HENT:  Atraumatic, nose normal  Neck: no JVD  Respiratory:  No retractions, no accessory muscle use, normal breath sounds   Cardiovascular:  regular rate, no murmurs  GI:  + Diffuse abdominal tenderness to palpation but worse on the right upper and lower quadrants, soft, no guarding, bowel sounds present, no audible bruits or palpable pulsatile masses  Back: no CVA tenderness  Musculoskeletal:  No edema, no acute deformities  Vascular: DP pulses 2+ and equal bilaterally  Integument: No rashes, skin dry  Neurologic:  Alert & oriented, no slurred speech  Psychiatric: Cooperative, pleasant affect     EKG    Please see the ED Physician note for EKG interpretation.     RADIOLOGY/PROCEDURES    CT ABDOMEN PELVIS WO CONTRAST Additional Contrast? None   Final Result   Recurrent or ongoing sigmoid volvulus with mild surrounding inflammatory fat   stranding at the mildly left site, similar to prior. No pneumatosis, free   fluid, free air, or mesenteric or portal venous gas. Sigmoid diverticulosis, without evidence of acute diverticulitis. Mild prostate gland enlargement. RECOMMENDATIONS:   Unavailable             ED COURSE & MEDICAL DECISION MAKING    Pertinent Labs & Imaging studies reviewed and interpreted. (See chart for details)     See chart for details of medications given during the ED stay. Vitals:    12/12/21 1430 12/12/21 1530 12/12/21 1545 12/12/21 1645   BP: (!) 153/64 (!) 146/63 139/62 (!) 157/58   Pulse:    70   Resp:    16   SpO2:    100%         CRITICAL CARE NOTE:  There was a high probability of clinically significant life-threatening deterioration of the patient's condition requiring my urgent intervention. Total critical care time was at least 45 minutes. This includes vital sign monitoring, pulse oximetry monitoring, telemetry monitoring, clinical response to the IV medications, reviewing the nursing notes, consultation time, dictation/documentation time, and interpretation of the labwork. This excludes any separately billable procedures performed. The critical care time above also includes time spent obtaining history from electronic chart as patient is a poor historian, as the patient was unable to provide the history AND the history obtained was directly relevant to the care of the patient. Differential diagnosis: Abdominal Aortic Aneurysm, Acute Coronary Syndrome, Ischemic Bowel, Bowel Obstruction, PUD, GERD, Acute Cholecystitis, Pancreatitis, Hepatitis, Colitis, Appendicitis, Diverticulitis, Pyelonephritis, UTI, other      Patient is afebrile and nontoxic in appearance. Labs reveal no leukocytosis or significant anemia. Metabolic panel shows baseline creatinine of 2, BUN of 22 and GFR 32, no significant electrolyte derangements.   Lipase within normal limits, alk phos 138, ALT 7, AST 11. No lactic acidosis. Rapid Covid negative. CT findings as above. Patient apparently had this sigmoid volvulus present in early November, was intervened on by gastroenterology on 11/13 with success. Given that intervention likely a reoccurrence of the volvulus     spoke with Waleska Luong who is coming in for an emergent flex sig. I also consulted general surgery, Dr. Roney Hammonds who will follow along as consult as patient will need surgical intervention on an inpatient basis. Therefore requesting hospitalist for admission. I therefore consulted the hospitalist who agreed to admit patient and write orders for admission.     Results for orders placed or performed during the hospital encounter of 12/12/21   COVID-19, Rapid    Specimen: Nasopharyngeal Swab   Result Value Ref Range    SARS-CoV-2, NAAT Not Detected Not Detected   Lipase   Result Value Ref Range    Lipase 32.0 13.0 - 60.0 U/L   Hepatic Function Panel   Result Value Ref Range    Total Protein 7.7 6.4 - 8.2 g/dL    Albumin 3.8 3.4 - 5.0 g/dL    Alkaline Phosphatase 138 (H) 40 - 129 U/L    ALT 7 (L) 10 - 40 U/L    AST 11 (L) 15 - 37 U/L    Total Bilirubin 0.4 0.0 - 1.0 mg/dL    Bilirubin, Direct <0.2 0.0 - 0.3 mg/dL    Bilirubin, Indirect see below 0.0 - 1.0 mg/dL   CBC Auto Differential   Result Value Ref Range    WBC 6.5 4.0 - 11.0 K/uL    RBC 3.55 (L) 4.20 - 5.90 M/uL    Hemoglobin 10.3 (L) 13.5 - 17.5 g/dL    Hematocrit 32.3 (L) 40.5 - 52.5 %    MCV 91.1 80.0 - 100.0 fL    MCH 29.1 26.0 - 34.0 pg    MCHC 32.0 31.0 - 36.0 g/dL    RDW 15.7 (H) 12.4 - 15.4 %    Platelets 776 119 - 152 K/uL    MPV 8.9 5.0 - 10.5 fL    Neutrophils % 65.5 %    Lymphocytes % 22.2 %    Monocytes % 7.0 %    Eosinophils % 4.4 %    Basophils % 0.9 %    Neutrophils Absolute 4.2 1.7 - 7.7 K/uL    Lymphocytes Absolute 1.4 1.0 - 5.1 K/uL    Monocytes Absolute 0.5 0.0 - 1.3 K/uL    Eosinophils Absolute 0.3 0.0 - 0.6 K/uL    Basophils Absolute 0.1 0.0 - 0.2 K/uL   Basic

## 2021-12-13 LAB
ALBUMIN SERPL-MCNC: 3.7 G/DL (ref 3.4–5)
ANION GAP SERPL CALCULATED.3IONS-SCNC: 14 MMOL/L (ref 3–16)
ANION GAP SERPL CALCULATED.3IONS-SCNC: 14 MMOL/L (ref 3–16)
APTT: 71.1 SEC (ref 26.2–38.6)
APTT: 81.2 SEC (ref 26.2–38.6)
BUN BLDV-MCNC: 22 MG/DL (ref 7–20)
BUN BLDV-MCNC: 22 MG/DL (ref 7–20)
CALCIUM SERPL-MCNC: 9.2 MG/DL (ref 8.3–10.6)
CALCIUM SERPL-MCNC: 9.3 MG/DL (ref 8.3–10.6)
CHLORIDE BLD-SCNC: 108 MMOL/L (ref 99–110)
CHLORIDE BLD-SCNC: 110 MMOL/L (ref 99–110)
CHP ED QC CHECK: YES
CHP ED QC CHECK: YES
CO2: 14 MMOL/L (ref 21–32)
CO2: 17 MMOL/L (ref 21–32)
CREAT SERPL-MCNC: 1.7 MG/DL (ref 0.8–1.3)
CREAT SERPL-MCNC: 1.8 MG/DL (ref 0.8–1.3)
ESTIMATED AVERAGE GLUCOSE: 128.4 MG/DL
GFR AFRICAN AMERICAN: 44
GFR AFRICAN AMERICAN: 47
GFR NON-AFRICAN AMERICAN: 37
GFR NON-AFRICAN AMERICAN: 39
GLUCOSE BLD-MCNC: 78 MG/DL (ref 70–99)
GLUCOSE BLD-MCNC: 79 MG/DL (ref 70–99)
GLUCOSE BLD-MCNC: 80 MG/DL
GLUCOSE BLD-MCNC: 80 MG/DL (ref 70–99)
GLUCOSE BLD-MCNC: 86 MG/DL
GLUCOSE BLD-MCNC: 86 MG/DL (ref 70–99)
GLUCOSE BLD-MCNC: 86 MG/DL (ref 70–99)
HBA1C MFR BLD: 6.1 %
HCT VFR BLD CALC: 30.9 % (ref 40.5–52.5)
HEMOGLOBIN: 9.5 G/DL (ref 13.5–17.5)
MCH RBC QN AUTO: 29.2 PG (ref 26–34)
MCHC RBC AUTO-ENTMCNC: 30.8 G/DL (ref 31–36)
MCV RBC AUTO: 94.7 FL (ref 80–100)
PDW BLD-RTO: 16.4 % (ref 12.4–15.4)
PERFORMED ON: NORMAL
PHOSPHORUS: 3.5 MG/DL (ref 2.5–4.9)
PLATELET # BLD: 145 K/UL (ref 135–450)
PMV BLD AUTO: 10.1 FL (ref 5–10.5)
POTASSIUM REFLEX MAGNESIUM: 3.9 MMOL/L (ref 3.5–5.1)
POTASSIUM SERPL-SCNC: 4 MMOL/L (ref 3.5–5.1)
RBC # BLD: 3.26 M/UL (ref 4.2–5.9)
REASON FOR REJECTION: NORMAL
REJECTED TEST: NORMAL
SODIUM BLD-SCNC: 138 MMOL/L (ref 136–145)
SODIUM BLD-SCNC: 139 MMOL/L (ref 136–145)
WBC # BLD: 6.2 K/UL (ref 4–11)

## 2021-12-13 PROCEDURE — APPSS180 APP SPLIT SHARED TIME > 60 MINUTES: Performed by: PHYSICIAN ASSISTANT

## 2021-12-13 PROCEDURE — 99222 1ST HOSP IP/OBS MODERATE 55: CPT | Performed by: SURGERY

## 2021-12-13 PROCEDURE — 80069 RENAL FUNCTION PANEL: CPT

## 2021-12-13 PROCEDURE — C9113 INJ PANTOPRAZOLE SODIUM, VIA: HCPCS | Performed by: HOSPITALIST

## 2021-12-13 PROCEDURE — 6360000002 HC RX W HCPCS: Performed by: HOSPITALIST

## 2021-12-13 PROCEDURE — 85027 COMPLETE CBC AUTOMATED: CPT

## 2021-12-13 PROCEDURE — 2500000003 HC RX 250 WO HCPCS: Performed by: INTERNAL MEDICINE

## 2021-12-13 PROCEDURE — 6360000002 HC RX W HCPCS: Performed by: INTERNAL MEDICINE

## 2021-12-13 PROCEDURE — 97166 OT EVAL MOD COMPLEX 45 MIN: CPT

## 2021-12-13 PROCEDURE — 2060000000 HC ICU INTERMEDIATE R&B

## 2021-12-13 PROCEDURE — 6370000000 HC RX 637 (ALT 250 FOR IP): Performed by: HOSPITALIST

## 2021-12-13 PROCEDURE — 36415 COLL VENOUS BLD VENIPUNCTURE: CPT

## 2021-12-13 PROCEDURE — 83036 HEMOGLOBIN GLYCOSYLATED A1C: CPT

## 2021-12-13 PROCEDURE — 97530 THERAPEUTIC ACTIVITIES: CPT

## 2021-12-13 PROCEDURE — 97162 PT EVAL MOD COMPLEX 30 MIN: CPT

## 2021-12-13 PROCEDURE — 85730 THROMBOPLASTIN TIME PARTIAL: CPT

## 2021-12-13 PROCEDURE — 2580000003 HC RX 258: Performed by: HOSPITALIST

## 2021-12-13 PROCEDURE — APPNB180 APP NON BILLABLE TIME > 60 MINS: Performed by: PHYSICIAN ASSISTANT

## 2021-12-13 PROCEDURE — 97535 SELF CARE MNGMENT TRAINING: CPT

## 2021-12-13 PROCEDURE — 2580000003 HC RX 258: Performed by: INTERNAL MEDICINE

## 2021-12-13 RX ORDER — SODIUM CHLORIDE 9 MG/ML
10 INJECTION INTRAVENOUS DAILY
Status: DISCONTINUED | OUTPATIENT
Start: 2021-12-13 | End: 2021-12-21

## 2021-12-13 RX ORDER — CLONIDINE 0.2 MG/24H
1 PATCH, EXTENDED RELEASE TRANSDERMAL WEEKLY
Status: DISCONTINUED | OUTPATIENT
Start: 2021-12-13 | End: 2021-12-21 | Stop reason: HOSPADM

## 2021-12-13 RX ORDER — PANTOPRAZOLE SODIUM 40 MG/10ML
40 INJECTION, POWDER, LYOPHILIZED, FOR SOLUTION INTRAVENOUS DAILY
Status: DISCONTINUED | OUTPATIENT
Start: 2021-12-13 | End: 2021-12-21

## 2021-12-13 RX ADMIN — Medication 10 ML: at 18:33

## 2021-12-13 RX ADMIN — SODIUM CHLORIDE, PRESERVATIVE FREE 10 ML: 5 INJECTION INTRAVENOUS at 00:34

## 2021-12-13 RX ADMIN — Medication 1000 UNITS/HR: at 00:34

## 2021-12-13 RX ADMIN — PANTOPRAZOLE SODIUM 40 MG: 40 INJECTION, POWDER, FOR SOLUTION INTRAVENOUS at 18:34

## 2021-12-13 RX ADMIN — LABETALOL HYDROCHLORIDE 10 MG: 5 INJECTION INTRAVENOUS at 21:40

## 2021-12-13 RX ADMIN — HEPARIN SODIUM 4000 UNITS: 1000 INJECTION INTRAVENOUS; SUBCUTANEOUS at 00:29

## 2021-12-13 ASSESSMENT — PAIN SCALES - GENERAL
PAINLEVEL_OUTOF10: 0
PAINLEVEL_OUTOF10: 0

## 2021-12-13 NOTE — CONSULTS
[address]                                  CONSULTATION    PATIENT NAME: Varsha Moreno                     :        1945  MED REC NO:   [id number]                          ROOM:       030  ACCOUNT NO:   [de-identified]                           ADMIT DATE: 2021  PROVIDER:     Igor Rdz MD    CONSULT DATE:  2021    INTRODUCTION:  This is a 25-year-old patient who had a history of  sigmoid volvulus one month ago. He had a colonic decompression. Surgical repair was deferred. He presents one month later to the  emergency room with recurrent sigmoid volvulus. We were consulted to  perform sigmoid decompression. The risk, benefits and alternatives  including risk of bleeding, infection, sedation and perforation were  discussed. Informed consent was obtained for the patient. Pulse  oximetry and blood pressure monitored throughout the procedure. The  patient was sedated with 2 mg of Versed and 50 mcg of fentanyl for the  procedure. PROCEDURE:  Digital rectal exam revealed no mass lesion. Colonoscope  was passed without difficulty and direct visualization to the proximal  transverse colon. Upon passing the scope, the sigmoid volvulus twist  was noted at 20 cm from the anal verge. The scope was able to be gently  twisted through this area. There was no mucosal irritation with passage  of the scope. The sigmoid colon and transverse colon proximally to this  was extensively dilated. The mucosa was actually decently clean in the  sigmoid and transverse colon. Upon reaching the right colon, there was  solid stool. The scope was not advanced further. All of the excessive  air was suctioned from the transverse and sigmoid colon. We did not  attempt reverse torsion of the sigmoid area as he is on Eliquis and this  is his second episode and he will need definitive surgical repair. Anyway, the abdominal exam after suctioning was completely flat, benign  and soft. The scope was withdrawn to the rectum. Any remnant air from  the rectum was suctioned as well. There was some semi-solid stool and  liquid stool in the rectum. Retroflexed view of the rectum was limited  by stool but did not show any mass lesions with the limits of the prep. The scope was straightened and removed. The patient tolerated the  procedure well with no immediate complications. IMPRESSION:  1. Sigmoid volvulus at 20 cm from the anal verge, now status post  decompression of the transverse and descending colon and proximal  sigmoid, proximal to 20 cm.  2.  Solid stool on the right colon so the scope was not advanced into  the right colon. 3.  Limited views with the retroflexed view of the rectum due to  retained stool. RECOMMENDATIONS:  1. Hold Eliquis. 2.  N.p.o.  3.  Surgical repair as this is the second volvulus for the past month. I did speak with Dr. Marisela Cuevas who was consulting for surgery. The estimated blood loss was 0.         Porfirio Jeffery MD    D: 12/12/2021 17:36:52       T: 12/12/2021 20:56:28     [email or web address]  Job#: [id number]     Doc#: [id number]    CC:

## 2021-12-13 NOTE — PROGRESS NOTES
diagnosis of Generalized abdominal pain was also pertinent to this visit. has a past medical history of Cerebral artery occlusion with cerebral infarction ([facility] [facility] Diabetes mellitus ([facility] and Hypertension. has a past surgical history that includes Colonoscopy (N/A, 11/13/2021) and Colonoscopy (N/A, 12/12/2021). Restrictions  Restrictions/Precautions  Restrictions/Precautions: Fall Risk  Position Activity Restriction  Other position/activity restrictions: H/o CVA w/ right side weakness     Vision/Hearing  Vision: Within Functional Limits  Hearing: Exceptions to [facility]  Hearing Exceptions: Hard of hearing/hearing concerns       Subjective  General  Chart Reviewed: Yes  Patient assessed for rehabilitation services?: Yes (Simultaneous filing. User may not have seen previous data.)  Additional Pertinent Hx: Patient is a 61-year-old male with past medical history of diabetes mellitus who presented to hospital on 12/12/21 for abdominal pain and diarrhea. Pt underwent sigmoid volvulus decompression on 12/12/21. Response To Previous Treatment: Not applicable  Family / Caregiver Present: No  Referring Practitioner: Ana M Daniels MD  Referral Date : 12/12/21  Diagnosis: Sigmoid volvulus, generalized abdominal pain  Follows Commands: Within Functional Limits  Subjective  Subjective: Pt is agreeable to PT.           Orientation  Orientation  Overall Orientation Status: Within Functional Limits     Social/Functional History  Social/Functional History  Lives With: Alone  Type of Home: House  Home Layout: Two level, Able to Live on Main level with bedroom/bathroom  Home Access: Ramped entrance  Bathroom Shower/Tub: Walk-in shower, Shower chair with back  Bathroom Toilet: Handicap height  Bathroom Equipment: Grab bars in shower, Grab bars around toilet  Home Equipment: Wheelchair-electric  ADL Assistance: Needs assistance (Aide assists with bathing/dressing)  Homemaking Assistance: Needs assistance (Aide completes)  Ambulation Assistance:  (Uses w/c)  Transfer Assistance: Independent  Active : No  Patient's  Info: Aide assists (?)  Additional Comments: Sleeps in a hospital bed. Questionable historian. Cognition   Cognition  Overall Cognitive Status: Exceptions  Arousal/Alertness: Appropriate responses to stimuli  Following Commands: Follows one step commands consistently  Attention Span: Appears intact  Safety Judgement: Decreased awareness of need for safety; Decreased awareness of need for assistance  Problem Solving: Decreased awareness of errors  Insights: Decreased awareness of deficits  Initiation: Requires cues for some  Sequencing: Requires cues for some  Cognition Comment: May be questionable historian    Objective  Strength RLE  Strength RLE: Exception  Comment: severe generalized weakness - likely secondary to previous CVA. Strength LLE  Strength LLE: Exception  Comment: mild to moderate generalized weakness  Tone RLE  RLE Tone: Hypertonic     Bed mobility  Bridging: Contact guard assistance  Rolling to Left: Maximum assistance  Rolling to Right: Maximum assistance  Comment: Pt found to be saturated in urine - multiple rolls performed for linen/brief change and dependent pericare. Transfers  Sit to Stand: Unable to assess  Stand to sit: Unable to assess  Ambulation  Ambulation?: No              Plan   Plan  Times per week: 3-5x/week  Current Treatment Recommendations: Strengthening, Functional Mobility Training, Transfer Training, Balance Training, Endurance Training, Patient/Caregiver Education & Training, Safety Education & Training, Equipment Evaluation, Education, & procurement, Neuromuscular Re-education  Safety Devices  Type of devices:  All fall risk precautions in place, Call light within reach, Gait belt, Patient at risk for falls, Left in bed, Nurse notified    AM-PAC Score  AM-PAC Inpatient Mobility Raw Score : 7 (12/13/21 1146)  AM-PAC Inpatient T-Scale Score : 26.42 (12/13/21 1146)  Mobility Inpatient CMS 0-100% Score: 92.36 (12/13/21 1146)  Mobility Inpatient CMS G-Code Modifier : CM (12/13/21 1146)          Goals  Short term goals  Time Frame for Short term goals: by acute discharge  Short term goal 1: bed mobiltiy with min A  Short term goal 2: stand pivot vs squat pviot with min A  Patient Goals   Patient goals : none stated       Therapy Time   Individual Concurrent Group Co-treatment   Time In 1045         Time Out 1110         Minutes 25         Timed Code Treatment Minutes: 10 Minutes       Jonatna Hernandez, PT

## 2021-12-13 NOTE — ED NOTES
Pt's son is at the bedside. Pt is awake but remains drowsy.  Denies any needs at this time       Rashmi Quiles RN  12/12/21 2009

## 2021-12-13 NOTE — H&P
Hospital Medicine History & Physical      PCP: Ramesh AdileneOBI - CNP    Date of Admission: 12/12/2021    Chief Complaint:  Abdominal pain    History Of Present Illness:  Patient is a 26-year-old male with past medical history of diabetes mellitus who presented to hospital for abdominal pain. According to patient he has generalized abdominal pain, has been diarrhea for 3 to 4 days it has been getting worse, he has associated loose bowels but no nausea or vomiting, he has not noticed if he is passing gas. He mentions his abdominal pain is 10/10 intensity, nonradiating, no aggravating or alleviating factors. Otherwise denied fevers chills chest pain shortness of breath      Past Medical History:          Diagnosis Date    [facility]bral [facility] occlusion with [facility] infarction ([facility]     Diabetes mellitus ([facility]     Hypertension        Past Surgical History:          Procedure Laterality Date    COLONOSCOPY N/A 11/13/2021    COLONOSCOPY DIAGNOSTIC performed by Pito Lange MD [facility]       Medications Prior to Admission:      Prior to Admission medications    Medication Sig Start Date End Date Taking?  Authorizing Provider   polyethylene glycol (GLYCOLAX) 17 g packet Take 17 g by mouth daily 11/18/21 12/18/21  Cristiane Gamez MD   cloNIDine (CATAPRES) 0.2 MG/24HR PTWK Place 1 patch onto the skin once a week    Historical Provider, MD   docusate sodium (COLACE) 100 MG capsule Take 100 mg by mouth daily    Historical Provider, MD   dilTIAZem (DILACOR XR) 180 MG extended release capsule Take 180 mg by mouth daily    Historical Provider, MD   potassium chloride (KLOR-CON M) 20 MEQ extended release tablet Take 20 mEq by mouth 2 times daily    Historical Provider, MD   ferrous sulfate (FE TABS 325) 325 (65 Fe) MG EC tablet Take 325 mg by mouth 2 times daily    Historical Provider, MD   bisacodyl (DULCOLAX) 5 MG EC tablet Take 10 mg by mouth daily as needed for Constipation    Historical Provider, MD   allopurinol (ZYLOPRIM) 100 MG tablet Take 100 mg by mouth daily    Historical Provider, MD   apixaban (ELIQUIS) 5 MG TABS tablet Take 5 mg by mouth 2 times daily    Historical Provider, MD   atorvastatin (LIPITOR) 40 MG tablet Take 40 mg by mouth daily    Historical Provider, MD   Cholecalciferol 50 MCG (2000 UT) TABS Take 50 mcg by mouth daily    Historical Provider, MD   omeprazole (PRILOSEC) 20 MG delayed release capsule Take 20 mg by mouth daily    Historical Provider, MD   sertraline (ZOLOFT) 25 MG tablet Take 25 mg by mouth daily    Historical Provider, MD       Allergies:  Patient has no known allergies. Social History:      TOBACCO:   reports that he has never smoked. He has never used smokeless tobacco.  ETOH:   reports previous alcohol use. Family History:       Reviewed in detail and non contributory      History reviewed. No pertinent family history. REVIEW OF SYSTEMS:   Pertinent positives as noted in the HPI. All other systems reviewed and negative. PHYSICAL EXAM PERFORMED:    BP (!) 164/64   Pulse 50   Temp 98 °F (36.7 °C) (Oral)   Resp 16   SpO2 100%     General appearance:  No apparent distress, cooperative. HEENT:  Normal cephalic, atraumatic without obvious deformity. Conjunctivae/corneas clear. Neck: Supple, with full range of motion. No cervical lymphadenopathy  Respiratory:  Normal respiratory effort. Clear to auscultation, bilaterally without Rales/Wheezes/Rhonchi. Cardiovascular:  Regular rate and rhythm with normal S1/S2 without murmurs, rubs or gallops. Abdomen: +ve tender, non-distended, normal bowel sounds. Musculoskeletal:  No edema noted bilaterally. No tenderness on palpation   Skin: no rash visible  Neurologic:  Neurologically intact without any focal sensory/motor deficits.   grossly non-focal.  Psychiatric:  Alert and oriented, normal mood  Peripheral Pulses: +2 palpable, equal bilaterally       Labs:     Recent Labs     12/12/21  1313   WBC 6.5 HGB 10.3*   HCT 32.3*        Recent Labs     12/12/21  1313      K 4.5      CO2 19*   BUN 22*   CREATININE 2.0*   CALCIUM 9.4     Recent Labs     12/12/21  1313   AST 11*   ALT 7*   BILIDIR <0.2   BILITOT 0.4   ALKPHOS 138*     No results for input(s): INR in the last 72 hours. No results for input(s): Lubna Parisian in the last 72 hours. Urinalysis:      Lab Results   Component Value Date    NITRU Negative 12/12/2021    WBCUA 7 12/12/2021    RBCUA 10 12/12/2021    BLOODU SMALL 12/12/2021    SPECGRAV 1.010 12/12/2021    GLUCOSEU Negative 12/12/2021       Radiology:       CT ABDOMEN PELVIS WO CONTRAST Additional Contrast? None   Final Result   Recurrent or ongoing sigmoid volvulus with mild surrounding inflammatory fat   stranding at the mildly left site, similar to prior. No pneumatosis, free   fluid, free air, or mesenteric or portal venous gas. Sigmoid diverticulosis, without evidence of acute diverticulitis. Mild prostate gland enlargement. RECOMMENDATIONS:   Unavailable                 Active Hospital Problems    Diagnosis Date Noted    Abdominal pain [R10.9] 12/12/2021     Patient is a 20-year-old male with past medical history of diabetes mellitus who presented to hospital for abdominal pain. According to patient he has generalized abdominal pain, has been diarrhea for 3 to 4 days it has been getting worse, he has associated loose bowels but no nausea or vomiting, he has not noticed if he is passing gas. He mentions his abdominal pain is 10/10 intensity, nonradiating, no aggravating or alleviating factors.   Otherwise denied fevers chills chest pain shortness of breath    Assessment  Sigmoid volvulus, generalized abdominal pain  CKD stage III, creatinine around baseline  Diabetes mellitus  afib on eliquis    Plan  GI consulted from ED, plan for emergent colonoscopic decompression, general surgery also consulted, n.p.o., pain control, IV fluid therapy normal saline  Insulin sliding scale  DVT prophylaxis-patient takes Eliquis at home which is on hold in anticipation of any surgical needs, bridge with IV heparin  Diet: Diet NPO  Code Status: Prior    PT/OT Eval Status: ordered    Dispo - pending clinical improvement       Torito Glasgow MD    The note was completed using EMR and Dragon dictation system. Every effort was made to ensure accuracy; however, inadvertent computerized transcription errors may be present. Thank you OBI Shore CNP for the opportunity to be involved in this patient's care. If you have any questions or concerns please feel free to contact me at [phone].     Torito Glasgow MD

## 2021-12-13 NOTE — ED NOTES
Still waiting for Heparin gtt from pharmacy. PCU nurse in ED and will take over pt care. Report to him.      Ashley Guo, RN  12/12/21 2005 BOB Albrecht RN  12/12/21 [id number]

## 2021-12-13 NOTE — ED NOTES
Writer received call from pharmacy that pts PTT is greater than 248. Pt was on continuous heparin drip started at [facility] this am. [facility] Sender stopped heparin drip at this time to wait for new result and message sent to hospitalist by [facility]. Per [facility] RN who had pt prior to 11am, said with 0800 am ptt result of 81.2 pharmacy said that we should continue heparin drip at current rate. Writer to redraw ptt at this time.       Beck Gutierrez, RN  12/13/21 710 [address], RN  12/13/21 Lily Ryan, RN  12/13/21 [id number]

## 2021-12-13 NOTE — PROGRESS NOTES
INPATIENT PROGRESS NOTE        IDENTIFYING DATA/REASON FOR CONSULTATION   PATIENT:  Derek Lara  MRN:  [id number]  ADMIT DATE: 2021  TIME OF EVALUATION: 2021 1:24 PM  HOSPITAL STAY:   LOS: 1 day   CONSULTING PHYSICIAN: Vanda Yepez MD   REASON FOR CONSULTATION: sigmoid volvulus     Subjective:    Patient seen in follow up for sigmoid volvulus. Underwent flex sig  with decompression. Pt denies abdominal pain this morning. Abdomen soft. He has not passed flatus     MEDICATIONS   SCHEDULED:  sodium chloride flush, 10 mL, 2 times per day  insulin lispro, 0-12 Units, 6 times per day      FLUIDS/DRIPS:     sodium chloride      sodium chloride Stopped (21)    heparin (PORCINE) Infusion 1,000 Units/hr (21)    dextrose       PRNs: sodium chloride flush, 10 mL, PRN  sodium chloride, 25 mL, PRN  potassium chloride, 40 mEq, PRN   Or  potassium alternative oral replacement, 40 mEq, PRN   Or  potassium chloride, 10 mEq, PRN  potassium chloride, 10 mEq, PRN  magnesium sulfate, 2,000 mg, PRN  promethazine, 12.5 mg, Q6H PRN   Or  ondansetron, 4 mg, Q6H PRN  magnesium hydroxide, 30 mL, Daily PRN  acetaminophen, 650 mg, Q6H PRN   Or  acetaminophen, 650 mg, Q6H PRN  morphine, 4 mg, Q4H PRN  labetalol, 10 mg, Q4H PRN  glucose, 15 g, PRN  dextrose, 12.5 g, PRN  glucagon (rDNA), 1 mg, PRN  dextrose, 100 mL/hr, PRN      ALLERGIES:  No Known Allergies      PHYSICAL EXAM   VITALS:  /62   Pulse 58   Temp 98 °F (36.7 °C) (Oral)   Resp 16   Wt 213 lb (96.6 kg)   SpO2 100%   BMI 31.45 kg/m²   TEMPERATURE:  Current - Temp: 98 °F (36.7 °C);  Max - Temp  Av °F (36.7 °C)  Min: 98 °F (36.7 °C)  Max: 98 °F (36.7 °C)    Physical Exam:  General appearance: alert, cooperative, no distress, appears stated age  Eyes: Anicteric  Head: Normocephalic, without obvious abnormality  Lungs: clear to auscultation bilaterally, Normal Effort  Heart: regular rate and rhythm, normal S1 and S2, no murmurs or rubs  Abdomen: soft, non-distended, non-tender. Bowel sounds normal.   Extremities: atraumatic, no cyanosis or edema  Skin: warm and dry, no jaundice  Neuro: Grossly intact, A&OX3    LABS AND IMAGING   Laboratory   Recent Labs     12/12/21  1313 12/13/21  0751   WBC 6.5 6.2   HGB 10.3* 9.5*   HCT 32.3* 30.9*   MCV 91.1 94.7    145     Recent Labs     12/12/21  1313 12/13/21  0751 12/13/21  1222    138 139   K 4.5 3.9 4.0    110 108   CO2 19* 14* 17*   PHOS  --   --  3.5   BUN 22* 22* 22*   CREATININE 2.0* 1.7* 1.8*     Recent Labs     12/12/21  1313   AST 11*   ALT 7*   BILIDIR <0.2   BILITOT 0.4   ALKPHOS 138*     Recent Labs     12/12/21  1313   LIPASE 32.0     No results for input(s): PROTIME, INR in the last 72 hours. Imaging  CT ABDOMEN PELVIS WO CONTRAST Additional Contrast? None   Final Result   Recurrent or ongoing sigmoid volvulus with mild surrounding inflammatory fat   stranding at the mildly left site, similar to prior. No pneumatosis, free   fluid, free air, or mesenteric or portal venous gas. Sigmoid diverticulosis, without evidence of acute diverticulitis. Mild prostate gland enlargement. RECOMMENDATIONS:   Unavailable             Endoscopy  Colonoscopy 12/12/21 with Dr. Kavya Disla  1. Sigmoid volvulus at 20 cm from the anal verge, now status post  decompression of the transverse and descending colon and proximal  sigmoid, proximal to 20 cm.  2.  Solid stool on the right colon so the scope was not advanced into  the right colon. 3.  Limited views with the retroflexed view of the rectum due to  retained stool. ASSESSMENT AND RECOMMENDATIONS   Garcia Shannon is a 64 y.o. male with PMH of CVA, DM, HTN who presented 12/12/21 with:    1. Recurrent sigmoid volvulus  -s/p colonoscopy with decompression 12/12/21.    -abd soft, nd this am    -gen surgery consulted.   He had prior sigmoid volvulus 1 month ago and underwent colonoscopy with decompression 11/13/21. Surgery was deferred at that time. RECOMMENDATIONS:    Monitor stool output and abdominal exam  Follow up on gen surgery's eval and recommendations  Diet per surgery    If you have any questions or need any further information, please feel free to contact us [phone]. Thank you for allowing us to participate in the care of Juan Kidd. The note was completed using Dragon voice recognition transcription. Every effort was made to ensure accuracy; however, inadvertent transcription errors may be present despite my best efforts to edit errors. Lanie MART    Attending physician addendum:    I have personally seen and examined the patient, reviewed the patient's medical record and pertinent labs and clinical imaging. I have personally staffed the case with Lanie MART. I agree with her consultation note, exam findings, assessment and plans  as written above. I have made appropriate modifications and edited her assessment and plan where needed to reflect my impression and plans for this patient. Juan Kidd is a 64 y.o. male with PMH of CVA, DM, HTN who presented with recurrent sigmoid volvulus. Patient presented on 11/13/2021 with abdominal pain and underwent colonoscopy with resolution of sigmoid volvulus. He presents again with recurrent sigmoid volvulus. He underwent a decompressive colonoscopy on 12/12. He is feeling well and denies any abdominal pain/distension. He has not had any BM yet. Recommend surgical resection to prevent future reoccurrence. Advance diet as tolerated. Thank you for allowing me to participate in this patient's care. If there are any questions or concerns regarding this patient, or the plan we have set in place, please feel free to contact me at [phone].      Jesús Morales MD

## 2021-12-13 NOTE — PROGRESS NOTES
Occupational Therapy   Occupational Therapy Initial Assessment  Date: 2021   Patient Name: Anthony Garcia  MRN: [id number]     : 1945    Date of Service: 2021    Discharge Recommendations:  3-5 sessions per week, Patient would benefit from continued therapy after discharge     Anthony Garcia scored a  on the AM-PAC ADL Inpatient form. Current research shows that an AM-PAC score of 17 or less is typically not associated with a discharge to the patient's home setting. Based on the patient's AM-PAC score and their current ADL deficits, it is recommended that the patient have 3-5 sessions per week of Occupational Therapy at d/c to increase the patient's independence. Please see assessment section for further patient specific details. If patient discharges prior to next session this note will serve as a discharge summary. Please see below for the latest assessment towards goals. Assessment   Performance deficits / Impairments: Decreased functional mobility ; Decreased ADL status; Decreased ROM; Decreased strength  Assessment: Pt is a 65 yo M admitted with abdominal pain. Pt has h/o CVA with right sided weakness (~8 yrs prior). PTA, pt reportedly lives at home alone and transfers to power w/c independently (pt vague and unable to provide full picture of PLOF) and receives assist for ADLs and IADLs from [facility] aide. He is below baseline at this time. Pt was incontinent of urine in bed and required total Ax2 for hygiene and LB dressing in bed. He required max A to roll to both sides in bed and was too fatigued to attempt EOB/OOB following. Will continue to see on acute in order to address the above deficits and maximize pt's functional performance. Pt appears unsafe to return home at this level and demonstrates need for ongoing skilled therapy 3-5 times/week at d/c to increase his safety and independence.   Treatment Diagnosis: Decreased: ADLs, fxl transfers, fxl mobility  Prognosis: bed, agreeable to therapy and denied pain  Vital Signs  Pulse: 55  Resp: 16  BP: (!) 139/56  MAP (mmHg): 77  Oxygen Therapy  SpO2: 100 %  Social/Functional History  Social/Functional History  Lives With: Alone  Type of Home: House  Home Layout: Two level, Able to Live on Main level with bedroom/bathroom  Home Access: Ramped entrance  Bathroom Shower/Tub: Walk-in shower, Shower chair with back  Points Toilet: Handicap height  Bathroom Equipment: Grab bars in shower, Grab bars around toilet  Home Equipment: Wheelchair-electric  ADL Assistance: Needs assistance (Aide assists with bathing/dressing)  Homemaking Assistance: Needs assistance (Aide completes)  Ambulation Assistance:  (Uses w/c)  Transfer Assistance: Independent  Active : No  Patient's  Info: Aide assists (?)  Additional Comments: Sleeps in a hospital bed. Questionable historian.        Objective   Vision: Within Functional Limits  Hearing: Exceptions to [facility]  Hearing Exceptions: Hard of hearing/hearing concerns    Orientation  Overall Orientation Status: Within Functional Limits     Balance  Sitting Balance: Unable to assess(comment)  Standing Balance: Unable to assess(comment)  Functional Mobility  Functional Mobility Comments: Pt is non-ambulatory  ADL  LE Dressing: Dependent/Total (to change soiled brief in bed)  Toileting: Dependent/Total (pt incontinent of urine through briefs and sheets, total A for hygiene and changing briefs/linens in bed)  Additional Comments: Anticipate pt would require setup for feeding and grooming, min/mod A for UB bathing/dressing, max A LB bathing based on ROM, strength, endurance this session  Tone RUE  RUE Tone: Hypertonic  Tone LUE  LUE Tone: Normotonic  Coordination  Movements Are Fluid And Coordinated: No  Quality of Movement Other  Comment: Minimal right UE movement, h/o CVA     Bed mobility  Bridging: Contact guard assistance  Rolling to Left: Maximum assistance  Rolling to Right: Maximum assistance  Comment: Pt found to be saturated in urine - multiple rolls performed for linen/brief change and dependent pericare. Transfers  Sit to stand: Unable to assess  Stand to sit: Unable to assess  Transfer Comments: Too fatigued after rolling multiple times in bed for pericare     Cognition  Overall Cognitive Status: Exceptions  Arousal/Alertness: Appropriate responses to stimuli  Following Commands:  Follows one step commands consistently  Attention Span: Appears intact  Safety Judgement: Decreased awareness of need for safety; Decreased awareness of need for assistance  Problem Solving: Decreased awareness of errors  Insights: Decreased awareness of deficits  Initiation: Requires cues for some  Sequencing: Requires cues for some  Cognition Comment: May be questionable historian                 LUE AROM (degrees)  LUE AROM : WFL  Left Hand AROM (degrees)  Left Hand AROM: WFL  RUE AROM (degrees)  RUE General AROM: minimal active movement  Right Hand AROM (degrees)  Right Hand General AROM: wrist/fingers have flexion contractures, h/o CVA  LUE Strength  Gross LUE Strength: WFL  RUE Strength  RUE Strength Comment: 0/5         Plan   Plan  Times per week: 3-5  Times per day: Daily  Current Treatment Recommendations: Strengthening, Functional Mobility Training, Endurance Training, ROM, Balance Training, Safety Education & Training, Self-Care / ADL, Equipment Evaluation, Education, & procurement, Patient/Caregiver Education & Training      AM-PAC Score  AM-[facility] Inpatient Daily Activity Raw Score: 12 (12/13/21 1149)  AM-PAC Inpatient ADL T-Scale Score : 30.6 (12/13/21 1149)  ADL Inpatient CMS 0-100% Score: 66.57 (12/13/21 1149)  ADL Inpatient CMS G-Code Modifier : CL (12/13/21 1149)    Goals  Short term goals  Time Frame for Short term goals: Prior to d/c  Short term goal 1: Pt will complete bed mobility w/ SBA  Short term goal 2: Pt will complete fxl transfers w/ CGA via stand/squat pivot  Short term goal 3: Pt will groom w/ setup  Short term goal 4: Pt will toilet w/ CGA  Long term goals  Time Frame for Long term goals : LTG=STG  Patient Goals   Patient goals : to go home       Therapy Time   Individual Concurrent Group Co-treatment   Time In [address]         Time Out 1110         Minutes [address] [place] [id number]

## 2021-12-13 NOTE — ED NOTES
Writer restarted heparin drip with aPTT 71.1 result.       Jenniffer Whittaker, RN  12/13/21 3285 None

## 2021-12-13 NOTE — PROGRESS NOTES
Clinical Pharmacy Note  Heparin Dosing Consult    Rommel Mnior is a 64 y.o. male ordered heparin per low dose nomogram by Dr. Blanca Blackburn. No results found for: APTT  Lab Results   Component Value Date    HGB 10.3 12/12/2021    HCT 32.3 12/12/2021     12/12/2021       Ht Readings from Last 1 Encounters:   11/15/21 5' 9\" (1.753 m)        Wt Readings from Last 1 Encounters:   12/12/21 213 lb (96.6 kg)        Assessment/Plan:  Initial bolus: 4000 units  Initial infusion rate: 1000 units/hr  Next aPTT: 0700  12/13/21    Pharmacy will continue to monitor adjust heparin based on aPTT results using nomogram below:     LOW DOSE HEPARIN PROTOCOL (ACS/STEMI/A FIB)     Initial Bolus: 60 units/kg Max Bolus: 4,000 units       Initial Rate: 12 units/kg/hr Max Initial Rate: 1,000 units/hr     aPTT < 45   Heparin 60 units/kg bolus Increase infusion by 4 units/kg/hr       (maximum 4,000 units)   aPTT 45-59.9   Heparin 30 units/kg bolus Increase infusion by 2 units/kg/hr       (maximum 2,000 units)   aPTT 60-90   No bolus   No change   aPTT 90.1-97.5 No bolus   Decrease infusion by 1 units/kg/hr   aPTT 97.6-105  No bolus     Decrease infusion by 2 units/kg/hr   aPTT > 105   Hold heparin for 1 hour Decrease infusion by 3 units/kg/hr     Obtain aPTT 6 hours after initial bolus and 6 hours after any dose change until two consecutive therapeutic aPTTs are achieved - then daily.     Jorge Aguayo, [facility]  12/13/2021 1:11 AM

## 2021-12-13 NOTE — PROGRESS NOTES
Hospitalist Progress Note      PCP: Melissa Izaguirre APRN - CNP    Date of Admission: 12/12/2021    Chief Complaint: abdominal pain    Hospital Course: 76m admitted with recurrence of abdominal pain secondary to sigmoid volvulus    Subjective: Patient seen on room air, no distress, denies fever cough, abdominal pain, n/v      Medications:  Reviewed    Infusion Medications    sodium chloride      sodium chloride Stopped (12/13/21 0029)    heparin (PORCINE) Infusion 1,000 Units/hr (12/13/21 0034)    dextrose       Scheduled Medications    sodium chloride flush  10 mL IntraVENous 2 times per day    insulin lispro  0-12 Units SubCUTAneous 6 times per day     PRN Meds: sodium chloride flush, sodium chloride, potassium chloride **OR** potassium alternative oral replacement **OR** potassium chloride, potassium chloride, magnesium sulfate, promethazine **OR** ondansetron, magnesium hydroxide, acetaminophen **OR** acetaminophen, morphine, labetalol, glucose, dextrose, glucagon (rDNA), dextrose    No intake or output data in the 24 hours ending 12/13/21 0260    Physical Exam Performed:    BP (!) 144/84   Pulse 73   Temp 98 °F (36.7 °C) (Oral)   Resp 15   Wt 213 lb (96.6 kg)   SpO2 93%   BMI 31.45 kg/m²     General appearance: No apparent distress, appears stated age and cooperative. HEENT: Pupils equal, round, and reactive to light. Conjunctivae/corneas clear. Neck: Supple, with full range of motion. No jugular venous distention. Trachea midline. Respiratory:  Normal respiratory effort, no use of accessory muscles, no intercostal retractions  Cardiovascular: Regular rate and rhythm   Abdomen: Soft, non-tender, non-distended    Musculoskeletal: No clubbing, cyanosis or edema bilaterally.      Skin: Skin color, texture, turgor normal.     Neurologic:  R sided weakness  Psychiatric: Alert and oriented        Labs:   Recent Labs     12/12/21  1313 12/13/21  0751   WBC 6.5 6.2   HGB 10.3* 9.5*   HCT 32.3* 30.9*    145     Recent Labs     12/12/21  1313 12/13/21  0751 12/13/21  1222    138 139   K 4.5 3.9 4.0    110 108   CO2 19* 14* 17*   BUN 22* 22* 22*   CREATININE 2.0* 1.7* 1.8*   CALCIUM 9.4 9.3 9.2   PHOS  --   --  3.5     Recent Labs     12/12/21  1313   AST 11*   ALT 7*   BILIDIR <0.2   BILITOT 0.4   ALKPHOS 138*     No results for input(s): INR in the last 72 hours. No results for input(s): Joy Ill in the last 72 hours. Urinalysis:      Lab Results   Component Value Date    NITRU Negative 12/12/2021    WBCUA 7 12/12/2021    RBCUA 10 12/12/2021    BLOODU SMALL 12/12/2021    SPECGRAV 1.010 12/12/2021    GLUCOSEU Negative 12/12/2021       Radiology:  CT ABDOMEN PELVIS WO CONTRAST Additional Contrast? None   Final Result   Recurrent or ongoing sigmoid volvulus with mild surrounding inflammatory fat   stranding at the mildly left site, similar to prior. No pneumatosis, free   fluid, free air, or mesenteric or portal venous gas. Sigmoid diverticulosis, without evidence of acute diverticulitis. Mild prostate gland enlargement.       RECOMMENDATIONS:   Unavailable                 Assessment/Plan:    Active Hospital Problems    Diagnosis Date Noted    Abdominal pain [R10.9] 12/12/2021     Recurrent Volvulus  - OR Wesndesday    Afib  - holding eliquis, he gtts perioperatively    HTN  - continue catapres patch    GERD   continue PPI         Diet: Diet NPO  Code Status: Full Code         Olegario Adair MD

## 2021-12-13 NOTE — PROGRESS NOTES
Clinical Pharmacy Note  Heparin Dosing       Lab Results   Component Value Date    APTT 71.1 12/13/2021     Lab Results   Component Value Date    HGB 9.5 12/13/2021    HCT 30.9 12/13/2021     12/13/2021       Current Infusion Rate: 1000 units/hr    Plan:  Bolus: 000 units  Rate: 1000 units/hr  Next aPTT: 0600    Pharmacy will continue to monitor and adjust based on aPTT results.

## 2021-12-13 NOTE — CONSULTS
Surgery Consult Note     Otis Dupree PA-C  Pt Name: Julius Aragon  MRN: [id number]  YOB: 1945  Date of evaluation: 12/13/2021  Primary Care Physician: OBI Mckeon - [facility]  IMPRESSIONS:   1. Recurrent sigmoid volvulus  2. WBC WNL  3. CKD  4. A. Fib on Eliquis  PLANS:   1. GI consulted for colonoscopic decompression  2. NPO  3. IVF  4. Hold Eliquis. Being bridged with heparin  SUBJECTIVE:   History of Chief Complaint:    Julius Aragon is a 64 y.o. male who presents with abdominal pain. He stated that this pain began 4 days ago and it is located mainly in the epigastric area, but also has some periumbilical pain. He also admits to having associated diarrhea. This patient was seen by our group last month and treat for a sigmoid volvulus. He had an endoscopy on 11/13/21 that resolved the volvulus. A CT scan was performed yesterday and that showed him to have recurrent sigmoid volvulus with mild surrounding stranding. No pneumatosis, free fluid, free air, or mesenteric or portal venus gas noted. At this itme he stated that he doesn't have any abdominal pain. Denies having any other pain. Denies any CP ,SOB, cough, lightheadedness or dizziness. Past Medical History  Reviewed  has a past medical history of Cerebral artery occlusion with cerebral infarction ([facility] [address] Diabetes mellitus ([facility] [address] and Hypertension. Past Surgical History  Reviewed has a past surgical history that includes Colonoscopy (N/A, 11/13/2021) and Colonoscopy (N/A, 12/12/2021). Medications  Prior to Admission medications    Medication Sig Start Date End Date Taking?  Authorizing Provider   polyethylene glycol (GLYCOLAX) 17 g packet Take 17 g by mouth daily 11/18/21 12/18/21  Nickolas Quintana MD   cloNIDine (CATAPRES) 0.2 MG/24HR PTWK Place 1 patch onto the skin once a week    Historical Provider, MD   docusate sodium (COLACE) 100 MG capsule Take 100 mg by mouth daily    Historical Provider, MD   dilTIAZem (DILACOR XR) 180 MG extended release capsule Take 180 mg by mouth daily    Historical Provider, MD   potassium chloride (KLOR-CON M) 20 MEQ extended release tablet Take 20 mEq by mouth 2 times daily    Historical Provider, MD   ferrous sulfate (FE TABS 325) 325 (65 Fe) MG EC tablet Take 325 mg by mouth 2 times daily    Historical Provider, MD   bisacodyl (DULCOLAX) 5 MG EC tablet Take 10 mg by mouth daily as needed for Constipation    Historical Provider, MD   allopurinol (ZYLOPRIM) 100 MG tablet Take 100 mg by mouth daily    Historical Provider, MD   apixaban (ELIQUIS) 5 MG TABS tablet Take 5 mg by mouth 2 times daily    Historical Provider, MD   atorvastatin (LIPITOR) 40 MG tablet Take 40 mg by mouth daily    Historical Provider, MD   Cholecalciferol 50 MCG (2000 UT) TABS Take 50 mcg by mouth daily    Historical Provider, MD   omeprazole (PRILOSEC) 20 MG delayed release capsule Take 20 mg by mouth daily    Historical Provider, MD   sertraline (ZOLOFT) 25 MG tablet Take 25 mg by mouth daily    Historical Provider, MD    Scheduled Meds:   sodium chloride flush  10 mL IntraVENous 2 times per day    insulin lispro  0-12 Units SubCUTAneous 6 times per day     Continuous Infusions:   sodium chloride      sodium chloride Stopped (12/13/21 0029)    heparin (PORCINE) Infusion 1,000 Units/hr (12/13/21 0034)    dextrose       PRN Meds:.sodium chloride flush, sodium chloride, potassium chloride **OR** potassium alternative oral replacement **OR** potassium chloride, potassium chloride, magnesium sulfate, promethazine **OR** ondansetron, magnesium hydroxide, acetaminophen **OR** acetaminophen, morphine, labetalol, glucose, dextrose, glucagon (rDNA), dextrose  Allergies  has No Known Allergies. Family History  Reviewedfamily history is not on file. Social History   reports that he has never smoked. He has never used smokeless tobacco. He reports previous alcohol use. He reports that he does not use drugs.   EDUCATION  Patient educated about their illness/diagnosis, stated above, and all questions answered. We discussed the importance of nutrition, medications they are taking, and healthy lifestyle. Review of Systems:  General Denies any fever or chills  HEENT Denies any diplopia, tinnitus or vertigo  Resp Denies any shortness of breath, cough or wheezing  Cardiac Denies any chest pain, palpitations, claudication or edema  GI Denies any melena, hematochezia, hematemesis or pyrosis   Denies any frequency, urgency, hesitancy or incontinence  Heme Denies bruising or bleeding easily  Neuro Denies any focal motor or sensory deficits  OBJECTIVE:   VITALS:  weight is 213 lb (96.6 kg). His oral temperature is 98 °F (36.7 °C). His blood pressure is 139/56 (abnormal) and his pulse is 55. His respiration is 16 and oxygen saturation is 100%. CONSTITUTIONAL: Alert and oriented times 3, no acute distress and cooperative to examination with proper mood and affect. SKIN: Skin color, texture, turgor normal. No rashes or lesions. LYMPH: no cervical nodes, no inguinal nodes  HEENT: Head is normocephalic, atraumatic. EOMI, PERRLA. NECK: Supple, symmetrical, trachea midline, no adenopathy, thyroid symmetric, not enlarged and no tenderness, skin normal.  CHEST/LUNGS: chest symmetric with normal A/P diameter, normal respiratory rate and rhythm  CARDIOVASCULAR: bradycardia   ABDOMEN: Normal shape. . Tenderness: Non tender at this itme  RECTAL: deferred, not clinically indicated  NEUROLOGIC: There are no focalizing motor or sensory deficits. CN II-XII are grossly intact. Pinky Angles EXTREMITIES: no cyanosis, no clubbing and no edema.   LABS:     Recent Labs     12/12/21  1313 12/12/21  2050 12/13/21  0751   WBC 6.5  --  6.2   HGB 10.3*  --  9.5*   HCT 32.3*  --  30.9*     --  145     --  138   K 4.5  --  3.9     --  110   CO2 19*  --  14*   BUN 22*  --  22*   CREATININE 2.0*  --  1.7*   CALCIUM 9.4  --  9.3   AST 11*  --   --    ALT 7*  --   --    BILITOT 0.4 recurring issues   Hold Eliquis, on heparin for now   Plan OR Wednesday    Electronically signed by Dillon Naranjo MD on 12/13/2021 at 3:06 PM

## 2021-12-14 ENCOUNTER — ANESTHESIA EVENT (OUTPATIENT)
Dept: OPERATING ROOM | Age: 76
DRG: 330 | End: 2021-12-14
Payer: MEDICARE

## 2021-12-14 LAB
ALBUMIN SERPL-MCNC: 3.4 G/DL (ref 3.4–5)
ANION GAP SERPL CALCULATED.3IONS-SCNC: 17 MMOL/L (ref 3–16)
APTT: 73.8 SEC (ref 26.2–38.6)
BUN BLDV-MCNC: 27 MG/DL (ref 7–20)
CALCIUM SERPL-MCNC: 8.9 MG/DL (ref 8.3–10.6)
CHLORIDE BLD-SCNC: 110 MMOL/L (ref 99–110)
CO2: 15 MMOL/L (ref 21–32)
CREAT SERPL-MCNC: 2.1 MG/DL (ref 0.8–1.3)
GFR AFRICAN AMERICAN: 37
GFR NON-AFRICAN AMERICAN: 31
GLUCOSE BLD-MCNC: 70 MG/DL (ref 70–99)
GLUCOSE BLD-MCNC: 74 MG/DL (ref 70–99)
GLUCOSE BLD-MCNC: 75 MG/DL (ref 70–99)
GLUCOSE BLD-MCNC: 76 MG/DL (ref 70–99)
GLUCOSE BLD-MCNC: 80 MG/DL (ref 70–99)
GLUCOSE BLD-MCNC: 86 MG/DL (ref 70–99)
HCT VFR BLD CALC: 29.8 % (ref 40.5–52.5)
HEMOGLOBIN: 9.7 G/DL (ref 13.5–17.5)
MAGNESIUM: 1.8 MG/DL (ref 1.8–2.4)
MCH RBC QN AUTO: 29.4 PG (ref 26–34)
MCHC RBC AUTO-ENTMCNC: 32.4 G/DL (ref 31–36)
MCV RBC AUTO: 90.6 FL (ref 80–100)
PDW BLD-RTO: 15.5 % (ref 12.4–15.4)
PERFORMED ON: NORMAL
PHOSPHORUS: 3.8 MG/DL (ref 2.5–4.9)
PLATELET # BLD: 174 K/UL (ref 135–450)
PMV BLD AUTO: 9.3 FL (ref 5–10.5)
POTASSIUM REFLEX MAGNESIUM: 3.3 MMOL/L (ref 3.5–5.1)
POTASSIUM SERPL-SCNC: 3.3 MMOL/L (ref 3.5–5.1)
RBC # BLD: 3.29 M/UL (ref 4.2–5.9)
SODIUM BLD-SCNC: 142 MMOL/L (ref 136–145)
WBC # BLD: 6.1 K/UL (ref 4–11)

## 2021-12-14 PROCEDURE — 85027 COMPLETE CBC AUTOMATED: CPT

## 2021-12-14 PROCEDURE — 2500000003 HC RX 250 WO HCPCS: Performed by: INTERNAL MEDICINE

## 2021-12-14 PROCEDURE — 2060000000 HC ICU INTERMEDIATE R&B

## 2021-12-14 PROCEDURE — 2580000003 HC RX 258: Performed by: INTERNAL MEDICINE

## 2021-12-14 PROCEDURE — 94761 N-INVAS EAR/PLS OXIMETRY MLT: CPT

## 2021-12-14 PROCEDURE — 83735 ASSAY OF MAGNESIUM: CPT

## 2021-12-14 PROCEDURE — APPSS45 APP SPLIT SHARED TIME 31-45 MINUTES: Performed by: NURSE PRACTITIONER

## 2021-12-14 PROCEDURE — 97530 THERAPEUTIC ACTIVITIES: CPT

## 2021-12-14 PROCEDURE — 80069 RENAL FUNCTION PANEL: CPT

## 2021-12-14 PROCEDURE — APPNB45 APP NON BILLABLE 31-45 MINUTES: Performed by: PHYSICIAN ASSISTANT

## 2021-12-14 PROCEDURE — APPNB45 APP NON BILLABLE 31-45 MINUTES: Performed by: NURSE PRACTITIONER

## 2021-12-14 PROCEDURE — 6360000002 HC RX W HCPCS: Performed by: HOSPITALIST

## 2021-12-14 PROCEDURE — C9113 INJ PANTOPRAZOLE SODIUM, VIA: HCPCS | Performed by: HOSPITALIST

## 2021-12-14 PROCEDURE — 2580000003 HC RX 258: Performed by: HOSPITALIST

## 2021-12-14 PROCEDURE — 85730 THROMBOPLASTIN TIME PARTIAL: CPT

## 2021-12-14 PROCEDURE — 36415 COLL VENOUS BLD VENIPUNCTURE: CPT

## 2021-12-14 RX ORDER — HYDRALAZINE HYDROCHLORIDE 20 MG/ML
10 INJECTION INTRAMUSCULAR; INTRAVENOUS EVERY 6 HOURS PRN
Status: DISCONTINUED | OUTPATIENT
Start: 2021-12-14 | End: 2021-12-21 | Stop reason: HOSPADM

## 2021-12-14 RX ADMIN — PANTOPRAZOLE SODIUM 40 MG: 40 INJECTION, POWDER, FOR SOLUTION INTRAVENOUS at 08:21

## 2021-12-14 RX ADMIN — LABETALOL HYDROCHLORIDE 10 MG: 5 INJECTION INTRAVENOUS at 04:37

## 2021-12-14 RX ADMIN — SODIUM CHLORIDE, PRESERVATIVE FREE 10 ML: 5 INJECTION INTRAVENOUS at 08:21

## 2021-12-14 RX ADMIN — LABETALOL HYDROCHLORIDE 10 MG: 5 INJECTION INTRAVENOUS at 19:00

## 2021-12-14 RX ADMIN — SODIUM CHLORIDE: 9 INJECTION, SOLUTION INTRAVENOUS at 11:18

## 2021-12-14 RX ADMIN — SODIUM CHLORIDE: 9 INJECTION, SOLUTION INTRAVENOUS at 22:10

## 2021-12-14 RX ADMIN — Medication 10 ML: at 08:27

## 2021-12-14 ASSESSMENT — PAIN SCALES - WONG BAKER
WONGBAKER_NUMERICALRESPONSE: 0

## 2021-12-14 ASSESSMENT — PAIN SCALES - GENERAL
PAINLEVEL_OUTOF10: 0
PAINLEVEL_OUTOF10: 0

## 2021-12-14 NOTE — FLOWSHEET NOTE
Pt admitted into room 5107. Pt assisted into bed and placed on telemetry. NSR per telemetry. Box # confirmed with the [facility]. BP elevated (recorded) Manual BP lower but still elevated. Prn Labetalol 10 mg given IV per order for systolic > 456. Pt oriented to room bed unit and call light. Pt encouraged to call for assist out of bed. Bed alarm on and call light in reach. Will continue to monitor BP.

## 2021-12-14 NOTE — PROGRESS NOTES
Clinical Pharmacy Note  Heparin Dosing       Lab Results   Component Value Date    APTT 73.8 12/14/2021     Lab Results   Component Value Date    HGB 9.7 12/14/2021    HCT 29.8 12/14/2021     12/14/2021       Current Infusion Rate: 1000 units/hr    Plan:  Rate: Continue 1000 units/hr  Next aPTT: 0600  12/15/21    Pharmacy will continue to monitor and adjust based on aPTT results.     Romelia Pierce, [facility]  12/14/2021 7:05 AM

## 2021-12-14 NOTE — PLAN OF CARE
Problem: Nutrition  Goal: Optimal nutrition therapy  Outcome: Ongoing     Nutrition Problem #1: Inadequate oral intake  Intervention: Food and/or Nutrient Delivery: Continue NPO (start nutrition when appropriate)  Nutritional Goals:  Tolerate most appropriate nutrition therapy

## 2021-12-14 NOTE — PROGRESS NOTES
Hospitalist Progress Note      PCP: Vee Obando, APRN - CNP    Date of Admission: 12/12/2021    Chief Complaint: abdominal pain    Hospital Course: 76m admitted with recurrence of abdominal pain secondary to sigmoid volvulus    Subjective: Patient seen on room air, no distress, denies fever cough, abdominal pain, n/v      Medications:  Reviewed    Infusion Medications    sodium chloride      sodium chloride 125 mL/hr at 12/14/21 1431    heparin (PORCINE) Infusion 1,000 Units/hr (12/14/21 1431)    dextrose       Scheduled Medications    cloNIDine  1 patch TransDERmal Weekly    pantoprazole  40 mg IntraVENous Daily    And    sodium chloride (PF)  10 mL IntraVENous Daily    sodium chloride flush  10 mL IntraVENous 2 times per day    insulin lispro  0-12 Units SubCUTAneous 6 times per day     PRN Meds: hydrALAZINE, sodium chloride flush, sodium chloride, potassium chloride **OR** potassium alternative oral replacement **OR** potassium chloride, potassium chloride, magnesium sulfate, promethazine **OR** ondansetron, magnesium hydroxide, acetaminophen **OR** acetaminophen, morphine, labetalol, glucose, dextrose, glucagon (rDNA), dextrose      Intake/Output Summary (Last 24 hours) at 12/14/2021 1512  Last data filed at 12/14/2021 1431  Gross per 24 hour   Intake 709.75 ml   Output 150 ml   Net 559.75 ml       Physical Exam Performed:    BP (!) 163/82   Pulse 50   Temp 98.1 °F (36.7 °C) (Oral)   Resp 16   Ht 5' 9\" (1.753 m)   Wt 209 lb 7 oz (95 kg)   SpO2 99%   BMI 30.93 kg/m²     General appearance: No apparent distress, appears stated age and cooperative. HEENT: Pupils equal, round, and reactive to light. Conjunctivae/corneas clear. Neck: Supple, with full range of motion. No jugular venous distention. Trachea midline.   Respiratory:  Normal respiratory effort, no use of accessory muscles, no intercostal retractions  Cardiovascular: Regular rate and rhythm   Abdomen: Soft, non-tender, non-distended    Musculoskeletal: No clubbing, cyanosis or edema bilaterally. Skin: Skin color, texture, turgor normal.     Neurologic:  R sided weakness  Psychiatric: Alert and oriented        Labs:   Recent Labs     12/12/21  1313 12/13/21  0751 12/14/21  0525   WBC 6.5 6.2 6.1   HGB 10.3* 9.5* 9.7*   HCT 32.3* 30.9* 29.8*    145 174     Recent Labs     12/13/21  0751 12/13/21  1222 12/14/21  0525    139 142   K 3.9 4.0 3.3*  3.3*    108 110   CO2 14* 17* 15*   BUN 22* 22* 27*   CREATININE 1.7* 1.8* 2.1*   CALCIUM 9.3 9.2 8.9   PHOS  --  3.5 3.8     Recent Labs     12/12/21  1313   AST 11*   ALT 7*   BILIDIR <0.2   BILITOT 0.4   ALKPHOS 138*     No results for input(s): INR in the last 72 hours. No results for input(s): Kathyrn Belch in the last 72 hours. Urinalysis:      Lab Results   Component Value Date    NITRU Negative 12/12/2021    WBCUA 7 12/12/2021    RBCUA 10 12/12/2021    BLOODU SMALL 12/12/2021    SPECGRAV 1.010 12/12/2021    GLUCOSEU Negative 12/12/2021       Radiology:  CT ABDOMEN PELVIS WO CONTRAST Additional Contrast? None   Final Result   Recurrent or ongoing sigmoid volvulus with mild surrounding inflammatory fat   stranding at the mildly left site, similar to prior. No pneumatosis, free   fluid, free air, or mesenteric or portal venous gas. Sigmoid diverticulosis, without evidence of acute diverticulitis. Mild prostate gland enlargement.       RECOMMENDATIONS:   Unavailable                 Assessment/Plan:    Active Hospital Problems    Diagnosis Date Noted    Abdominal pain [R10.9] 12/12/2021    Volvulus ([address] [K56.2] 11/13/2021     Recurrent Volvulus  - OR Wesndesday  - denies abdominal pain, n/v    Afib  - holding eliquis, he gtts perioperatively    HTN  - continue catapres patch  - adding prn IV hydralazine    GERD   continue PPI    GILL  - IVFs     Diet: Diet NPO  Code Status: Full Code         Yuval Carrillo MD

## 2021-12-14 NOTE — PROGRESS NOTES
Physical Therapy  Facility/Department: [address] PROGRESSIVE CARE  Daily Treatment Note/COTX with OT   NAME: Beau Muniz  : 1945  MRN: [id number]    Date of Service: 2021  Discharge Recommendations:  Continue to assess pending progress, Patient would benefit from continued therapy after discharge      Assessment   Body structures, Functions, Activity limitations: Decreased functional mobility   Assessment: Patient is a 77-year-old male with past medical history of diabetes mellitus who presented to hospital on 21 for abdominal pain and diarrhea. Pt underwent sigmoid volvulus decompression on 21. Planning OR on 12/15/21. Prior to admission, pt living alone in home setting with ramped entrance, reports home health aide for possibley 4 hours per day, 7 days/week per prior chart review. It is unclear how much PHYSICAL assist they provde for the pt. Today, pt needed Mod A x 1-2 for bed mobility supine to sit, and for attempted stance at eob. Pt was unable to come to full upright stance. Noted planned to go to OR tomorrow, therefore will need NEW therapy orders to continue post-op. Will make continued recs based onpt's progress post-procedure. Will follow. Treatment Diagnosis: impaired mobility  Specific instructions for Next Treatment: cotx  Prognosis: Fair  PT Education: PT Role; Plan of Care; General Safety  REQUIRES PT FOLLOW UP: Yes     Patient Diagnosis(es): The primary encounter diagnosis was Volvulus of sigmoid colon ([address] A diagnosis of Generalized abdominal pain was also pertinent to this visit. has a past medical history of Cerebral artery occlusion with cerebral infarction ([address] Diabetes mellitus ([address] and Hypertension. has a past surgical history that includes Colonoscopy (N/A, 2021) and Colonoscopy (N/A, 2021).     Restrictions  Restrictions/Precautions  Restrictions/Precautions: Fall Risk  Position Activity Restriction  Other position/activity restrictions: H/o CVA w/ right side weakness  Subjective   General  Chart Reviewed: Yes  Additional Pertinent Hx: Patient is a 78-year-old male with past medical history of diabetes mellitus who presented to hospital on 21 for abdominal pain and diarrhea. Pt underwent sigmoid volvulus decompression on 21. Response To Previous Treatment: Patient with no complaints from previous session. Family / Caregiver Present: No (brother initially present, but exited as soon as therapy entered room.)  Referring Practitioner: Kike Drew MD  Subjective  Subjective: Pt in supine, denied c/o pain or other. Agreed to working with therapy. Orientation  Orientation  Overall Orientation Status: Within Functional Limits  Orientation Level: Oriented to place; Oriented to time; Oriented to person      Social/Functional History  Social/Functional History  Lives With: Alone  Type of Home: House  Home Layout: Two level, Able to Live on Main level with bedroom/bathroom  Home Access: Ramped entrance  Bathroom Shower/Tub: Walk-in shower, Shower chair with back  Gile Toilet: Handicap height  Bathroom Equipment: Grab bars in shower, Grab bars around toilet  Home Equipment: Wheelchair-electric  ADL Assistance: Needs assistance (Aide assists with bathing/dressing)  Homemaking Assistance: Needs assistance (Aide completes)  Ambulation Assistance:  (Uses w/c)  Transfer Assistance: Independent  Active : No  Patient's  Info: Aide assists (?)  Additional Comments: Sleeps in a hospital bed. Questionable historian.      Objective   Bed mobility  Supine to Sit: Moderate assistance  Sit to Supine: Moderate assistance  Scootin Person assistance (up into bed/to comfort at end of session.)  Transfers  Sit to Stand: Moderate Assistance; 2 Person Assistance (attempted sit<>stand at eob, needed Mod A x 2, pt unable to come to full erect posture or take steps/side steps.   Opted return back to eob, then into supine, Ax 2 for scooting/repositioning in bed to comfort. Pt with call light and needs in reach.)  Stand to sit: Moderate Assistance; 2 Person Assistance  Ambulation  Ambulation?: No (non-ambulatory baseline.)     Balance  Sitting - Static: Good  Sitting - Dynamic: Good  Standing - Static: Poor  Standing - Dynamic:  (unable)  Comments: SB/CGA sitting eob, Mod A x 2 for modified stance at eob only. AM-PAC Score  AM-PAC Inpatient Mobility Raw Score : 9 (12/14/21 1244)  AM-PAC Inpatient T-Scale Score : 30.55 (12/14/21 1244)  Mobility Inpatient CMS 0-100% Score: 81.38 (12/14/21 1244)  Mobility Inpatient CMS G-Code Modifier : CM (12/14/21 1244)     Goals  Short term goals  Time Frame for Short term goals: by acute discharge--goals ongoing 12/14 with limited progress  Short term goal 1: bed mobiltiy with min A  Short term goal 2: stand pivot vs squat pviot with min A  Patient Goals   Patient goals : none stated    Plan    Plan  Times per week: 3-5x/week  Specific instructions for Next Treatment: cotx  Current Treatment Recommendations: Strengthening, Functional Mobility Training, Transfer Training, Balance Training, Endurance Training, Patient/Caregiver Education & Training, Safety Education & Training, Equipment Evaluation, Education, & procurement, Neuromuscular Re-education  Safety Devices  Type of devices:  All fall risk precautions in place, Call light within reach, Gait belt, Patient at risk for falls, Left in bed, Nurse notified   Therapy Time   Individual Concurrent Group Co-treatment   Time In 1200         Time Out 1240         Minutes 40          3 act charges   John Gardiner PT Electronically signed by John Gardiner PT on 12/14/2021 at 12:45 PM

## 2021-12-14 NOTE — ANESTHESIA PRE PROCEDURE
Department of Anesthesiology  Preprocedure Note       Name:  Tierra Carranza   Age:  64 y.o.  :  1945                                          MRN:  [id number]         Date:  12/15/2021      Surgeon: Bob Lebron):  Myron Lugo MD    Procedure: Procedure(s):  SIGMOID COLECTOMY    Medications prior to admission:   Prior to Admission medications    Medication Sig Start Date End Date Taking?  Authorizing Provider   polyethylene glycol (GLYCOLAX) 17 g packet Take 17 g by mouth daily 21  Cristiane Gamez MD   docusate sodium (COLACE) 100 MG capsule Take 100 mg by mouth daily    Historical Provider, MD   dilTIAZem (DILACOR XR) 180 MG extended release capsule Take 180 mg by mouth daily    Historical Provider, MD   potassium chloride (KLOR-CON M) 20 MEQ extended release tablet Take 20 mEq by mouth 2 times daily    Historical Provider, MD   ferrous sulfate (FE TABS 325) 325 (65 Fe) MG EC tablet Take 325 mg by mouth 2 times daily    Historical Provider, MD   bisacodyl (DULCOLAX) 5 MG EC tablet Take 10 mg by mouth daily as needed for Constipation    Historical Provider, MD   allopurinol (ZYLOPRIM) 100 MG tablet Take 100 mg by mouth daily    Historical Provider, MD   apixaban (ELIQUIS) 5 MG TABS tablet Take 5 mg by mouth 2 times daily    Historical Provider, MD   atorvastatin (LIPITOR) 20 MG tablet Take 20 mg by mouth daily     Historical Provider, MD   Cholecalciferol 50 MCG (2000) TABS Take 50 mcg by mouth daily    Historical Provider, MD   omeprazole (PRILOSEC) 20 MG delayed release capsule Take 20 mg by mouth daily    Historical Provider, MD       Current medications:    Current Facility-Administered Medications   Medication Dose Route Frequency Provider Last Rate Last Admin    ciprofloxacin (CIPRO) IVPB 400 mg  400 mg IntraVENous Once Myron Lugo  mL/hr at 12/15/21 1236 400 mg at 12/15/21 1236    metronidazole (FLAGYL) 500 mg in NaCl 100 mL IVPB premix  500 mg IntraVENous Once Chryl Habermann, MD        0.9 % sodium chloride infusion   IntraVENous Continuous Consuelo Knox MD        sodium chloride flush 0.9 % injection 5-40 mL  5-40 mL IntraVENous 2 times per day Consuelo Knox MD        sodium chloride flush 0.9 % injection 5-40 mL  5-40 mL IntraVENous PRN Consuelo Knox MD        0.9 % sodium chloride infusion  25 mL IntraVENous PRN Consuelo Knox MD        hydrALAZINE (APRESOLINE) injection 10 mg  10 mg IntraVENous Q6H PRN River Gonzales MD        cloNIDine (CATAPRES) 0.2 MG/24HR 1 patch  1 patch TransDERmal Weekly River Gonzales MD   1 patch at 12/13/21 1835    pantoprazole (PROTONIX) injection 40 mg  40 mg IntraVENous Daily River Gonzales MD   40 mg at 12/15/21 0801    And    sodium chloride (PF) 0.9 % injection 10 mL  10 mL IntraVENous Daily River Gonzales MD   10 mL at 12/15/21 0801    sodium chloride flush 0.9 % injection 10 mL  10 mL IntraVENous 2 times per day Melquiades Salazar MD   10 mL at 12/15/21 0801    sodium chloride flush 0.9 % injection 10 mL  10 mL IntraVENous PRN Melquiades Salazar MD        0.9 % sodium chloride infusion  25 mL IntraVENous PRN Melquiades Salazar MD        potassium chloride (KLOR-CON M) extended release tablet 40 mEq  40 mEq Oral PRN Melquiades Salazar MD        Or    potassium bicarb-citric acid (EFFER-K) effervescent tablet 40 mEq  40 mEq Oral PRN Melquiades Salazar MD        Or    potassium chloride 10 mEq/100 mL IVPB (Peripheral Line)  10 mEq IntraVENous PRN Melquiades Salazar MD 75 mL/hr at 12/15/21 1022 Rate Verify at 12/15/21 1022    potassium chloride 10 mEq/100 mL IVPB (Peripheral Line)  10 mEq IntraVENous PRN Melquiades Salazar MD        magnesium sulfate 2000 mg in 50 mL IVPB premix  2,000 mg IntraVENous PRN Melquiades Salazar MD        promethazine (PHENERGAN) tablet 12.5 mg  12.5 mg Oral Q6H PRN Melquiades Salazar MD        Or    ondansetron (ZOFRAN) injection 4 mg  4 mg IntraVENous Q6H PRN Melquiades Salazar MD        magnesium hydroxide (MILK OF MAGNESIA) 400 MG/5ML suspension 30 mL  30 mL Oral Daily PRN Jakob Thompson MD        acetaminophen (TYLENOL) tablet 650 mg  650 mg Oral Q6H PRN Jakob Thompson MD        Or    acetaminophen (TYLENOL) suppository 650 mg  650 mg Rectal Q6H PRN Jakob Thompson MD        0.9 % sodium chloride infusion   IntraVENous Continuous Idnira Huynh  mL/hr at 12/15/21 1022 Rate Verify at 12/15/21 1022    morphine (PF) injection 4 mg  4 mg IntraVENous Q4H PRN Jakob Thompson MD        labetalol (NORMODYNE;TRANDATE) injection 10 mg  10 mg IntraVENous Q4H PRN Jakob Thompson MD   10 mg at 12/15/21 0507    [Held by provider] heparin 25,000 units in dextrose 5 % 250 mL infusion (rate based)  0-3,000 Units/hr IntraVENous Continuous Jakob Thompson MD   Stopped at 12/15/21 0946    glucose (GLUTOSE) 40 % oral gel 15 g  15 g Oral PRN Jakob Thompson MD        dextrose 50 % IV solution  12.5 g IntraVENous PRN Jakob Thompson MD        glucagon (rDNA) injection 1 mg  1 mg IntraMUSCular PRN Jakob Thompson MD        dextrose 5 % solution  100 mL/hr IntraVENous PRMARICHUY Thompson MD        insulin lispro (HUMALOG) injection vial 0-12 Units  0-12 Units SubCUTAneous 6 times per day Jakob Thompson MD           Allergies:  No Known Allergies    Problem List:    Patient Active Problem List   Diagnosis Code    Volvulus ([facility] [address] K56.2    Abdominal pain R10.9       Past Medical History:        Diagnosis Date    Cerebral artery occlusion with cerebral infarction ([address]     Diabetes mellitus ([facility] [address]     Hypertension        Past Surgical History:        Procedure Laterality Date    COLONOSCOPY N/A 11/13/2021    COLONOSCOPY DIAGNOSTIC performed by Katie Melara MD at [facility]  COLONOSCOPY N/A 12/12/2021    COLONOSCOPY FLEXIBLE W/ DECOMPRESSION performed by Nazia Gutierrez MD at [address] [place] [address] History:    Social History     Tobacco Use    Smoking status: Never Smoker    Smokeless tobacco: Never Used   Substance Use Topics    Alcohol use: Not Currently                                Counseling given: Not Answered      Vital Signs (Current):   Vitals:    12/15/21 0845 12/15/21 0928 12/15/21 1146 12/15/21 1229   BP: (!) 142/48  (!) 159/63 (!) 189/78   Pulse: 53  (!) 48 50   Resp: 18 20 18   Temp: 97.6 °F (36.4 °C)  97.4 °F (36.3 °C) 97.6 °F (36.4 °C)   TempSrc: Oral  Oral Temporal   SpO2: 97% 97% 98% 98%   Weight:    212 lb 1.3 oz (96.2 kg)   Height:    5' 9\" (1.753 m)                                              BP Readings from Last 3 Encounters:   12/15/21 (!) 189/78   11/17/21 (!) 165/70       NPO Status: Time of last liquid consumption: 0809                        Time of last solid consumption: 0809                        Date of last liquid consumption: 12/14/21                        Date of last solid food consumption: 12/14/21    BMI:   Wt Readings from Last 3 Encounters:   12/15/21 212 lb 1.3 oz (96.2 kg)   11/17/21 203 lb 14.8 oz (92.5 kg)     Body mass index is 31.32 kg/m².     CBC:   Lab Results   Component Value Date    WBC 5.5 12/15/2021    RBC 3.00 12/15/2021    HGB 9.0 12/15/2021    HCT 27.4 12/15/2021    MCV 91.5 12/15/2021    RDW 15.9 12/15/2021     12/15/2021       CMP:   Lab Results   Component Value Date     12/15/2021    K 3.1 12/15/2021    K see below 12/15/2021     12/15/2021    CO2 14 12/15/2021    BUN 29 12/15/2021    CREATININE 2.2 12/15/2021    GFRAA 35 12/15/2021    GFRAA >60 07/31/2010    AGRATIO 1.0 11/13/2021    LABGLOM 29 12/15/2021    GLUCOSE 66 12/15/2021    PROT 7.7 12/12/2021    PROT 6.9 07/14/2010    CALCIUM 8.5 12/15/2021    BILITOT 0.4 12/12/2021    ALKPHOS 138 12/12/2021    AST 11 12/12/2021    ALT 7 12/12/2021       POC Tests:   Recent Labs     12/15/21  1229   POCGLU 70       Coags:   Lab Results   Component Value Date    APTT 68.5 12/15/2021       HCG (If Applicable): No results found for: PREGTESTUR, PREGSERUM, HCG, HCGQUANT     ABGs: No results found for: PHART, PO2ART, IFD8SLX, MFQ9FHQ, BEART, J8EDTSQC     Type & Screen (If Applicable):  No results found for: LABABO, LABRH    Drug/Infectious Status (If Applicable):  No results found for: HIV, HEPCAB    COVID-19 Screening (If Applicable):   Lab Results   Component Value Date    COVID19 Not Detected 12/12/2021           Anesthesia Evaluation   no history of anesthetic complications (patient denies):   Airway: Mallampati: III       Mouth opening: > = 3 FB Dental:    (+) edentulous      Pulmonary: breath sounds clear to auscultation      (-) COPD, asthma, sleep apnea, rhonchi, rales and not a current smoker                           Cardiovascular:  Exercise tolerance: no interval change, Ambulates holding railing at home.  (+) hypertension:, dysrhythmias (sick sinus syndrome, recently started diltiazem): atrial fibrillation and SVT,     (-) orthopnea,  LOPEZ, weak pulses and peripheral edema        Rate: normal                 ROS comment: Echocardiogram (2010): Impression:    Concentric LVH with excellent systolic function. LVEF: 65-70% Left atrial enlargement. EP Test:  SR, 1st degree AVB with 3% AF burden. HR . 3 pauses > 2 sec. Longest pause was 2.6 sec.  4 SVT runs, fastest being 178 BPM.  11 beat run of VT at 200 BPM. 7% burden APC's, <1% burden PVC's. Neuro/Psych:   (+) CVA (right sided hemiparesis): residual symptoms,    (-) seizures           GI/Hepatic/Renal:   (+) GERD (PPI):, renal disease (GILL on CKD):,      (-) liver disease and no morbid obesity      ROS comment: Volvulus    CT Abdomen/Pelvis:  Result  Recurrent or ongoing sigmoid volvulus with mild surrounding inflammatory fat stranding at the mildly left site, similar to prior. No pneumatosis, free fluid, free air, or mesenteric or portal venous gas. Sigmoid diverticulosis, without evidence of acute diverticulitis. Mild prostate gland enlargement.   .   Endo/Other:    (+) Diabetes (HgbA1c: 6.1%)Type II DM, well controlled, , blood dyscrasia (Apixaban, heparin gtt since admission; H&H: 9.7/28.9): anticoagulation therapy and anemia:., electrolyte abnormalities (hypokalemia second bag of repletement infusing in preop holding.), . Abdominal:             Vascular: Other Findings: Dry mucous membranes. Right upper extremity with contracture. Anesthesia Plan      general     ASA 3     (NPO since 12/12. H/o CVA with residual right hemiparesis. Controlled induction. Avoid succinylcholine. Maintain SBP > 100. NPO appropriate, denies nausea/symptomatic GERD.)  Induction: intravenous. MIPS: Postoperative opioids intended and Prophylactic antiemetics administered. Anesthetic plan and risks discussed with patient. Use of blood products discussed with patient whom consented to blood products. Plan discussed with CRNA. This pre-anesthesia assessment may be used as a history and physical.    DOS STAFF ADDENDUM:    Pt seen and examined, chart reviewed (including anesthesia, drug and allergy history). No interval changes to history and physical examination. Anesthetic plan, risks, benefits, alternatives, and personnel involved discussed with patient. Patient verbalized an understanding and agrees to proceed.       Josie Williamson MD  December 15, 2021  1:07 PM

## 2021-12-14 NOTE — PROGRESS NOTES
Comprehensive Nutrition Assessment    Type and Reason for Visit:  Initial, Positive Nutrition Screen    Nutrition Recommendations/Plan:   NPO, will monitor POC for nutrition   Will monitor nutritional adequacy, nutrition-related labs, weights, BMs, and clinical progress     Nutrition Assessment:  + Screen for MST 2. Pt admitted with Recurrent sigmoid volvulus. Plan for OR tomorrow. Currently NPO. Pt with second volvulus in last month, ongoing issues with po d/t medical issues. Did not note any wt loss in our records. Will monitor POC for nutrition. Malnutrition Assessment:  Malnutrition Status: At risk for malnutrition (Comment)    Context:  Chronic Illness     Findings of the 6 clinical characteristics of malnutrition:  Energy Intake:  7 - 75% or less estimated energy requirements for 1 month or longer  Weight Loss:  No significant weight loss     Body Fat Loss:  No significant body fat loss     Muscle Mass Loss:  No significant muscle mass loss    Fluid Accumulation:  No significant fluid accumulation     Strength:  Not Performed    Estimated Daily Nutrient Needs:  Energy (kcal):  4139-7813 kcal (20-22 kcal/kg ABW); Weight Used for Energy Requirements:  Current     Protein (g):   gm (1.2-1.8 gm/kg IBW); Weight Used for Protein Requirements:  Ideal        Fluid (ml/day):   ; Method Used for Fluid Requirements:  1 ml/kcal      Nutrition Related Findings:  LBM 12/14      Wounds:  None (some red areas)       Current Nutrition Therapies:    Diet NPO    Anthropometric Measures:  · Height: 5' 9\" (175.3 cm)  · Current Body Weight: 209 lb (94.8 kg)   · Admission Body Weight: 213 lb (96.6 kg)    · Usual Body Weight: 205 lb (93 kg)     · Ideal Body Weight: 160 lbs; % Ideal Body Weight 130.6 %   · BMI: 30.8  · BMI Categories: Obese Class 1 (BMI 30.0-34. 9)       Nutrition Diagnosis:   · Inadequate oral intake related to altered GI function as evidenced by NPO or clear liquid status due to medical condition      Nutrition Interventions:   Food and/or Nutrient Delivery:  Continue NPO (start nutrition when appropriate)  Nutrition Education/Counseling:  No recommendation at this time   Coordination of Nutrition Care:  Continue to monitor while inpatient    Goals: Tolerate most appropriate nutrition therapy       Nutrition Monitoring and Evaluation:   Behavioral-Environmental Outcomes:  None Identified   Food/Nutrient Intake Outcomes:  Diet Advancement/Tolerance  Physical Signs/Symptoms Outcomes:  Biochemical Data, GI Status, Nutrition Focused Physical Findings, Skin, Weight     Discharge Planning:     Too soon to determine     Electronically signed by Tristen Ramirez RD, LD on 12/14/21 at 12:36 PM EST    Contact: [phone]

## 2021-12-14 NOTE — OP NOTE
[address] [place] [address] [address]                                OPERATIVE REPORT    PATIENT NAME: Azalea Rick                     :        1945  MED REC NO:   [id number]                          ROOM:       5107  ACCOUNT NO:   [de-identified]                           ADMIT DATE: 2021  PROVIDER:     Rosalie Hankins MD    Corrected work type (2021 am)    INTRODUCTION:  This is a 75-year-old patient who had a history of  sigmoid volvulus one month ago. He had a colonic decompression. Surgical repair was deferred. He presents one month later to the  emergency room with recurrent sigmoid volvulus. We were consulted to  perform sigmoid decompression. The risk, benefits and alternatives  including risk of bleeding, infection, sedation and perforation were  discussed. Informed consent was obtained for the patient. Pulse  oximetry and blood pressure monitored throughout the procedure. The  patient was sedated with 2 mg of Versed and 50 mcg of fentanyl for the  procedure. PROCEDURE:  Digital rectal exam revealed no mass lesion. Colonoscope  was passed without difficulty and direct visualization to the proximal  transverse colon. Upon passing the scope, the sigmoid volvulus twist  was noted at 20 cm from the anal verge. The scope was able to be gently  twisted through this area. There was no mucosal irritation with passage  of the scope. The sigmoid colon and transverse colon proximally to this  was extensively dilated. The mucosa was actually decently clean in the  sigmoid and transverse colon. Upon reaching the right colon, there was  solid stool. The scope was not advanced further. All of the excessive  air was suctioned from the transverse and sigmoid colon.   We did not  attempt reverse torsion of the sigmoid area as he is on Eliquis and this  is his second episode and he will need definitive surgical repair. Anyway, the abdominal exam after suctioning was completely flat, benign  and soft. The scope was withdrawn to the rectum. Any remnant air from  the rectum was suctioned as well. There was some semi-solid stool and  liquid stool in the rectum. Retroflexed view of the rectum was limited  by stool but did not show any mass lesions with the limits of the prep. The scope was straightened and removed. The patient tolerated the  procedure well with no immediate complications. IMPRESSION:  1. Sigmoid volvulus at 20 cm from the anal verge, now status post  decompression of the transverse and descending colon and proximal  sigmoid, proximal to 20 cm.  2.  Solid stool on the right colon so the scope was not advanced into  the right colon. 3.  Limited views with the retroflexed view of the rectum due to  retained stool. RECOMMENDATIONS:  1. Hold Eliquis. 2.  N.p.o.  3.  Surgical repair as this is the second volvulus for the past month. I did speak with Dr. Celi Thompson who was consulting for surgery. The estimated blood loss was 0.         Kelly Briones MD    D: 12/12/2021 17:36:52       T: 12/12/2021 20:56:28     [email or web address]  Job#: [id number]     Doc#: [id number]

## 2021-12-14 NOTE — PLAN OF CARE
Problem: Falls - Risk of:  Goal: Will remain free from falls  Description: Will remain free from falls  Outcome: Ongoing     Problem: Falls - Risk of:  Goal: Absence of physical injury  Description: Absence of physical injury  Outcome: Ongoing     Problem: Skin Integrity:  Goal: Will show no infection signs and symptoms  Description: Will show no infection signs and symptoms  Outcome: Ongoing     Problem: Skin Integrity:  Goal: Absence of new skin breakdown  Description: Absence of new skin breakdown  Outcome: Ongoing     Problem: Nutrition  Goal: Optimal nutrition therapy  12/14/2021 1532 by Jairo Adkins RN  Outcome: Ongoing   Electronically signed by Leonardo Renae RN on 12/14/2021 at 3:32 PM

## 2021-12-14 NOTE — PROGRESS NOTES
[facility]                                   Daily Progress Note                                                         Pt Name: Jacques Pinedo  Medical Record Number: [id number]  Date of Birth 1945   Today's Date: 12/14/2021  Chief Complaint   Patient presents with    Abdominal Pain        ASSESSMENT/PLAN  Sigmoid volvulus, recurring              Now with two episodes in the last month              Currently decompressed              Recommend colectomy to prevent recurring issues              Hold Eliquis, on heparin for now              Plan OR Wednesday            SUBJECTIVE  Eduard Grigsby is resting in bed. Denies abdominal pain. Nondistended. OBJECTIVE  VITALS:  height is 5' 9\" (1.753 m) and weight is 209 lb 7 oz (95 kg). His oral temperature is 98.2 °F (36.8 °C). His blood pressure is 173/81 (abnormal) and his pulse is 62. His respiration is 16 and oxygen saturation is 98%. INTAKE/OUTPUT:      Intake/Output Summary (Last 24 hours) at 12/14/2021 1117  Last data filed at 12/14/2021 0443  Gross per 24 hour   Intake 10 ml   Output 150 ml   Net -140 ml     GENERAL: alert, cooperative, no distress  LUNGS: clear to ausculation, without wheezes, rales or rhonci  HEART: normal rate and regular rhythm  ABDOMEN: Soft, non tender, non distended. EXTREMITY: no cyanosis, clubbing or edema    I/O last 3 completed shifts:   In: 10 [I.V.:10]  Out: 150 [Urine:150]      LABS  Recent Labs     12/12/21  1313 12/12/21  2050 12/13/21  0751 12/14/21  0525   WBC 6.5  --    < > 6.1   HGB 10.3*  --    < > 9.7*   HCT 32.3*  --    < > 29.8*     --    < > 174     --    < > 142   K 4.5  --    < > 3.3*  3.3*     --    < > 110   CO2 19*  --    < > 15*   BUN 22*  --    < > 27*   CREATININE 2.0*  --    < > 2.1*   MG  --   --   --  1.80   PHOS  --   --    < > 3.8   CALCIUM 9.4  --    < > 8.9   AST 11*  --   --   --    ALT 7*  --   --   -- BILITOT 0.4  --   --   --    BILIDIR <0.2  --   --   --    NITRU  --  Negative  --   --    COLORU  --  YELLOW  --   --     < > = values in this interval not displayed. EDUCATION  Patient educated about Disease Process, Medications, Smoking Cessation, Oxygenation, Incentive Spirometry and Deep Breath and Cough, Diabetes, Hyperlipidemia, Smoking Cessation, Nutrition, Exercise and Hypertension    Electronically signed by OBI Reza CNP on 12/14/2021 at [address] and Vascular Surgery   [phone] Office  [phone] Fax     As above  Plan for sigmoid colectomy on 12/15  The risks, benefits and alternatives to the planned procedure were discussed. Patient expressed an understanding and is willing to proceed.     Electronically signed by Dion Rivera MD on 12/14/2021 at 2:21 PM

## 2021-12-14 NOTE — PROGRESS NOTES
Occupational Therapy  Facility/Department: [facility] 5W PROGRESSIVE CARE  Daily Treatment Note  NAME: Beau Muniz  : 1945  MRN: [id number]    Date of Service: 2021    Discharge Recommendations:  Continue to assess pending progress, Patient would benefit from continued therapy after discharge, 3-5 sessions per week       Assessment   Performance deficits / Impairments: Decreased functional mobility ; Decreased ADL status; Decreased ROM; Decreased strength  Assessment: Pt is a 63 yo M admitted with abdominal pain. Pt has h/o CVA with right sided weakness (~8 yrs prior). PTA, pt reportedly lives at home alone and transfers to power w/c independently and receives assist for ADLs and IADLs from [facility] [employer] approx 4hr/day 7 days a week. Unclear how much assist aides are providing. TODAY- pt performed bed mob and fxl transfers with overall mod A x2 for safety. Pt able to tolerate sitting EOB ~5-7 mins with SBA/CGA. Planned OR 12/15/21 where we will need new orders. Will continue to monitor progress for futher recommendations. Will continue to follow during acute to maximize safety and independence. Prognosis: Good  OT Education: OT Role; Plan of Care; ADL Adaptive Strategies; Transfer Training; Energy Conservation  REQUIRES OT FOLLOW UP: Yes  Activity Tolerance  Activity Tolerance: Patient limited by fatigue; Patient Tolerated treatment well  Safety Devices  Safety Devices in place: Yes  Type of devices: Call light within reach; Left in bed; Nurse notified; Patient at risk for falls; Bed alarm in place         Patient Diagnosis(es): The primary encounter diagnosis was Volvulus of sigmoid colon ([facility] [address] A diagnosis of Generalized abdominal pain was also pertinent to this visit. has a past medical history of Cerebral artery occlusion with cerebral infarction ([facility] [address] Diabetes mellitus ([facility] [address] and Hypertension.    has a past surgical history that includes Colonoscopy (N/A, 2021) and Colonoscopy (N/A, 2021). Restrictions  Restrictions/Precautions  Restrictions/Precautions: Fall Risk  Position Activity Restriction  Other position/activity restrictions: H/o CVA w/ right side weakness     Subjective   General  Chart Reviewed: Yes, Progress Notes  Patient assessed for rehabilitation services?: Yes (Simultaneous filing. User may not have seen previous data.)  Additional Pertinent Hx: per H&P: \"Patient is a 66-year-old male with past medical history of diabetes mellitus who presented to hospital for abdominal pain. According to patient he has generalized abdominal pain, has been diarrhea for 3 to 4 days it has been getting worse, he has associated loose bowels but no nausea or vomiting, he has not noticed if he is passing gas. He mentions his abdominal pain is 10/10 intensity, nonradiating, no aggravating or alleviating factors. Otherwise denied fevers chills chest pain shortness of breath\"  Family / Caregiver Present: No (Brother initially present but left as soon therapy entered)  Referring Practitioner: Terry  Diagnosis: Abdominal pain  Subjective  Subjective: Pt met b/s for OT eval w/ PT. Pt in bed, agreeable to therapy and denied pain  General Comment  Comments: Per RN ok to see      Orientation  Orientation  Overall Orientation Status: Within Functional Limits  Objective             Balance  Sitting Balance: Stand by assistance  Standing Balance: Moderate assistance  Functional Mobility  Functional Mobility Comments: Pt is non-ambulatory  Bed mobility  Supine to Sit: Moderate assistance  Sit to Supine: Moderate assistance  Scootin Person assistance (up into bed/to comfort at end of session.)  Transfers  Sit to stand: Minimal assistance; Moderate assistance; 2 Person assistance  Stand to sit: Minimal assistance; Moderate assistance; 2 Person assistance  Transfer Comments: Pt unable to come to complete stand and unable to take step towards HOB.         Cognition  Overall Cognitive Status: Exceptions  Arousal/Alertness: Appropriate responses to stimuli  Following Commands:  Follows one step commands consistently  Attention Span: Appears intact  Safety Judgement: Decreased awareness of need for safety; Decreased awareness of need for assistance  Problem Solving: Decreased awareness of errors  Insights: Decreased awareness of deficits  Initiation: Requires cues for some  Sequencing: Requires cues for some  Cognition Comment: May be questionable historian        Plan   Plan  Times per week: 3-5  Times per day: Daily  Current Treatment Recommendations: Strengthening, Functional Mobility Training, Endurance Training, ROM, Balance Training, Safety Education & Training, Self-Care / ADL, Equipment Evaluation, Education, & procurement, Patient/Caregiver Education & Training    AM-PAC Score        AM-[facility] Inpatient Daily Activity Raw Score: 12 (12/14/21 1245)  AM-PAC Inpatient ADL T-Scale Score : 30.6 (12/14/21 1245)  ADL Inpatient CMS 0-100% Score: 66.57 (12/14/21 1245)  ADL Inpatient CMS G-Code Modifier : CL (12/14/21 1245)    Goals  Short term goals  Time Frame for Short term goals: Prior to d/c  Short term goal 1: Pt will complete bed mobility w/ SBA  Short term goal 2: Pt will complete fxl transfers w/ CGA via stand/squat pivot  Short term goal 3: Pt will groom w/ setup  Short term goal 4: Pt will toilet w/ CGA  Long term goals  Time Frame for Long term goals : LTG=STG  Patient Goals   Patient goals : to go home       Therapy Time   Individual Concurrent Group Co-treatment   Time In       1200   Time Out       1240   Minutes       40   Timed Code Treatment Minutes: [facility],    Electronically signed by ADELITA Pedraza/L  License # [id number]

## 2021-12-15 ENCOUNTER — ANESTHESIA (OUTPATIENT)
Dept: OPERATING ROOM | Age: 76
DRG: 330 | End: 2021-12-15
Payer: MEDICARE

## 2021-12-15 VITALS
RESPIRATION RATE: 2 BRPM | SYSTOLIC BLOOD PRESSURE: 156 MMHG | DIASTOLIC BLOOD PRESSURE: 74 MMHG | OXYGEN SATURATION: 96 % | TEMPERATURE: 95.9 F

## 2021-12-15 LAB
ALBUMIN SERPL-MCNC: 3.1 G/DL (ref 3.4–5)
ANION GAP SERPL CALCULATED.3IONS-SCNC: 17 MMOL/L (ref 3–16)
APTT: 68.5 SEC (ref 26.2–38.6)
BUN BLDV-MCNC: 29 MG/DL (ref 7–20)
CALCIUM SERPL-MCNC: 8.5 MG/DL (ref 8.3–10.6)
CHLORIDE BLD-SCNC: 111 MMOL/L (ref 99–110)
CO2: 14 MMOL/L (ref 21–32)
CREAT SERPL-MCNC: 2.2 MG/DL (ref 0.8–1.3)
GFR AFRICAN AMERICAN: 35
GFR NON-AFRICAN AMERICAN: 29
GLUCOSE BLD-MCNC: 118 MG/DL (ref 70–99)
GLUCOSE BLD-MCNC: 128 MG/DL (ref 70–99)
GLUCOSE BLD-MCNC: 270 MG/DL (ref 70–99)
GLUCOSE BLD-MCNC: 66 MG/DL (ref 70–99)
GLUCOSE BLD-MCNC: 70 MG/DL (ref 70–99)
GLUCOSE BLD-MCNC: 70 MG/DL (ref 70–99)
GLUCOSE BLD-MCNC: 76 MG/DL (ref 70–99)
GLUCOSE BLD-MCNC: 77 MG/DL (ref 70–99)
HCT VFR BLD CALC: 27.4 % (ref 40.5–52.5)
HEMOGLOBIN: 9 G/DL (ref 13.5–17.5)
MAGNESIUM: 1.8 MG/DL (ref 1.8–2.4)
MCH RBC QN AUTO: 29.9 PG (ref 26–34)
MCHC RBC AUTO-ENTMCNC: 32.7 G/DL (ref 31–36)
MCV RBC AUTO: 91.5 FL (ref 80–100)
PDW BLD-RTO: 15.9 % (ref 12.4–15.4)
PERFORMED ON: ABNORMAL
PERFORMED ON: NORMAL
PHOSPHORUS: 4.3 MG/DL (ref 2.5–4.9)
PLATELET # BLD: 160 K/UL (ref 135–450)
PMV BLD AUTO: 9 FL (ref 5–10.5)
POTASSIUM REFLEX MAGNESIUM: 3.1 MMOL/L (ref 3.5–5.1)
POTASSIUM SERPL-SCNC: ABNORMAL MMOL/L (ref 3.5–5.1)
RBC # BLD: 3 M/UL (ref 4.2–5.9)
SODIUM BLD-SCNC: 142 MMOL/L (ref 136–145)
WBC # BLD: 5.5 K/UL (ref 4–11)

## 2021-12-15 PROCEDURE — 36415 COLL VENOUS BLD VENIPUNCTURE: CPT

## 2021-12-15 PROCEDURE — 2580000003 HC RX 258: Performed by: INTERNAL MEDICINE

## 2021-12-15 PROCEDURE — 6360000002 HC RX W HCPCS: Performed by: SURGERY

## 2021-12-15 PROCEDURE — 2500000003 HC RX 250 WO HCPCS: Performed by: SURGERY

## 2021-12-15 PROCEDURE — 6360000002 HC RX W HCPCS: Performed by: INTERNAL MEDICINE

## 2021-12-15 PROCEDURE — 2580000003 HC RX 258: Performed by: SURGERY

## 2021-12-15 PROCEDURE — 2580000003 HC RX 258: Performed by: NURSE ANESTHETIST, CERTIFIED REGISTERED

## 2021-12-15 PROCEDURE — 0DTN0ZZ RESECTION OF SIGMOID COLON, OPEN APPROACH: ICD-10-PCS | Performed by: SURGERY

## 2021-12-15 PROCEDURE — 3700000001 HC ADD 15 MINUTES (ANESTHESIA): Performed by: SURGERY

## 2021-12-15 PROCEDURE — 85027 COMPLETE CBC AUTOMATED: CPT

## 2021-12-15 PROCEDURE — 2500000003 HC RX 250 WO HCPCS: Performed by: INTERNAL MEDICINE

## 2021-12-15 PROCEDURE — 2720000010 HC SURG SUPPLY STERILE: Performed by: SURGERY

## 2021-12-15 PROCEDURE — 94760 N-INVAS EAR/PLS OXIMETRY 1: CPT

## 2021-12-15 PROCEDURE — C9113 INJ PANTOPRAZOLE SODIUM, VIA: HCPCS | Performed by: HOSPITALIST

## 2021-12-15 PROCEDURE — 3600000014 HC SURGERY LEVEL 4 ADDTL 15MIN: Performed by: SURGERY

## 2021-12-15 PROCEDURE — 6370000000 HC RX 637 (ALT 250 FOR IP): Performed by: SURGERY

## 2021-12-15 PROCEDURE — 2500000003 HC RX 250 WO HCPCS: Performed by: STUDENT IN AN ORGANIZED HEALTH CARE EDUCATION/TRAINING PROGRAM

## 2021-12-15 PROCEDURE — 6360000002 HC RX W HCPCS: Performed by: NURSE ANESTHETIST, CERTIFIED REGISTERED

## 2021-12-15 PROCEDURE — 88307 TISSUE EXAM BY PATHOLOGIST: CPT

## 2021-12-15 PROCEDURE — 2500000003 HC RX 250 WO HCPCS: Performed by: NURSE ANESTHETIST, CERTIFIED REGISTERED

## 2021-12-15 PROCEDURE — 7100000000 HC PACU RECOVERY - FIRST 15 MIN: Performed by: SURGERY

## 2021-12-15 PROCEDURE — 2060000000 HC ICU INTERMEDIATE R&B

## 2021-12-15 PROCEDURE — 3700000000 HC ANESTHESIA ATTENDED CARE: Performed by: SURGERY

## 2021-12-15 PROCEDURE — 9990000010 HC NO CHARGE VISIT

## 2021-12-15 PROCEDURE — 2580000003 HC RX 258: Performed by: HOSPITALIST

## 2021-12-15 PROCEDURE — 80069 RENAL FUNCTION PANEL: CPT

## 2021-12-15 PROCEDURE — 3600000004 HC SURGERY LEVEL 4 BASE: Performed by: SURGERY

## 2021-12-15 PROCEDURE — 2709999900 HC NON-CHARGEABLE SUPPLY: Performed by: SURGERY

## 2021-12-15 PROCEDURE — 6360000002 HC RX W HCPCS: Performed by: HOSPITALIST

## 2021-12-15 PROCEDURE — 44140 PARTIAL REMOVAL OF COLON: CPT | Performed by: SURGERY

## 2021-12-15 PROCEDURE — 85730 THROMBOPLASTIN TIME PARTIAL: CPT

## 2021-12-15 PROCEDURE — 83735 ASSAY OF MAGNESIUM: CPT

## 2021-12-15 PROCEDURE — 7100000001 HC PACU RECOVERY - ADDTL 15 MIN: Performed by: SURGERY

## 2021-12-15 RX ORDER — SODIUM CHLORIDE 0.9 % (FLUSH) 0.9 %
5-40 SYRINGE (ML) INJECTION PRN
Status: DISCONTINUED | OUTPATIENT
Start: 2021-12-15 | End: 2021-12-15

## 2021-12-15 RX ORDER — FENTANYL CITRATE 50 UG/ML
INJECTION, SOLUTION INTRAMUSCULAR; INTRAVENOUS PRN
Status: DISCONTINUED | OUTPATIENT
Start: 2021-12-15 | End: 2021-12-15 | Stop reason: SDUPTHER

## 2021-12-15 RX ORDER — DEXAMETHASONE SODIUM PHOSPHATE 4 MG/ML
INJECTION, SOLUTION INTRA-ARTICULAR; INTRALESIONAL; INTRAMUSCULAR; INTRAVENOUS; SOFT TISSUE PRN
Status: DISCONTINUED | OUTPATIENT
Start: 2021-12-15 | End: 2021-12-15 | Stop reason: SDUPTHER

## 2021-12-15 RX ORDER — LIDOCAINE HYDROCHLORIDE 20 MG/ML
INJECTION, SOLUTION EPIDURAL; INFILTRATION; INTRACAUDAL; PERINEURAL PRN
Status: DISCONTINUED | OUTPATIENT
Start: 2021-12-15 | End: 2021-12-15 | Stop reason: SDUPTHER

## 2021-12-15 RX ORDER — SODIUM CHLORIDE 0.9 % (FLUSH) 0.9 %
5-40 SYRINGE (ML) INJECTION EVERY 12 HOURS SCHEDULED
Status: DISCONTINUED | OUTPATIENT
Start: 2021-12-15 | End: 2021-12-15

## 2021-12-15 RX ORDER — MORPHINE SULFATE 2 MG/ML
2 INJECTION, SOLUTION INTRAMUSCULAR; INTRAVENOUS
Status: DISCONTINUED | OUTPATIENT
Start: 2021-12-15 | End: 2021-12-16

## 2021-12-15 RX ORDER — BUPIVACAINE HYDROCHLORIDE 5 MG/ML
INJECTION, SOLUTION EPIDURAL; INTRACAUDAL
Status: COMPLETED | OUTPATIENT
Start: 2021-12-15 | End: 2021-12-15

## 2021-12-15 RX ORDER — MORPHINE SULFATE 4 MG/ML
4 INJECTION, SOLUTION INTRAMUSCULAR; INTRAVENOUS
Status: DISCONTINUED | OUTPATIENT
Start: 2021-12-15 | End: 2021-12-16

## 2021-12-15 RX ORDER — ROCURONIUM BROMIDE 10 MG/ML
INJECTION, SOLUTION INTRAVENOUS PRN
Status: DISCONTINUED | OUTPATIENT
Start: 2021-12-15 | End: 2021-12-15 | Stop reason: SDUPTHER

## 2021-12-15 RX ORDER — PROPOFOL 10 MG/ML
INJECTION, EMULSION INTRAVENOUS PRN
Status: DISCONTINUED | OUTPATIENT
Start: 2021-12-15 | End: 2021-12-15 | Stop reason: SDUPTHER

## 2021-12-15 RX ORDER — CIPROFLOXACIN 2 MG/ML
400 INJECTION, SOLUTION INTRAVENOUS ONCE
Status: COMPLETED | OUTPATIENT
Start: 2021-12-15 | End: 2021-12-15

## 2021-12-15 RX ORDER — EPHEDRINE SULFATE/0.9% NACL/PF 50 MG/5 ML
SYRINGE (ML) INTRAVENOUS PRN
Status: DISCONTINUED | OUTPATIENT
Start: 2021-12-15 | End: 2021-12-15 | Stop reason: SDUPTHER

## 2021-12-15 RX ORDER — SODIUM CHLORIDE 9 MG/ML
INJECTION, SOLUTION INTRAVENOUS CONTINUOUS
Status: DISCONTINUED | OUTPATIENT
Start: 2021-12-15 | End: 2021-12-15

## 2021-12-15 RX ORDER — SODIUM CHLORIDE 9 MG/ML
25 INJECTION, SOLUTION INTRAVENOUS PRN
Status: DISCONTINUED | OUTPATIENT
Start: 2021-12-15 | End: 2021-12-15

## 2021-12-15 RX ORDER — MAGNESIUM HYDROXIDE 1200 MG/15ML
LIQUID ORAL CONTINUOUS PRN
Status: COMPLETED | OUTPATIENT
Start: 2021-12-15 | End: 2021-12-15

## 2021-12-15 RX ORDER — DEXTROSE MONOHYDRATE 25 G/50ML
12.5 INJECTION, SOLUTION INTRAVENOUS ONCE
Status: COMPLETED | OUTPATIENT
Start: 2021-12-15 | End: 2021-12-15

## 2021-12-15 RX ORDER — LABETALOL HYDROCHLORIDE 5 MG/ML
INJECTION, SOLUTION INTRAVENOUS PRN
Status: DISCONTINUED | OUTPATIENT
Start: 2021-12-15 | End: 2021-12-15 | Stop reason: SDUPTHER

## 2021-12-15 RX ADMIN — INSULIN LISPRO 6 UNITS: 100 INJECTION, SOLUTION INTRAVENOUS; SUBCUTANEOUS at 22:41

## 2021-12-15 RX ADMIN — ROCURONIUM BROMIDE 40 MG: 10 INJECTION INTRAVENOUS at 13:24

## 2021-12-15 RX ADMIN — LABETALOL HYDROCHLORIDE 7.5 MG: 5 INJECTION, SOLUTION INTRAVENOUS at 13:50

## 2021-12-15 RX ADMIN — PANTOPRAZOLE SODIUM 40 MG: 40 INJECTION, POWDER, FOR SOLUTION INTRAVENOUS at 08:01

## 2021-12-15 RX ADMIN — LABETALOL HYDROCHLORIDE 10 MG: 5 INJECTION INTRAVENOUS at 05:07

## 2021-12-15 RX ADMIN — POTASSIUM CHLORIDE 10 MEQ: 7.46 INJECTION, SOLUTION INTRAVENOUS at 16:37

## 2021-12-15 RX ADMIN — CIPROFLOXACIN 400 MG: 2 INJECTION, SOLUTION INTRAVENOUS at 13:26

## 2021-12-15 RX ADMIN — MORPHINE SULFATE 2 MG: 2 INJECTION, SOLUTION INTRAMUSCULAR; INTRAVENOUS at 18:09

## 2021-12-15 RX ADMIN — DEXAMETHASONE SODIUM PHOSPHATE 4 MG: 4 INJECTION, SOLUTION INTRAMUSCULAR; INTRAVENOUS at 13:36

## 2021-12-15 RX ADMIN — POTASSIUM CHLORIDE 10 MEQ: 7.46 INJECTION, SOLUTION INTRAVENOUS at 09:45

## 2021-12-15 RX ADMIN — DEXTROSE MONOHYDRATE 12.5 G: 25 INJECTION, SOLUTION INTRAVENOUS at 13:05

## 2021-12-15 RX ADMIN — PHENYLEPHRINE HYDROCHLORIDE 50 MCG: 10 INJECTION INTRAVENOUS at 14:37

## 2021-12-15 RX ADMIN — SUGAMMADEX 200 MG: 100 INJECTION, SOLUTION INTRAVENOUS at 15:06

## 2021-12-15 RX ADMIN — FENTANYL CITRATE 50 MCG: 50 INJECTION INTRAMUSCULAR; INTRAVENOUS at 13:22

## 2021-12-15 RX ADMIN — SODIUM CHLORIDE, PRESERVATIVE FREE 10 ML: 5 INJECTION INTRAVENOUS at 08:01

## 2021-12-15 RX ADMIN — ROCURONIUM BROMIDE 10 MG: 10 INJECTION INTRAVENOUS at 14:09

## 2021-12-15 RX ADMIN — SODIUM CHLORIDE: 9 INJECTION, SOLUTION INTRAVENOUS at 16:37

## 2021-12-15 RX ADMIN — Medication 10 ML: at 08:01

## 2021-12-15 RX ADMIN — PROPOFOL 75 MG: 10 INJECTION, EMULSION INTRAVENOUS at 13:43

## 2021-12-15 RX ADMIN — METRONIDAZOLE 500 MG: 500 INJECTION, SOLUTION INTRAVENOUS at 13:30

## 2021-12-15 RX ADMIN — Medication 1000 UNITS/HR: at 06:05

## 2021-12-15 RX ADMIN — CIPROFLOXACIN 400 MG: 2 INJECTION, SOLUTION INTRAVENOUS at 12:36

## 2021-12-15 RX ADMIN — Medication 10 MG: at 15:04

## 2021-12-15 RX ADMIN — LIDOCAINE HYDROCHLORIDE 50 MG: 20 INJECTION, SOLUTION EPIDURAL; INFILTRATION; INTRACAUDAL; PERINEURAL at 13:23

## 2021-12-15 RX ADMIN — FENTANYL CITRATE 50 MCG: 50 INJECTION INTRAMUSCULAR; INTRAVENOUS at 13:43

## 2021-12-15 RX ADMIN — SODIUM CHLORIDE, PRESERVATIVE FREE 10 ML: 5 INJECTION INTRAVENOUS at 22:14

## 2021-12-15 RX ADMIN — POTASSIUM CHLORIDE 10 MEQ: 7.46 INJECTION, SOLUTION INTRAVENOUS at 08:05

## 2021-12-15 RX ADMIN — PROPOFOL 100 MG: 10 INJECTION, EMULSION INTRAVENOUS at 13:24

## 2021-12-15 RX ADMIN — HYDRALAZINE HYDROCHLORIDE 10 MG: 20 INJECTION INTRAMUSCULAR; INTRAVENOUS at 17:30

## 2021-12-15 ASSESSMENT — PULMONARY FUNCTION TESTS
PIF_VALUE: 13
PIF_VALUE: 14
PIF_VALUE: 1
PIF_VALUE: 12
PIF_VALUE: 15
PIF_VALUE: 14
PIF_VALUE: 13
PIF_VALUE: 14
PIF_VALUE: 15
PIF_VALUE: 14
PIF_VALUE: 15
PIF_VALUE: 16
PIF_VALUE: 14
PIF_VALUE: 13
PIF_VALUE: 14
PIF_VALUE: 2
PIF_VALUE: 14
PIF_VALUE: 15
PIF_VALUE: 12
PIF_VALUE: 15
PIF_VALUE: 16
PIF_VALUE: 14
PIF_VALUE: 13
PIF_VALUE: 1
PIF_VALUE: 14
PIF_VALUE: 3
PIF_VALUE: 14
PIF_VALUE: 16
PIF_VALUE: 13
PIF_VALUE: 16
PIF_VALUE: 14
PIF_VALUE: 18
PIF_VALUE: 15
PIF_VALUE: 15
PIF_VALUE: 2
PIF_VALUE: 14
PIF_VALUE: 14
PIF_VALUE: 13
PIF_VALUE: 14
PIF_VALUE: 14
PIF_VALUE: 1
PIF_VALUE: 14
PIF_VALUE: 12
PIF_VALUE: 14
PIF_VALUE: 14
PIF_VALUE: 13
PIF_VALUE: 13
PIF_VALUE: 14
PIF_VALUE: 14
PIF_VALUE: 13
PIF_VALUE: 15
PIF_VALUE: 15
PIF_VALUE: 14
PIF_VALUE: 13
PIF_VALUE: 15
PIF_VALUE: 13
PIF_VALUE: 14
PIF_VALUE: 17
PIF_VALUE: 14
PIF_VALUE: 16
PIF_VALUE: 1
PIF_VALUE: 16
PIF_VALUE: 15
PIF_VALUE: 15
PIF_VALUE: 14
PIF_VALUE: 7
PIF_VALUE: 15
PIF_VALUE: 15
PIF_VALUE: 14
PIF_VALUE: 13
PIF_VALUE: 14
PIF_VALUE: 14
PIF_VALUE: 17
PIF_VALUE: 14
PIF_VALUE: 15
PIF_VALUE: 9
PIF_VALUE: 14
PIF_VALUE: 14
PIF_VALUE: 16
PIF_VALUE: 14
PIF_VALUE: 16
PIF_VALUE: 13

## 2021-12-15 ASSESSMENT — PAIN SCALES - WONG BAKER
WONGBAKER_NUMERICALRESPONSE: 0

## 2021-12-15 ASSESSMENT — PAIN SCALES - GENERAL: PAINLEVEL_OUTOF10: 6

## 2021-12-15 ASSESSMENT — LIFESTYLE VARIABLES: SMOKING_STATUS: 0

## 2021-12-15 ASSESSMENT — PAIN - FUNCTIONAL ASSESSMENT: PAIN_FUNCTIONAL_ASSESSMENT: 0-10

## 2021-12-15 NOTE — ANESTHESIA POSTPROCEDURE EVALUATION
Department of Anesthesiology  Postprocedure Note    Patient: Colten Room  MRN: [id number]  YOB: 1945  Date of evaluation: 12/15/2021  Time:  3:33 PM     Procedure Summary     Date: 12/15/21 Room / Location: [facility] P[facility]ush Whittaker [address] [place] [address]    Anesthesia Start: 7028 Anesthesia Stop: 0782    Procedure: SIGMOID COLECTOMY (N/A Abdomen) Diagnosis:       Volvulus ([address]      (VOLVULUS)    Surgeons: Ramone West MD Responsible Provider: Irene Guallpa MD    Anesthesia Type: general ASA Status: 3          Anesthesia Type: general    Micahel Phase I: Mcihael Score: 8    Michael Phase II:      Last vitals: Reviewed and per EMR flowsheets. Anesthesia Post Evaluation    Patient location during evaluation: PACU  Patient participation: complete - patient participated  Level of consciousness: sleepy but conscious  Airway patency: patent  Nausea & Vomiting: no nausea and no vomiting  Complications: no  Cardiovascular status: hemodynamically stable and blood pressure returned to baseline  Respiratory status: spontaneous ventilation, nonlabored ventilation and room air  Hydration status: stable  Comments: Mr. Edilma Escobar resting comfortably following procedure. Denies any significant pain or nausea at this time. Anticipate return to floor when room is ready.

## 2021-12-15 NOTE — CARE COORDINATION
INITIAL CASE MANAGEMENT ASSESSMENT     Reviewed chart, spoke with patient to assess possible discharge needs. Explained Case Management role/services. Living Situation: Verified demographics. Patient lives in a house with ramped entry. ADLs: Independent prior to admission. DME: wheel chair    PT/OT Recs: SNF      Active Services: home health aide - private duty 1 time per week     Transportation: Likely need medical transport to SNF      Medications: Need to verify     PCP: OBI Mckinnon CNP      HD/PD: n/a    PLAN/COMMENTS: [address]   ACP and readmission assessment completed. 1) Follow up with Jovani ([phone]) in admissions at [address] for Transition of Care is related to the following treatment goals: SNF    The Patient was provided with a choice of provider and agrees   with the discharge plan. [x] Yes [] No    Freedom of choice list was provided with basic dialogue that supports the patient's individualized plan of care/goals, treatment preferences and shares the quality data associated with the providers. [x] Yes [] No      SW/CM provided contact information for patient or family to call with any questions. SW/CM will follow and assist as needed.     ADITYA Iverson, [place], Social Work  [phone]  Electronically signed by ADITYA Iverson, LSW on 12/15/2021 at 10:01 AM

## 2021-12-15 NOTE — PROGRESS NOTES
Pt back from sigmoid colectomy. Dressing clean, dry, and intact. Pt sleepy but awakens to voice. Vitals obtained. /72  With a HR of 45. Pain is controled. RN unable to give labetalol for BP due to low HR. And pt does not meet admin instructions for PRN IV Hydralazine order. Call out to [facility]- See Admin instructions updated for PRN IV Hydralazine. PRN Hydralazine administered by RN. 1800- /72, HR 51.       Electronically signed by Kurt Lomeli RN on 12/15/2021 at 6:31 PM

## 2021-12-15 NOTE — PROGRESS NOTES
Occupational Therapy    Velfabi AnthonyViraj  [phone]  [id number]    Attempted therapy follow-up session with pt, however, after speaking with nursing, pt is NPO for surgery in approximately 1 hour per nurse. Discussed with OTR WILL HOLD therapy this date. Will need NEW THERAPYORDERS POST-OP to resume services.      Electronically signed by Jaymes Seip, OTA on 12/15/2021 at 11:48 AM       If discharged prior to next OT session please see last daily note for discharge status

## 2021-12-15 NOTE — H&P
Preoperative History and Physical Update    H&P from 12/12/2021 was reviewed    Volvulus reduced endoscopically  Eliquis on hold    PE  Alert and oriented  Mild abdominal distention    A/P  Sigmoid volvulus  For sigmoid resection today    The risks, benefits and alternatives to the planned procedure were discussed. Patient expressed an understanding and is willing to proceed.     Electronically signed by Caleb Bridges MD on 12/15/2021 at 12:39 PM

## 2021-12-15 NOTE — PROGRESS NOTES
Clinical Pharmacy Note  Heparin Dosing       Lab Results   Component Value Date    APTT 68.5 12/15/2021     Lab Results   Component Value Date    HGB 9.0 12/15/2021    HCT 27.4 12/15/2021     12/15/2021       Current Infusion Rate: 1000 units/hr    Plan:  Rate: Continue 1000 units/hr  Next aPTT: 0600  12/16/21    Pharmacy will continue to monitor and adjust based on aPTT results.     Ian Moran, [facility]  12/15/2021 7:44 AM

## 2021-12-15 NOTE — PROGRESS NOTES
Physical Therapy-attempt/HOLD   Tierra Carranza  [phone]   Attempted therapy follow-up session with pt, however, after speaking with gildag, pt is NPO for surgery in approximately 1 hour per nurse. Will HOLD therapy this date. Will need NEW THERAPYORDERS POST-OP to resume services. If patient is discharged prior to the next Physical Therapy visit, please see last written PT note for discharge status.   Electronically signed by Evaristo Jain PT on 12/15/2021 at 11:25 AM

## 2021-12-15 NOTE — ACP (ADVANCE CARE PLANNING)
Advance Care Planning     Advance Care Planning Activator (Inpatient)  Conversation Note      Date of ACP Conversation: 12/15/2021     Conversation Conducted with: Patient with Decision Making Capacity    ACP Activator: ADITYA Minaya, [place]    Health Care Decision Maker:     Current Designated Health Care Decision Maker:     Primary Decision Maker: Bhaskar Lares - [phone]    Secondary Decision Maker: Alger Lesch - Brother/Sister - [phone]    Today we documented Decision Maker(s) consistent with ACP documents on file. Care Preferences    Ventilation: \"If you were in your present state of health and suddenly became very ill and were unable to breathe on your own, what would your preference be about the use of a ventilator (breathing machine) if it were available to you? \"      Would the patient desire the use of ventilator (breathing machine)?: yes    \"If your health worsens and it becomes clear that your chance of recovery is unlikely, what would your preference be about the use of a ventilator (breathing machine) if it were available to you? \"     Would the patient desire the use of ventilator (breathing machine)?: No      Resuscitation  \"CPR works best to restart the heart when there is a sudden event, like a heart attack, in someone who is otherwise healthy. Unfortunately, CPR does not typically restart the heart for people who have serious health conditions or who are very sick. \"    \"In the event your heart stopped as a result of an underlying serious health condition, would you want attempts to be made to restart your heart (answer \"yes\" for attempt to resuscitate) or would you prefer a natural death (answer \"no\" for do not attempt to resuscitate)? \" yes       [] Yes   [x] No   Educated Patient / Samantha Terryp regarding differences between Advance Directives and portable DNR orders.     Length of ACP Conversation in minutes: 5     Conversation Outcomes:  [] ACP discussion completed  [x] Existing advance directive reviewed with patient; no changes to patient's previously recorded wishes  [] New Advance Directive completed  [] Portable Do Not Rescitate prepared for Provider review and signature  [] POLST/POST/MOLST/MOST prepared for Provider review and signature      Follow-up plan:    [] Schedule follow-up conversation to continue planning  [] Referred individual to Provider for additional questions/concerns   [] Advised patient/agent/surrogate to review completed ACP document and update if needed with changes in condition, patient preferences or care setting    [] This note routed to one or more involved healthcare providers       Electronically signed by Marcianne Mcardle, MSW, DAWSON on 12/15/2021 at 10:00 AM

## 2021-12-15 NOTE — BRIEF OP NOTE
Brief Postoperative Note      Patient: Sylvie Clemons  YOB: 1945  MRN: [id number]    Date of Procedure: 12/15/2021    Pre-Op Diagnosis: sigmoid VOLVULUS    Post-Op Diagnosis: Same       Procedure(s):  SIGMOID COLECTOMY  With anastomosis    Surgeon(s):  Randolph Pepe MD    Assistant:  Surgical Assistant: Lizy Ontiveros    Anesthesia: General    Estimated Blood Loss (mL): less than 50     Complications: None    Specimens:   ID Type Source Tests Collected by Time Destination   A : A) sigmoid colon Tissue Duodenum SURGICAL PATHOLOGY Randolph Pepe MD 12/15/2021 1353        Implants:  * No implants in log *      Drains:   Urethral Catheter Double-lumen; Non-latex; Straight-tip 16 fr (Active)       [REMOVED] External Urinary Catheter (Removed)   Urine Color Yellow 11/15/21 0600   Suction 40 mmgHg continuous 11/15/21 0600   Placement Initiated 11/15/21 0600   Skin Assessment No Injury 11/15/21 0600       Findings: dilated, elongated sigmoid colon    Electronically signed by Blossom Zayas MD on 12/15/2021 at 3:09 PM

## 2021-12-15 NOTE — FLOWSHEET NOTE
12/15/21 0844   Readmission Assessment   Number of Days since last admission? 8-30 days   Previous Disposition Home with Family   Who is being Mariangel True   (per chart review)   What was the patient's/caregiver's perception as to why they think they needed to return back to the hospital?   (abdominal pain secondary to sigmoid volvulus)   Did you visit your Primary Care Physician after you left the hospital, before you returned this time? Yes   Did you see a specialist, such as Cardiac, Pulmonary, Orthopedic Physician, etc. after you left the hospital? No   Who advised the patient to return to the hospital? Self-referral   Does the patient report anything that got in the way of taking their medications?  No   In our efforts to provide the best possible care to you and others like you, can you think of anything that we could have done to help you after you left the hospital the first time, so that you might not have needed to return so soon?   (n/a)

## 2021-12-16 LAB
ANION GAP SERPL CALCULATED.3IONS-SCNC: 13 MMOL/L (ref 3–16)
APTT: 38.1 SEC (ref 26.2–38.6)
BILIRUBIN URINE: ABNORMAL
BLOOD, URINE: ABNORMAL
BUN BLDV-MCNC: 31 MG/DL (ref 7–20)
CALCIUM SERPL-MCNC: 8.2 MG/DL (ref 8.3–10.6)
CHLORIDE BLD-SCNC: 108 MMOL/L (ref 99–110)
CLARITY: ABNORMAL
CO2: 15 MMOL/L (ref 21–32)
COLOR: YELLOW
COMMENT UA: ABNORMAL
CREAT SERPL-MCNC: 2.2 MG/DL (ref 0.8–1.3)
EPITHELIAL CELLS, UA: 4 /HPF (ref 0–5)
GFR AFRICAN AMERICAN: 35
GFR NON-AFRICAN AMERICAN: 29
GLUCOSE BLD-MCNC: 126 MG/DL (ref 70–99)
GLUCOSE BLD-MCNC: 127 MG/DL (ref 70–99)
GLUCOSE BLD-MCNC: 129 MG/DL (ref 70–99)
GLUCOSE BLD-MCNC: 146 MG/DL (ref 70–99)
GLUCOSE BLD-MCNC: 152 MG/DL (ref 70–99)
GLUCOSE BLD-MCNC: 159 MG/DL (ref 70–99)
GLUCOSE BLD-MCNC: 184 MG/DL (ref 70–99)
GLUCOSE BLD-MCNC: 188 MG/DL (ref 70–99)
GLUCOSE URINE: NEGATIVE MG/DL
HCT VFR BLD CALC: 26.7 % (ref 40.5–52.5)
HEMOGLOBIN: 8.6 G/DL (ref 13.5–17.5)
HYALINE CASTS: 10 /LPF (ref 0–8)
KETONES, URINE: NEGATIVE MG/DL
LEUKOCYTE ESTERASE, URINE: ABNORMAL
MCH RBC QN AUTO: 29.2 PG (ref 26–34)
MCHC RBC AUTO-ENTMCNC: 32.3 G/DL (ref 31–36)
MCV RBC AUTO: 90.2 FL (ref 80–100)
MICROSCOPIC EXAMINATION: YES
NITRITE, URINE: NEGATIVE
PDW BLD-RTO: 15.7 % (ref 12.4–15.4)
PERFORMED ON: ABNORMAL
PH UA: 5 (ref 5–8)
PLATELET # BLD: 162 K/UL (ref 135–450)
PMV BLD AUTO: 9.4 FL (ref 5–10.5)
POTASSIUM REFLEX MAGNESIUM: 3.6 MMOL/L (ref 3.5–5.1)
PROCALCITONIN: 0.64 NG/ML (ref 0–0.15)
PROTEIN UA: 30 MG/DL
RBC # BLD: 2.96 M/UL (ref 4.2–5.9)
RBC UA: 2 /HPF (ref 0–4)
SODIUM BLD-SCNC: 136 MMOL/L (ref 136–145)
SPECIFIC GRAVITY UA: 1.01 (ref 1–1.03)
URINE REFLEX TO CULTURE: YES
URINE TYPE: ABNORMAL
UROBILINOGEN, URINE: 1 E.U./DL
WBC # BLD: 12.3 K/UL (ref 4–11)
WBC UA: 10 /HPF (ref 0–5)

## 2021-12-16 PROCEDURE — 99024 POSTOP FOLLOW-UP VISIT: CPT | Performed by: SURGERY

## 2021-12-16 PROCEDURE — 36415 COLL VENOUS BLD VENIPUNCTURE: CPT

## 2021-12-16 PROCEDURE — C9113 INJ PANTOPRAZOLE SODIUM, VIA: HCPCS | Performed by: SURGERY

## 2021-12-16 PROCEDURE — 87086 URINE CULTURE/COLONY COUNT: CPT

## 2021-12-16 PROCEDURE — 94760 N-INVAS EAR/PLS OXIMETRY 1: CPT

## 2021-12-16 PROCEDURE — 2580000003 HC RX 258: Performed by: SURGERY

## 2021-12-16 PROCEDURE — 51798 US URINE CAPACITY MEASURE: CPT

## 2021-12-16 PROCEDURE — 6360000002 HC RX W HCPCS: Performed by: HOSPITALIST

## 2021-12-16 PROCEDURE — 6370000000 HC RX 637 (ALT 250 FOR IP): Performed by: SURGERY

## 2021-12-16 PROCEDURE — 84145 PROCALCITONIN (PCT): CPT

## 2021-12-16 PROCEDURE — 51701 INSERT BLADDER CATHETER: CPT

## 2021-12-16 PROCEDURE — 85730 THROMBOPLASTIN TIME PARTIAL: CPT

## 2021-12-16 PROCEDURE — 6360000002 HC RX W HCPCS: Performed by: SURGERY

## 2021-12-16 PROCEDURE — APPSS45 APP SPLIT SHARED TIME 31-45 MINUTES: Performed by: NURSE PRACTITIONER

## 2021-12-16 PROCEDURE — 85027 COMPLETE CBC AUTOMATED: CPT

## 2021-12-16 PROCEDURE — 80048 BASIC METABOLIC PNL TOTAL CA: CPT

## 2021-12-16 PROCEDURE — 2060000000 HC ICU INTERMEDIATE R&B

## 2021-12-16 PROCEDURE — APPNB45 APP NON BILLABLE 31-45 MINUTES: Performed by: NURSE PRACTITIONER

## 2021-12-16 PROCEDURE — 99024 POSTOP FOLLOW-UP VISIT: CPT | Performed by: NURSE PRACTITIONER

## 2021-12-16 PROCEDURE — 81001 URINALYSIS AUTO W/SCOPE: CPT

## 2021-12-16 RX ORDER — MORPHINE SULFATE 2 MG/ML
2 INJECTION, SOLUTION INTRAMUSCULAR; INTRAVENOUS
Status: DISCONTINUED | OUTPATIENT
Start: 2021-12-16 | End: 2021-12-21 | Stop reason: HOSPADM

## 2021-12-16 RX ORDER — LEVOFLOXACIN 5 MG/ML
250 INJECTION, SOLUTION INTRAVENOUS EVERY 24 HOURS
Status: DISCONTINUED | OUTPATIENT
Start: 2021-12-17 | End: 2021-12-19

## 2021-12-16 RX ORDER — LEVOFLOXACIN 5 MG/ML
500 INJECTION, SOLUTION INTRAVENOUS ONCE
Status: COMPLETED | OUTPATIENT
Start: 2021-12-16 | End: 2021-12-16

## 2021-12-16 RX ORDER — LEVOFLOXACIN 5 MG/ML
500 INJECTION, SOLUTION INTRAVENOUS EVERY 24 HOURS
Status: DISCONTINUED | OUTPATIENT
Start: 2021-12-16 | End: 2021-12-16

## 2021-12-16 RX ORDER — MORPHINE SULFATE 10 MG/ML
6 INJECTION, SOLUTION INTRAMUSCULAR; INTRAVENOUS
Status: DISCONTINUED | OUTPATIENT
Start: 2021-12-16 | End: 2021-12-21 | Stop reason: HOSPADM

## 2021-12-16 RX ADMIN — INSULIN LISPRO 2 UNITS: 100 INJECTION, SOLUTION INTRAVENOUS; SUBCUTANEOUS at 14:58

## 2021-12-16 RX ADMIN — SODIUM CHLORIDE, PRESERVATIVE FREE 10 ML: 5 INJECTION INTRAVENOUS at 09:58

## 2021-12-16 RX ADMIN — SODIUM CHLORIDE, PRESERVATIVE FREE 10 ML: 5 INJECTION INTRAVENOUS at 21:52

## 2021-12-16 RX ADMIN — SODIUM CHLORIDE: 9 INJECTION, SOLUTION INTRAVENOUS at 09:54

## 2021-12-16 RX ADMIN — INSULIN LISPRO 2 UNITS: 100 INJECTION, SOLUTION INTRAVENOUS; SUBCUTANEOUS at 02:05

## 2021-12-16 RX ADMIN — INSULIN LISPRO 2 UNITS: 100 INJECTION, SOLUTION INTRAVENOUS; SUBCUTANEOUS at 10:27

## 2021-12-16 RX ADMIN — LEVOFLOXACIN 500 MG: 500 INJECTION, SOLUTION INTRAVENOUS at 17:52

## 2021-12-16 RX ADMIN — Medication 10 ML: at 09:55

## 2021-12-16 RX ADMIN — PANTOPRAZOLE SODIUM 40 MG: 40 INJECTION, POWDER, FOR SOLUTION INTRAVENOUS at 09:54

## 2021-12-16 RX ADMIN — SODIUM CHLORIDE: 9 INJECTION, SOLUTION INTRAVENOUS at 17:48

## 2021-12-16 RX ADMIN — SODIUM CHLORIDE: 9 INJECTION, SOLUTION INTRAVENOUS at 21:52

## 2021-12-16 RX ADMIN — MORPHINE SULFATE 2 MG: 2 INJECTION, SOLUTION INTRAMUSCULAR; INTRAVENOUS at 23:39

## 2021-12-16 RX ADMIN — SODIUM CHLORIDE: 9 INJECTION, SOLUTION INTRAVENOUS at 01:30

## 2021-12-16 ASSESSMENT — PAIN - FUNCTIONAL ASSESSMENT: PAIN_FUNCTIONAL_ASSESSMENT: PREVENTS OR INTERFERES SOME ACTIVE ACTIVITIES AND ADLS

## 2021-12-16 ASSESSMENT — PAIN SCALES - GENERAL
PAINLEVEL_OUTOF10: 0
PAINLEVEL_OUTOF10: 0
PAINLEVEL_OUTOF10: 6
PAINLEVEL_OUTOF10: 0

## 2021-12-16 ASSESSMENT — PAIN DESCRIPTION - PROGRESSION: CLINICAL_PROGRESSION: NOT CHANGED

## 2021-12-16 ASSESSMENT — PAIN DESCRIPTION - LOCATION: LOCATION: ABDOMEN

## 2021-12-16 ASSESSMENT — PAIN DESCRIPTION - ONSET: ONSET: ON-GOING

## 2021-12-16 ASSESSMENT — PAIN DESCRIPTION - ORIENTATION: ORIENTATION: MID

## 2021-12-16 ASSESSMENT — PAIN DESCRIPTION - PAIN TYPE: TYPE: SURGICAL PAIN

## 2021-12-16 ASSESSMENT — PAIN DESCRIPTION - DESCRIPTORS: DESCRIPTORS: CRAMPING

## 2021-12-16 ASSESSMENT — PAIN DESCRIPTION - FREQUENCY: FREQUENCY: INTERMITTENT

## 2021-12-16 NOTE — PROGRESS NOTES
Patient has not voided since being straight cathed at 1230. This nurse bladder scanned pt for 64ml. Perfect serve message sent to Dr. Rosalina Novoa asking if wants pt to be straight cathed for bladder scan greater than 250ml. Waiting for return message.

## 2021-12-16 NOTE — PROGRESS NOTES
Clinical Pharmacy Note  Renal Dose Adjustment    Candis July is receiving Levaquin. This medication is renally eliminated. Based on the patient's Estimated Creatinine Clearance: 32 mL/min (A) (based on SCr of 2.2 mg/dL (H)). and urine output, the dose has been adjusted to 500 mg times on dose, then 250 mg daily per protocol. Pharmacy will continue to monitor and adjust dose as needed for changes in renal function.        Bart Espinosa RPh, PRS 12/16/2021  5:22 PM

## 2021-12-16 NOTE — PLAN OF CARE
Problem: Falls - Risk of:  Goal: Will remain free from falls  Description: Will remain free from falls  12/16/2021 1835 by Rafa Obando RN  Outcome: Ongoing  Note: Fall risk assessment completed every shift. All precautions in place. Pt has call light within reach at all times. Room clear of clutter. Pt aware to call for assistance when getting up. Bed alarm in use. Pt has made no attempts to get out of bed on his own. Problem: Falls - Risk of:  Goal: Absence of physical injury  Description: Absence of physical injury  12/16/2021 1835 by Rafa Obando RN  Outcome: Ongoing  Note: No signs of physical injury. Problem: Skin Integrity:  Goal: Will show no infection signs and symptoms  Description: Will show no infection signs and symptoms  12/16/2021 1835 by Rafa Obando RN  Outcome: Ongoing  Note: Skin assessment completed every shift. Pt assessed for incontinence, appropriate barrier cream applied prn. Pt encouraged to turn/rotate every 2 hours. Assistance provided if pt unable to do so themselves. Pt needs assistance to turn and repositioned. Problem: Skin Integrity:  Goal: Absence of new skin breakdown  Description: Absence of new skin breakdown  12/16/2021 1835 by Rafa Obando RN  Outcome: Ongoing  Note: No areas of new skin breakdown noted. Problem: Nutrition  Goal: Optimal nutrition therapy  12/16/2021 1835 by Rafa Obando RN  Outcome: Ongoing  Note: Appetite is fair. Pt is currently on clear liquids. He ate a small amount for all meals.

## 2021-12-16 NOTE — PROGRESS NOTES
[facility] General Surgery                                   Daily Progress Note                                                         Pt Name: Harish Nugent  Medical Record Number: [id number]  Date of Birth 1945   Today's Date: 12/16/2021  Chief Complaint   Patient presents with    Abdominal Pain        ASSESSMENT/PLAN  Recurring sigmoid volvulus  -Postop day 1 12/15/2021 sigmoid colectomy with anastomosis  -Denies flatus or BM, await return of GI function  -Clear liquids as tolerated  -Cough and deep breathe, I-S  -An removed early this morning, incontinent at baseline. Straight cathed recently per primary team for urinalysis. -Resume PT OT          SUBJECTIVE  Yue Jewell is resting in bed, no acute distress. Pain controlled. Denies GI function. OBJECTIVE  VITALS:  height is 5' 9\" (1.753 m) and weight is 212 lb 1.3 oz (96.2 kg). His oral temperature is 97.9 °F (36.6 °C). His blood pressure is 112/46 (abnormal) and his pulse is 56. His respiration is 18 and oxygen saturation is 97%. INTAKE/OUTPUT:      Intake/Output Summary (Last 24 hours) at 12/16/2021 1455  Last data filed at 12/16/2021 1205  Gross per 24 hour   Intake 3829.99 ml   Output 1150 ml   Net 2679.99 ml     GENERAL: alert, cooperative, no distress  LUNGS: clear to ausculation, without wheezes, rales or rhonci  HEART: normal rate and regular rhythm  ABDOMEN: Soft, appropriately tender, non distended. Incision dressing intact, shadow drainage noted. EXTREMITY: no cyanosis, clubbing or edema    I/O last 3 completed shifts:   In: 5853 [P.O.:1440; I.V.:1396.1; IV Piggyback:601.9]  Out: 1300 [Urine:1200; Blood:100]      LABS  Recent Labs     12/15/21  0424 12/15/21  0424 12/16/21  0423 12/16/21  1205   WBC 5.5   < > 12.3*  --    HGB 9.0*   < > 8.6*  --    HCT 27.4*   < > 26.7*  --       < > 162  --       < > 136  --    K see below  3.1*   < > 3.6  --    *   < > 108  --    CO2 14*   < > 15*  --    BUN 29*   < > 31*  --    CREATININE 2.2*   < > 2.2*  --    MG 1.80  --   --   --    PHOS 4.3  --   --   --    CALCIUM 8.5   < > 8.2*  --    NITRU  --   --   --  Negative   COLORU  --   --   --  YELLOW    < > = values in this interval not displayed. EDUCATION  Patient educated about Disease Process, Medications, Smoking Cessation, Oxygenation, Incentive Spirometry and Deep Breath and Cough, Diabetes, Hyperlipidemia, Smoking Cessation, Nutrition, Exercise and Hypertension    Electronically signed by OBI Kang CNP on 12/16/2021 at 2:55 PM      [facility]   [phone] Office  [phone] Fax     As above  POD 1 from sigmoid colectomy  Stable so far  Await return of GI function  Increase activity, likely needs PT/OT.  Reassess in AM    Electronically signed by Austin Melgoza MD on 12/16/2021 at 4:25 PM

## 2021-12-16 NOTE — PROGRESS NOTES
Order noted for urinalysis, pt's potts was removed prior to this nurses arrival, pt is incontinent of urine. Perfect serve message sent to Dr. Bonner Hodgkins asking if pt can be straight cathed for specimen. Waiting for response.

## 2021-12-16 NOTE — OP NOTE
[address] [place] [address]                                OPERATIVE REPORT    PATIENT NAME: Adrian Arredondo                     :        1945  MED REC NO:   [id number]                          ROOM:       5107  ACCOUNT NO:   [de-identified]                           ADMIT DATE: 2021  PROVIDER:     Kelly Gallagher MD      DATE OF PROCEDURE:  12/15/2021    PREOPERATIVE DIAGNOSIS:  Recurring sigmoid volvulus. POSTOPERATIVE DIAGNOSIS:  Recurring sigmoid volvulus. PROCEDURE PERFORMED:  Sigmoid colectomy with anastomosis. SURGEON:  Kelly Gallagher MD    SPECIMEN:  Sigmoid colon. COMPLICATIONS:  None. ESTIMATED BLOOD LOSS:  Less than 50 mL. DISPOSITION:  To recovery in stable condition. INDICATION:  The patient is a 43-year-old male who has been admitted  twice in the last 4 weeks with sigmoid volvulus. Both episodes required  endoscopic decompression and his symptoms improved. He is on long-term  anticoagulation and after discussion and conclusion that this was likely  to recur, he has agreed to proceed with urgent but elective colectomy  today. His antiplatelet therapy has been on hold, and he has been  placed on a heparin drip. That was held preoperatively. The risks,  benefits, and alternatives of the intervention, especially his elevated  risk of bleeding and wound healing and anastomotic healing risks were  discussed and he agreed to proceed. PROCEDURE:  The patient was brought to the operating room, placed  supine, general anesthesia and intubation were performed, a An  catheter placed and the abdomen prepped and draped in a sterile fashion. An lower midline incision was made and dissection carried into the  peritoneum. A significantly dilated and redundant sigmoid colon was  immediately encountered and this was eviscerated.   It had returned to  its volvulus position but does not appear to

## 2021-12-16 NOTE — PROGRESS NOTES
Hospitalist Progress Note      PCP: Jd Nichols APRN - CNP    Date of Admission: 12/12/2021    Chief Complaint: abdominal pain    Hospital Course: 76m admitted with recurrence of abdominal pain secondary to sigmoid volvulus    Subjective: Patient seen on room air, no distress, denies fever cough, complaining of pain medication inadequate in strength/duration      Medications:  Reviewed    Infusion Medications    sodium chloride      sodium chloride 125 mL/hr at 12/16/21 0954    [Held by provider] heparin (PORCINE) Infusion Stopped (12/15/21 0946)    dextrose       Scheduled Medications    cloNIDine  1 patch TransDERmal Weekly    pantoprazole  40 mg IntraVENous Daily    And    sodium chloride (PF)  10 mL IntraVENous Daily    sodium chloride flush  10 mL IntraVENous 2 times per day    insulin lispro  0-12 Units SubCUTAneous 6 times per day     PRN Meds: morphine **OR** morphine, hydrALAZINE, sodium chloride flush, sodium chloride, potassium chloride **OR** potassium alternative oral replacement **OR** potassium chloride, potassium chloride, magnesium sulfate, promethazine **OR** ondansetron, magnesium hydroxide, acetaminophen **OR** acetaminophen, labetalol, glucose, dextrose, glucagon (rDNA), dextrose      Intake/Output Summary (Last 24 hours) at 12/16/2021 1705  Last data filed at 12/16/2021 1205  Gross per 24 hour   Intake 3079.99 ml   Output 1000 ml   Net 2079.99 ml       Physical Exam Performed:    BP (!) 129/55   Pulse 56   Temp 98.1 °F (36.7 °C) (Oral)   Resp 18   Ht 5' 9\" (1.753 m)   Wt 212 lb 1.3 oz (96.2 kg)   SpO2 96%   BMI 31.32 kg/m²     General appearance: No apparent distress, appears stated age and cooperative. HEENT: Pupils equal, round, and reactive to light. Conjunctivae/corneas clear. Neck: Supple, with full range of motion. No jugular venous distention. Trachea midline.   Respiratory:  Normal respiratory effort, no use of accessory muscles, no intercostal retractions  Cardiovascular: Regular rate and rhythm   Abdomen: Soft, non-tender, non-distended    Musculoskeletal: No clubbing, cyanosis or edema bilaterally. Skin: Skin color, texture, turgor normal.     Neurologic:  R sided weakness  Psychiatric: Alert and oriented        Labs:   Recent Labs     12/14/21  0525 12/15/21  0424 12/16/21  0423   WBC 6.1 5.5 12.3*   HGB 9.7* 9.0* 8.6*   HCT 29.8* 27.4* 26.7*    160 162     Recent Labs     12/14/21  0525 12/14/21  0525 12/15/21  0424 12/16/21  0423     --  142 136   K 3.3*  3.3*   < > see below  3.1* 3.6     --  111* 108   CO2 15*  --  14* 15*   BUN 27*  --  29* 31*   CREATININE 2.1*  --  2.2* 2.2*   CALCIUM 8.9  --  8.5 8.2*   PHOS 3.8  --  4.3  --     < > = values in this interval not displayed. No results for input(s): AST, ALT, BILIDIR, BILITOT, ALKPHOS in the last 72 hours. No results for input(s): INR in the last 72 hours. No results for input(s): Rhae Childes in the last 72 hours. Urinalysis:      Lab Results   Component Value Date    NITRU Negative 12/16/2021    WBCUA 10 12/16/2021    RBCUA 2 12/16/2021    BLOODU LARGE 12/16/2021    SPECGRAV 1.013 12/16/2021    GLUCOSEU Negative 12/16/2021       Radiology:  CT ABDOMEN PELVIS WO CONTRAST Additional Contrast? None   Final Result   Recurrent or ongoing sigmoid volvulus with mild surrounding inflammatory fat   stranding at the mildly left site, similar to prior. No pneumatosis, free   fluid, free air, or mesenteric or portal venous gas. Sigmoid diverticulosis, without evidence of acute diverticulitis. Mild prostate gland enlargement.       RECOMMENDATIONS:   Unavailable                 Assessment/Plan:    Active Hospital Problems    Diagnosis Date Noted    Abdominal pain [R10.9] 12/12/2021    Sigmoid volvulus ([address] [K56.2] 11/13/2021     Recurrent Volvulus  - s/p OR  - no flatus, no n/v    Afib  - resume eliquis when ok per Surgery    HTN  - continue catapres patch  - adding prn IV hydralazine    GERD   continue PPI    GILL  - IVFs     Leukocytosis  - elev procal, add levaquin uti  Diet: ADULT DIET;  Clear Liquid  Code Status: Full Code         Dominik Pappas MD

## 2021-12-17 LAB
ALBUMIN SERPL-MCNC: 2.8 G/DL (ref 3.4–5)
ANION GAP SERPL CALCULATED.3IONS-SCNC: 12 MMOL/L (ref 3–16)
APTT: 38.7 SEC (ref 26.2–38.6)
BUN BLDV-MCNC: 26 MG/DL (ref 7–20)
CALCIUM SERPL-MCNC: 8 MG/DL (ref 8.3–10.6)
CHLORIDE BLD-SCNC: 111 MMOL/L (ref 99–110)
CO2: 15 MMOL/L (ref 21–32)
CREAT SERPL-MCNC: 2 MG/DL (ref 0.8–1.3)
GFR AFRICAN AMERICAN: 39
GFR NON-AFRICAN AMERICAN: 32
GLUCOSE BLD-MCNC: 104 MG/DL (ref 70–99)
GLUCOSE BLD-MCNC: 105 MG/DL (ref 70–99)
GLUCOSE BLD-MCNC: 115 MG/DL (ref 70–99)
GLUCOSE BLD-MCNC: 116 MG/DL (ref 70–99)
GLUCOSE BLD-MCNC: 118 MG/DL (ref 70–99)
GLUCOSE BLD-MCNC: 132 MG/DL (ref 70–99)
GLUCOSE BLD-MCNC: 146 MG/DL (ref 70–99)
HCT VFR BLD CALC: 24.9 % (ref 40.5–52.5)
HEMOGLOBIN: 8 G/DL (ref 13.5–17.5)
MAGNESIUM: 1.6 MG/DL (ref 1.8–2.4)
MCH RBC QN AUTO: 29.5 PG (ref 26–34)
MCHC RBC AUTO-ENTMCNC: 32 G/DL (ref 31–36)
MCV RBC AUTO: 92.3 FL (ref 80–100)
PDW BLD-RTO: 15.8 % (ref 12.4–15.4)
PERFORMED ON: ABNORMAL
PHOSPHORUS: 2.6 MG/DL (ref 2.5–4.9)
PLATELET # BLD: 127 K/UL (ref 135–450)
PMV BLD AUTO: 9.8 FL (ref 5–10.5)
POTASSIUM REFLEX MAGNESIUM: 3.4 MMOL/L (ref 3.5–5.1)
POTASSIUM SERPL-SCNC: 3.4 MMOL/L (ref 3.5–5.1)
RBC # BLD: 2.7 M/UL (ref 4.2–5.9)
SODIUM BLD-SCNC: 138 MMOL/L (ref 136–145)
WBC # BLD: 5.8 K/UL (ref 4–11)

## 2021-12-17 PROCEDURE — 85027 COMPLETE CBC AUTOMATED: CPT

## 2021-12-17 PROCEDURE — 6360000002 HC RX W HCPCS: Performed by: HOSPITALIST

## 2021-12-17 PROCEDURE — 2580000003 HC RX 258: Performed by: SURGERY

## 2021-12-17 PROCEDURE — 6360000002 HC RX W HCPCS: Performed by: SURGERY

## 2021-12-17 PROCEDURE — APPNB45 APP NON BILLABLE 31-45 MINUTES: Performed by: NURSE PRACTITIONER

## 2021-12-17 PROCEDURE — C9113 INJ PANTOPRAZOLE SODIUM, VIA: HCPCS | Performed by: SURGERY

## 2021-12-17 PROCEDURE — 36415 COLL VENOUS BLD VENIPUNCTURE: CPT

## 2021-12-17 PROCEDURE — 97530 THERAPEUTIC ACTIVITIES: CPT

## 2021-12-17 PROCEDURE — 2060000000 HC ICU INTERMEDIATE R&B

## 2021-12-17 PROCEDURE — 6370000000 HC RX 637 (ALT 250 FOR IP): Performed by: SURGERY

## 2021-12-17 PROCEDURE — 83735 ASSAY OF MAGNESIUM: CPT

## 2021-12-17 PROCEDURE — 80069 RENAL FUNCTION PANEL: CPT

## 2021-12-17 PROCEDURE — 94760 N-INVAS EAR/PLS OXIMETRY 1: CPT

## 2021-12-17 PROCEDURE — APPSS45 APP SPLIT SHARED TIME 31-45 MINUTES: Performed by: NURSE PRACTITIONER

## 2021-12-17 PROCEDURE — 99024 POSTOP FOLLOW-UP VISIT: CPT | Performed by: SURGERY

## 2021-12-17 PROCEDURE — 85730 THROMBOPLASTIN TIME PARTIAL: CPT

## 2021-12-17 PROCEDURE — 51798 US URINE CAPACITY MEASURE: CPT

## 2021-12-17 RX ADMIN — Medication 1000 UNITS/HR: at 20:45

## 2021-12-17 RX ADMIN — PANTOPRAZOLE SODIUM 40 MG: 40 INJECTION, POWDER, FOR SOLUTION INTRAVENOUS at 08:23

## 2021-12-17 RX ADMIN — SODIUM CHLORIDE, PRESERVATIVE FREE 10 ML: 5 INJECTION INTRAVENOUS at 20:25

## 2021-12-17 RX ADMIN — SODIUM CHLORIDE: 9 INJECTION, SOLUTION INTRAVENOUS at 05:02

## 2021-12-17 RX ADMIN — MAGNESIUM SULFATE HEPTAHYDRATE 2000 MG: 2 INJECTION, SOLUTION INTRAVENOUS at 08:22

## 2021-12-17 RX ADMIN — SODIUM CHLORIDE, PRESERVATIVE FREE 10 ML: 5 INJECTION INTRAVENOUS at 08:23

## 2021-12-17 RX ADMIN — Medication 10 ML: at 08:23

## 2021-12-17 RX ADMIN — INSULIN LISPRO 2 UNITS: 100 INJECTION, SOLUTION INTRAVENOUS; SUBCUTANEOUS at 22:15

## 2021-12-17 RX ADMIN — MORPHINE SULFATE 2 MG: 2 INJECTION, SOLUTION INTRAMUSCULAR; INTRAVENOUS at 20:25

## 2021-12-17 RX ADMIN — POTASSIUM CHLORIDE 40 MEQ: 1500 TABLET, EXTENDED RELEASE ORAL at 08:23

## 2021-12-17 RX ADMIN — LEVOFLOXACIN 250 MG: 5 INJECTION, SOLUTION INTRAVENOUS at 17:37

## 2021-12-17 ASSESSMENT — PAIN DESCRIPTION - ORIENTATION: ORIENTATION: MID

## 2021-12-17 ASSESSMENT — PAIN SCALES - GENERAL
PAINLEVEL_OUTOF10: 6
PAINLEVEL_OUTOF10: 0
PAINLEVEL_OUTOF10: 4
PAINLEVEL_OUTOF10: 0
PAINLEVEL_OUTOF10: 0

## 2021-12-17 ASSESSMENT — PAIN DESCRIPTION - FREQUENCY: FREQUENCY: INTERMITTENT

## 2021-12-17 ASSESSMENT — PAIN DESCRIPTION - PAIN TYPE: TYPE: SURGICAL PAIN

## 2021-12-17 ASSESSMENT — PAIN DESCRIPTION - DESCRIPTORS: DESCRIPTORS: ACHING;CRAMPING

## 2021-12-17 ASSESSMENT — PAIN DESCRIPTION - PROGRESSION: CLINICAL_PROGRESSION: NOT CHANGED

## 2021-12-17 ASSESSMENT — PAIN DESCRIPTION - ONSET: ONSET: ON-GOING

## 2021-12-17 ASSESSMENT — PAIN DESCRIPTION - LOCATION: LOCATION: ABDOMEN

## 2021-12-17 ASSESSMENT — PAIN - FUNCTIONAL ASSESSMENT: PAIN_FUNCTIONAL_ASSESSMENT: PREVENTS OR INTERFERES SOME ACTIVE ACTIVITIES AND ADLS

## 2021-12-17 NOTE — PROGRESS NOTES
PHOS  --   --  2.6   CALCIUM   < >  --  8.0*   NITRU  --  Negative  --    COLORU  --  YELLOW  --     < > = values in this interval not displayed.        EDUCATION  Patient educated about Disease Process, Medications, Smoking Cessation, Oxygenation, Incentive Spirometry and Deep Breath and Cough, Diabetes, Hyperlipidemia, Smoking Cessation, Nutrition, Exercise and Hypertension    Electronically signed by OBI Lamas CNP on 12/17/2021 at 3:45 PM      [facility]   [phone] Office  [phone] Fax     As above  Stable POD 2 from sigmoid resection  Pain controlled  Tolerating clears  No GI function yet but no nausea or bloating either  Await GI function    Electronically signed by Rick Davis MD on 12/17/2021 at 4:21 PM

## 2021-12-17 NOTE — PLAN OF CARE
Problem: Nutrition  Goal: Optimal nutrition therapy  12/17/2021 1311 by Ngoc Disla RD, LD  Outcome: Ongoing  12/17/2021 1101 by Madan Millan RN  Outcome: Ongoing  12/17/2021 0014 by Daniel Izaguirre RN  Outcome: Ongoing     Nutrition Problem #1: Inadequate oral intake  Intervention: Food and/or Nutrient Delivery: Continue Current Diet, Start Oral Nutrition Supplement  Nutritional Goals: Consume greater than 50% of meals and supplements this admission

## 2021-12-17 NOTE — PROGRESS NOTES
Occupational Therapy  Facility/Department: [facility]GRESS[facility]  Re-assessment Note  NAME: Sabine Noriega  : 1945  MRN: [id number]    Date of Service: 2021    Discharge Recommendations:  Patient would benefit from continued therapy after discharge, 3-5 sessions per week  OT Equipment Recommendations  Other: defer to [address] [place] [address] scored a  on the AM-PAC ADL Inpatient form. Current research shows that an AM-PAC score of 17 or less is typically not associated with a discharge to the patient's home setting. Based on the patient's AM-PAC score and their current ADL deficits, it is recommended that the patient have 3-5 sessions per week of Occupational Therapy at d/c to increase the patient's independence. Please see assessment section for further patient specific details. If patient discharges prior to next session this note will serve as a discharge summary. Please see below for the latest assessment towards goals. Assessment   Performance deficits / Impairments: Decreased functional mobility ; Decreased ADL status; Decreased ROM; Decreased strength; Decreased safe awareness; Decreased balance; Decreased cognition; Decreased endurance  Assessment: Pt is a 65 yo M admitted with abdominal pain and s/p sigmoid colectomy with anastomosis 12/15/2021. Pt has h/o CVA with right sided weakness (~8 yrs prior). PTA, pt reportedly lives at home alone and transfers to power w/[employer] independently and receives assist for ADLs and IADLs from [facility] aide approx 4hr/day 7 days a week. Unclear how much assist aides are providing. TODAY- pt required mod/max A x 2 for bed mobility and max A x 2 for transfers to [facility]. Pt with significant posterior lean and has difficulty bringing hips forward. Pt with limited standing tolerance/balance necessary for ADLs. Anticipate pt will require up to max A for ADLs at this time. Pt is functioning below baseline and benefit from skilled therapy.   Treatment Diagnosis: Decreased: ADLs, fxl transfers, fxl mobility  Prognosis: Good  OT Education: OT Role; Plan of Care; ADL Adaptive Strategies; Transfer Training; Energy Conservation  REQUIRES OT FOLLOW UP: Yes  Activity Tolerance  Activity Tolerance: Patient limited by pain; Patient limited by fatigue  Safety Devices  Safety Devices in place: Yes  Type of devices: Call light within reach; Nurse notified; Patient at risk for falls; Left in chair; Chair alarm in place; Gait belt         Patient Diagnosis(es): The primary encounter diagnosis was Volvulus of sigmoid colon ([facility] [address] Diagnoses of Generalized abdominal pain and Volvulus ([facility] were also pertinent to this visit. has a past medical history of Cerebral artery occlusion with cerebral infarction ([facility] [address] Diabetes mellitus ([facility] [address] [facility] and Hypertension. has a past surgical history that includes Colonoscopy (N/A, 11/13/2021); Colonoscopy (N/A, 12/12/2021); and colectomy (N/A, 12/15/2021). Restrictions  Restrictions/Precautions  Restrictions/Precautions: Fall Risk  Position Activity Restriction  Other position/activity restrictions: H/o CVA w/ right side weakness; IV     Subjective   General  Chart Reviewed: Yes  Patient assessed for rehabilitation services?: Yes  Additional Pertinent Hx: 76m admitted 12/12/2021 with recurrence of abdominal pain secondary to sigmoid volvulus, underwent endoscopic reduction, however due to recurrence Surgery was consulted. Underwent sigmoid colectomy with anastamosis 12/15  Family / Caregiver Present: No  Referring Practitioner: Terry  Diagnosis: Abdominal pain  Subjective  Subjective: Pt seen bedside and agreeable to therapy.  Pt reporting abdominal pain but did not rate  General Comment  Comments: Per RN ok for therapy      Orientation  Orientation  Overall Orientation Status: Within Functional Limits     Objective    ADL  Additional Comments: Anticipate pt will be max A for all ADLs based on balance, ROM and endurance observed Balance  Sitting Balance:  (EOB ~ 5 min in prep for transfer; briefly CGA but mostly min A)  Standing Balance  Time: significant posterior lean, difficult bringing hips forward- mod A x 2 for static standing ~ 20 sec with odalis stedy support  Functional Mobility  Functional Mobility Comments: bed > chair via odalis stedy; recommend maxi move for transfer back to bed     Bed mobility  Supine to Sit: Maximum assistance; Moderate assistance; 2 Person assistance (HOB elevated)  Sit to Supine: Unable to assess (up in chair at end of session)    Transfers  Sit to stand: 2 Person assistance; Maximum assistance  Stand to sit: Maximum assistance; 2 Person assistance  Transfer Comments: stood from bed and chair to [address] [place] [address] with max A x 2. Pt with significant posterior lean and difficult bringing hips forward     Cognition  Overall Cognitive Status: Exceptions  Arousal/Alertness: Appropriate responses to stimuli  Following Commands:  Follows one step commands consistently  Attention Span: Appears intact  Memory: Decreased recall of recent events; Decreased recall of biographical Information  Safety Judgement: Decreased awareness of need for safety; Decreased awareness of need for assistance  Problem Solving: Decreased awareness of errors  Insights: Decreased awareness of deficits  Initiation: Requires cues for some  Sequencing: Requires cues for some  Cognition Comment: May be questionable historian        LUE AROM (degrees)  LUE AROM : WFL  Left Hand AROM (degrees)  Left Hand AROM: WFL  RUE AROM (degrees)  RUE General AROM: minimal active movement  Right Hand AROM (degrees)  Right Hand General AROM: wrist/fingers have flexion contractures, h/o CVA     Plan   Plan  Times per week: 3-5  Times per day: Daily  Current Treatment Recommendations: Strengthening, Functional Mobility Training, Endurance Training, ROM, Balance Training, Safety Education & Training, Self-Care / ADL, Equipment Evaluation, Education, & procurement,

## 2021-12-17 NOTE — PLAN OF CARE
Problem: Falls - Risk of:  Goal: Will remain free from falls  Description: Will remain free from falls  12/17/2021 0014 by Daniel Dutton RN  Outcome: Ongoing  Note: Fall risk assessment completed every shift. All precautions in place. Pt has call light within reach at all times. Room clear of clutter. Pt aware to call for assistance when getting up. Problem: Falls - Risk of:  Goal: Absence of physical injury  Description: Absence of physical injury  12/17/2021 0014 by Daniel Dutton RN  Outcome: Ongoing     Problem: Skin Integrity:  Goal: Will show no infection signs and symptoms  Description: Will show no infection signs and symptoms  12/17/2021 0014 by Daniel Dutton RN  Outcome: Ongoing  Note: Skin assessment completed every shift. Pt assessed for incontinence, appropriate barrier cream applied prn. Pt encouraged to turn/rotate every 2 hours. Assistance provided if pt unable to do so themselves. Problem: Skin Integrity:  Goal: Absence of new skin breakdown  Description: Absence of new skin breakdown  12/17/2021 0014 by Daniel Dutton RN  Outcome: Ongoing     Problem: Nutrition  Goal: Optimal nutrition therapy  12/17/2021 0014 by Daniel Dutton RN  Outcome: Ongoing     Problem: Pain:  Goal: Pain level will decrease  Description: Pain level will decrease  Outcome: Ongoing  Note: Pain/discomfort being managed with PRN analgesics per MD orders. Pt able to express presence and absence of pain and rate pain appropriately using numerical scale.        Problem: Pain:  Goal: Control of acute pain  Description: Control of acute pain  Outcome: Ongoing

## 2021-12-17 NOTE — PROGRESS NOTES
Comprehensive Nutrition Assessment    Type and Reason for Visit:  Reassess    Nutrition Recommendations/Plan:   Clear Liquid diet   Ensure Clear BID  Will monitor nutritional adequacy, nutrition-related labs, weights, BMs, and clinical progress     Nutrition Assessment:  Follow-up. S/p sigmoid colectomy with anastomosis. Now on Clears, was able to take some po this morning and tolerating thus far. Will trial Ensure Clear BID. Malnutrition Assessment:  Malnutrition Status: At risk for malnutrition (Comment)    Context:  Chronic Illness     Findings of the 6 clinical characteristics of malnutrition:  Energy Intake:  7 - 75% or less estimated energy requirements for 1 month or longer  Weight Loss:  No significant weight loss     Body Fat Loss:  No significant body fat loss     Muscle Mass Loss:  No significant muscle mass loss    Fluid Accumulation:  No significant fluid accumulation     Strength:  Not Performed    Estimated Daily Nutrient Needs:  Energy (kcal):  4216-9005 kcal (20-22 kcal/kg ABW); Weight Used for Energy Requirements:  Current     Protein (g):   gm (1.2-1.8 gm/kg IBW); Weight Used for Protein Requirements:  Ideal        Fluid (ml/day):   ; Method Used for Fluid Requirements:  1 ml/kcal      Nutrition Related Findings:  Reviewed labs      Wounds:  None (some red areas)       Current Nutrition Therapies:    ADULT DIET; Clear Liquid    Anthropometric Measures:  · Height: 5' 9\" (175.3 cm)  · Current Body Weight: 218 lb (98.9 kg)   · Admission Body Weight: 213 lb (96.6 kg)    · Usual Body Weight: 205 lb (93 kg)     · Ideal Body Weight: 160 lbs; % Ideal Body Weight 130.6 %   · BMI: 32.2  · BMI Categories: Obese Class 1 (BMI 30.0-34. 9)       Nutrition Diagnosis:   · Inadequate oral intake related to altered GI function as evidenced by NPO or clear liquid status due to medical condition      Nutrition Interventions:   Food and/or Nutrient Delivery:  Continue Current Diet, Start Oral Nutrition Supplement  Nutrition Education/Counseling:  No recommendation at this time   Coordination of Nutrition Care:  Continue to monitor while inpatient    Goals:  Consume greater than 50% of meals and supplements this admission       Nutrition Monitoring and Evaluation:   Behavioral-Environmental Outcomes:  None Identified   Food/Nutrient Intake Outcomes:  Diet Advancement/Tolerance  Physical Signs/Symptoms Outcomes:  Biochemical Data, GI Status, Nutrition Focused Physical Findings, Skin, Weight     Discharge Planning:     Too soon to determine     Electronically signed by Amairani Nj RD, LD on 12/17/21 at 1:08 PM EST    Contact: [phone]

## 2021-12-17 NOTE — PROGRESS NOTES
Hospitalist Progress Note      PCP: OBI Wright - CNP    Date of Admission: 12/12/2021    Chief Complaint: abdominal pain    Hospital Course: 76m admitted with recurrence of abdominal pain secondary to sigmoid volvulus    Subjective: Patient seen on room air, no distress, denies fever cough or uncontrolled abd pain      Medications:  Reviewed    Infusion Medications    sodium chloride      sodium chloride 125 mL/hr at 12/17/21 0502    [Held by provider] heparin (PORCINE) Infusion Stopped (12/15/21 0946)    dextrose       Scheduled Medications    levofloxacin  250 mg IntraVENous Q24H    cloNIDine  1 patch TransDERmal Weekly    pantoprazole  40 mg IntraVENous Daily    And    sodium chloride (PF)  10 mL IntraVENous Daily    sodium chloride flush  10 mL IntraVENous 2 times per day    insulin lispro  0-12 Units SubCUTAneous 6 times per day     PRN Meds: morphine **OR** morphine, hydrALAZINE, sodium chloride flush, sodium chloride, potassium chloride **OR** potassium alternative oral replacement **OR** potassium chloride, potassium chloride, magnesium sulfate, promethazine **OR** ondansetron, magnesium hydroxide, acetaminophen **OR** acetaminophen, labetalol, glucose, dextrose, glucagon (rDNA), dextrose      Intake/Output Summary (Last 24 hours) at 12/17/2021 1514  Last data filed at 12/17/2021 1343  Gross per 24 hour   Intake 1855.61 ml   Output 1295 ml   Net 560.61 ml       Physical Exam Performed:    BP (!) 158/78   Pulse 56   Temp 97.4 °F (36.3 °C) (Oral)   Resp 21   Ht 5' 9\" (1.753 m)   Wt 218 lb 0.6 oz (98.9 kg) Comment: 2nd check  SpO2 97%   BMI 32.20 kg/m²     General appearance: No apparent distress, appears stated age and cooperative. HEENT: Pupils equal, round, and reactive to light. Conjunctivae/corneas clear. Neck: Supple, with full range of motion. No jugular venous distention. Trachea midline.   Respiratory:  Normal respiratory effort, no use of accessory muscles, no intercostal retractions  Cardiovascular: Regular rate and rhythm   Abdomen: Soft, non-tender, non-distended    Musculoskeletal: No clubbing, cyanosis or edema bilaterally. Skin: Skin color, texture, turgor normal.     Neurologic:  R sided weakness  Psychiatric: Alert and oriented        Labs:   Recent Labs     12/15/21  0424 12/16/21  0423 12/17/21  0434   WBC 5.5 12.3* 5.8   HGB 9.0* 8.6* 8.0*   HCT 27.4* 26.7* 24.9*    162 127*     Recent Labs     12/15/21  0424 12/15/21  0424 12/16/21  0423 12/17/21  0434     --  136 138   K see below  3.1*   < > 3.6 3.4*  3.4*   *  --  108 111*   CO2 14*  --  15* 15*   BUN 29*  --  31* 26*   CREATININE 2.2*  --  2.2* 2.0*   CALCIUM 8.5  --  8.2* 8.0*   PHOS 4.3  --   --  2.6    < > = values in this interval not displayed. No results for input(s): AST, ALT, BILIDIR, BILITOT, ALKPHOS in the last 72 hours. No results for input(s): INR in the last 72 hours. No results for input(s): Jim Gey in the last 72 hours. Urinalysis:      Lab Results   Component Value Date    NITRU Negative 12/16/2021    WBCUA 10 12/16/2021    RBCUA 2 12/16/2021    BLOODU LARGE 12/16/2021    SPECGRAV 1.013 12/16/2021    GLUCOSEU Negative 12/16/2021       Radiology:  CT ABDOMEN PELVIS WO CONTRAST Additional Contrast? None   Final Result   Recurrent or ongoing sigmoid volvulus with mild surrounding inflammatory fat   stranding at the mildly left site, similar to prior. No pneumatosis, free   fluid, free air, or mesenteric or portal venous gas. Sigmoid diverticulosis, without evidence of acute diverticulitis. Mild prostate gland enlargement.       RECOMMENDATIONS:   Unavailable                 Assessment/Plan:    Active Hospital Problems    Diagnosis Date Noted    Abdominal pain [R10.9] 12/12/2021    Sigmoid volvulus ([address] [K56.2] 11/13/2021     Recurrent Volvulus  - s/p OR  - no flatus, no n/v    Afib  - resume eliquis when ok per Surgery    HTN  - continue catapres patch  - adding prn IV hydralazine    GERD   continue PPI    GILL  - appears at baseline, trace bipedal edema, dec ivf    Leukocytosis  - elev procal, added levaquin for poss uti  Diet: ADULT DIET;  Clear Liquid  ADULT ORAL NUTRITION SUPPLEMENT; Breakfast, Dinner; Clear Liquid Oral Supplement  Code Status: Full Code         Debbi Valdez MD

## 2021-12-17 NOTE — PROGRESS NOTES
Physician Progress Note      PATIENT:               Rasta Zepeda  CSN #:                  [id number]  :                       1945  ADMIT DATE:       2021 12:42 PM  [address] [place] DATE:  RESPONDING  PROVIDER #:        Daniel Rivera MD          QUERY TEXT:    Pt admitted with recurrent sigmoid volvulus and had colectomy. Drop in H/H   from 10.3/32.3 on admission down to 8.0/24.9 currently. If possible, please   document in the progress notes and discharge summary if you are evaluating   and/or treating any of the following: The medical record reflects the following:  Risk Factors: post colectomy for recurrent sigmoid volvulus  Clinical Indicators: Drop in H/H from 10.3/32.3 on admission down to 8.0/24.9   currently. Treatment: monitoring H/H    Thank you,  Carolina Godoy RN, BSN, CCDS  [email or web address]  Options provided:  -- Postoperative acute blood loss anemia  -- Other - I will add my own diagnosis  -- Disagree - Not applicable / Not valid  -- Disagree - Clinically unable to determine / Unknown  -- Refer to Clinical Documentation Reviewer    PROVIDER RESPONSE TEXT:    Provider disagreed with this query.   suspect dillutional    Query created by: Elsy Schultz on 2021 8:18 AM      Electronically signed by:  Daniel Rivera MD 2021 3:13 PM

## 2021-12-17 NOTE — PROGRESS NOTES
Physical Therapy  Facility/Department: [facility] 5 PROGRESSIVE CARE  Daily Treatment Note and re-eval  NAME: Ruddy Or  : 1945  MRN: [id number]    Date of Service: 2021    Discharge Recommendations:  3-5 sessions per week, Patient would benefit from continued therapy after discharge   PT Equipment Recommendations  Equipment Needed: No  Other: defer to next level of care     Ruddy Or scored a  on the AM-PAC short mobility form. Current research shows that an AM-PAC score of 17 or less is typically not associated with a discharge to the patient's home setting. Based on the patient's AM-PAC score and their current functional mobility deficits, it is recommended that the patient have 3-5 sessions per week of Physical Therapy at d/c to increase the patient's independence. Please see assessment section for further patient specific details. If patient discharges prior to next session this note will serve as a discharge summary. Please see below for the latest assessment towards goals. Assessment   Body structures, Functions, Activity limitations: Decreased functional mobility   Assessment: Patient is a 47-year-old male with past medical history of diabetes mellitus who presented to hospital on 21 for abdominal pain and diarrhea. Pt underwent sigmoid volvulus decompression on 21. S/p SIGMOID COLECTOMY  With anastomosis on 12/15. Prior to admission, pt living alone in home setting with ramped entrance, reports home health aide for 3 hours per day, 7 days/week. .  Pt reports that he has assist daily getting in/out of bed but indep with pivot transfers. Today, pt required maxAx2 to stand to odalis stedy 2 trials. He is below his functional baseline and is unsafe to return home. Recommend continued skilled inpatient PT at a low-moderate intensity upon d/c to decrease burden of care. Will continue to follow.   Treatment Diagnosis: impaired mobility  Specific instructions for Next Treatment: cotx  Prognosis: Fair  PT Education: PT Role; Plan of Care; General Safety; Transfer Training; Functional Mobility Training  REQUIRES PT FOLLOW UP: Yes  Activity Tolerance  Activity Tolerance: Patient limited by fatigue; Patient limited by pain     Patient Diagnosis(es): The primary encounter diagnosis was Volvulus of sigmoid colon ([facility] [address] Diagnoses of Generalized abdominal pain and Volvulus ([facility] [address] were also pertinent to this visit. has a past medical history of Cerebral artery occlusion with cerebral infarction ([facility] [address] Diabetes mellitus ([facility] [address] and Hypertension. has a past surgical history that includes Colonoscopy (N/A, 11/13/2021); Colonoscopy (N/A, 12/12/2021); and colectomy (N/A, 12/15/2021). Restrictions  Restrictions/Precautions  Restrictions/Precautions: Fall Risk  Position Activity Restriction  Other position/activity restrictions: H/o CVA w/ right side weakness; IV     Social/Functional History  Lives With: Alone  Type of Home: House  Home Layout: Two level, Able to Live on Main level with bedroom/bathroom  Home Access: Ramped entrance  Bathroom Shower/Tub: Walk-in shower, Shower chair with back  Donovan Toilet: Handicap height  Bathroom Equipment: Grab bars in shower, Grab bars around toilet  Home Equipment: Wheelchair-electric  ADL Assistance: Needs assistance (Aide assists with bathing/dressing)  Homemaking Assistance: Needs assistance (Aide completes)  Ambulation Assistance:  (Uses w/c)  Transfer Assistance: Independent  Active : No  Patient's  Info: Aide assists (?)  Additional Comments: Sleeps in a hospital bed. Questionable historian. Subjective   General  Chart Reviewed: Yes  Additional Pertinent Hx: Patient is a 75-year-old male with past medical history of diabetes mellitus who presented to hospital on 12/12/21 for abdominal pain and diarrhea. Pt underwent sigmoid volvulus decompression on 12/12/21. S/p SIGMOID COLECTOMY  With anastomosis on 12/15.   Therapy re-ordered by Booker Maldonado on 12/16 to resume PT treatment. Response To Previous Treatment: Patient with no complaints from previous session. Family / Caregiver Present: No  Referring Practitioner: Anisha Kelsey MD  Subjective  Subjective: Pt supine in bed upon arrival.  Agreeable to PT treatment. Reports pain in abdomen with mobility. Orientation  Orientation  Overall Orientation Status: Within Functional Limits  Orientation Level: Oriented to place; Oriented to time; Oriented to person; Oriented to situation     Objective   Bed mobility  Supine to Sit: Maximum assistance;  Moderate assistance; 2 Person assistance (HOB elevated)  Sit to Supine: Unable to assess (up in chair at end of session)     Transfers  Sit to Stand: Maximum Assistance; 2 Person Assistance (to [address])  Stand to sit: Maximum Assistance; 2 Person Assistance  Bed to Chair: Dependent/Total (via odalis stedy)     Ambulation  Ambulation?: No (non-ambulatory baseline.)     Balance  Comments: initially Yara-CGA sitting EOB but briefly able to sit with SBA; modAx2 for static standing balance in odalis stedy due to posterior lean and difficulty bringing hips anterior            Other Activities: Other (see comment)  Comment: pt positioned for comfort in chair at end of session           AM-PAC Score  AM-PAC Inpatient Mobility Raw Score : 9 (12/17/21 1134)  AM-PAC Inpatient T-Scale Score : 30.55 (12/17/21 1134)  Mobility Inpatient CMS 0-100% Score: 81.38 (12/17/21 1134)  Mobility Inpatient CMS G-Code Modifier : CM (12/17/21 1134)          Goals  Short term goals  Time Frame for Short term goals: by acute discharge--goals ongoing 12/17 with limited progress  Short term goal 1: bed mobiltiy with min A  Short term goal 2: stand pivot vs squat pviot with min A  Patient Goals   Patient goals : none stated    Plan    Plan  Times per week: 3-5x/week  Specific instructions for Next Treatment: cotx  Current Treatment Recommendations: Strengthening, Functional Mobility Training, Transfer Training, Balance Training, Endurance Training, Patient/Caregiver Education & Training, Safety Education & Training, Equipment Evaluation, Education, & procurement, Neuromuscular Re-education  Safety Devices  Type of devices:  All fall risk precautions in place, Call light within reach, Gait belt, Patient at risk for falls, Nurse notified, Chair alarm in place, Left in chair (CLEO Millard notified)  Restraints  Initially in place: No     Therapy Time   Individual Concurrent Group Co-treatment   Time In 1105         Time Out 1135         Minutes 30         Timed Code Treatment Minutes: 30 Minutes         Electronically signed by Eugenia Ramirez, [address] [place] [address] on 12/17/2021 at 11:40 AM

## 2021-12-17 NOTE — PLAN OF CARE
Problem: Falls - Risk of:  Goal: Will remain free from falls  Description: Will remain free from falls  12/17/2021 1101 by Mikle Libman, RN  Outcome: Ongoing     Problem: Skin Integrity:  Goal: Will show no infection signs and symptoms  Description: Will show no infection signs and symptoms  12/17/2021 1101 by Mikle Libman, RN  Outcome: Ongoing     Problem: Nutrition  Goal: Optimal nutrition therapy  12/17/2021 1101 by Mikle Libman, RN  Outcome: Ongoing

## 2021-12-18 LAB
ALBUMIN SERPL-MCNC: 2.7 G/DL (ref 3.4–5)
ANION GAP SERPL CALCULATED.3IONS-SCNC: 12 MMOL/L (ref 3–16)
APTT: 50.8 SEC (ref 26.2–38.6)
APTT: 59.3 SEC (ref 26.2–38.6)
APTT: 77.9 SEC (ref 26.2–38.6)
APTT: 78.6 SEC (ref 26.2–38.6)
BUN BLDV-MCNC: 22 MG/DL (ref 7–20)
CALCIUM SERPL-MCNC: 8 MG/DL (ref 8.3–10.6)
CHLORIDE BLD-SCNC: 112 MMOL/L (ref 99–110)
CO2: 15 MMOL/L (ref 21–32)
CREAT SERPL-MCNC: 1.8 MG/DL (ref 0.8–1.3)
GFR AFRICAN AMERICAN: 44
GFR NON-AFRICAN AMERICAN: 37
GLUCOSE BLD-MCNC: 112 MG/DL (ref 70–99)
GLUCOSE BLD-MCNC: 128 MG/DL (ref 70–99)
GLUCOSE BLD-MCNC: 166 MG/DL (ref 70–99)
GLUCOSE BLD-MCNC: 171 MG/DL (ref 70–99)
GLUCOSE BLD-MCNC: 86 MG/DL (ref 70–99)
GLUCOSE BLD-MCNC: 86 MG/DL (ref 70–99)
GLUCOSE BLD-MCNC: 87 MG/DL (ref 70–99)
GLUCOSE BLD-MCNC: 93 MG/DL (ref 70–99)
HCT VFR BLD CALC: 24.6 % (ref 40.5–52.5)
HCT VFR BLD CALC: 28.4 % (ref 40.5–52.5)
HEMOGLOBIN: 7.9 G/DL (ref 13.5–17.5)
HEMOGLOBIN: 8.5 G/DL (ref 13.5–17.5)
MCH RBC QN AUTO: 29.6 PG (ref 26–34)
MCHC RBC AUTO-ENTMCNC: 32.3 G/DL (ref 31–36)
MCV RBC AUTO: 91.5 FL (ref 80–100)
PDW BLD-RTO: 15.8 % (ref 12.4–15.4)
PERFORMED ON: ABNORMAL
PERFORMED ON: NORMAL
PHOSPHORUS: 2.3 MG/DL (ref 2.5–4.9)
PLATELET # BLD: 134 K/UL (ref 135–450)
PMV BLD AUTO: 9.8 FL (ref 5–10.5)
POTASSIUM REFLEX MAGNESIUM: 3.6 MMOL/L (ref 3.5–5.1)
POTASSIUM SERPL-SCNC: 3.6 MMOL/L (ref 3.5–5.1)
RBC # BLD: 2.69 M/UL (ref 4.2–5.9)
SODIUM BLD-SCNC: 139 MMOL/L (ref 136–145)
URINE CULTURE, ROUTINE: NORMAL
WBC # BLD: 6.6 K/UL (ref 4–11)

## 2021-12-18 PROCEDURE — 6370000000 HC RX 637 (ALT 250 FOR IP): Performed by: SURGERY

## 2021-12-18 PROCEDURE — 6360000002 HC RX W HCPCS: Performed by: HOSPITALIST

## 2021-12-18 PROCEDURE — 85730 THROMBOPLASTIN TIME PARTIAL: CPT

## 2021-12-18 PROCEDURE — C9113 INJ PANTOPRAZOLE SODIUM, VIA: HCPCS | Performed by: SURGERY

## 2021-12-18 PROCEDURE — 2500000003 HC RX 250 WO HCPCS: Performed by: SURGERY

## 2021-12-18 PROCEDURE — 85018 HEMOGLOBIN: CPT

## 2021-12-18 PROCEDURE — 2060000000 HC ICU INTERMEDIATE R&B

## 2021-12-18 PROCEDURE — 85014 HEMATOCRIT: CPT

## 2021-12-18 PROCEDURE — 6360000002 HC RX W HCPCS: Performed by: INTERNAL MEDICINE

## 2021-12-18 PROCEDURE — 2580000003 HC RX 258: Performed by: SURGERY

## 2021-12-18 PROCEDURE — 2580000003 HC RX 258: Performed by: HOSPITALIST

## 2021-12-18 PROCEDURE — 94760 N-INVAS EAR/PLS OXIMETRY 1: CPT

## 2021-12-18 PROCEDURE — 80069 RENAL FUNCTION PANEL: CPT

## 2021-12-18 PROCEDURE — 6360000002 HC RX W HCPCS: Performed by: SURGERY

## 2021-12-18 PROCEDURE — 36415 COLL VENOUS BLD VENIPUNCTURE: CPT

## 2021-12-18 PROCEDURE — 99024 POSTOP FOLLOW-UP VISIT: CPT | Performed by: SURGERY

## 2021-12-18 PROCEDURE — 85027 COMPLETE CBC AUTOMATED: CPT

## 2021-12-18 RX ORDER — HEPARIN SODIUM 1000 [USP'U]/ML
2000 INJECTION, SOLUTION INTRAVENOUS; SUBCUTANEOUS ONCE
Status: COMPLETED | OUTPATIENT
Start: 2021-12-18 | End: 2021-12-18

## 2021-12-18 RX ADMIN — Medication 10 ML: at 07:57

## 2021-12-18 RX ADMIN — HEPARIN SODIUM 2000 UNITS: 1000 INJECTION INTRAVENOUS; SUBCUTANEOUS at 11:15

## 2021-12-18 RX ADMIN — LEVOFLOXACIN 250 MG: 5 INJECTION, SOLUTION INTRAVENOUS at 19:06

## 2021-12-18 RX ADMIN — SODIUM CHLORIDE, PRESERVATIVE FREE 10 ML: 5 INJECTION INTRAVENOUS at 07:56

## 2021-12-18 RX ADMIN — SODIUM CHLORIDE: 9 INJECTION, SOLUTION INTRAVENOUS at 05:59

## 2021-12-18 RX ADMIN — Medication 1380 UNITS/HR: at 11:16

## 2021-12-18 RX ADMIN — Medication 1380 UNITS/HR: at 21:10

## 2021-12-18 RX ADMIN — LABETALOL HYDROCHLORIDE 10 MG: 5 INJECTION INTRAVENOUS at 15:58

## 2021-12-18 RX ADMIN — INSULIN LISPRO 2 UNITS: 100 INJECTION, SOLUTION INTRAVENOUS; SUBCUTANEOUS at 19:06

## 2021-12-18 RX ADMIN — PANTOPRAZOLE SODIUM 40 MG: 40 INJECTION, POWDER, FOR SOLUTION INTRAVENOUS at 07:55

## 2021-12-18 RX ADMIN — HEPARIN SODIUM 2000 UNITS: 1000 INJECTION INTRAVENOUS; SUBCUTANEOUS at 02:54

## 2021-12-18 ASSESSMENT — PAIN SCALES - GENERAL
PAINLEVEL_OUTOF10: 0
PAINLEVEL_OUTOF10: 0

## 2021-12-18 NOTE — PROGRESS NOTES
Clinical Pharmacy Note  Heparin Dosing       Lab Results   Component Value Date    APTT 50.8 12/18/2021     Lab Results   Component Value Date    HGB 8.0 12/17/2021    HCT 24.9 12/17/2021     12/17/2021       Current Infusion Rate: 1,000 units/hr    Plan:  Bolus: 2,000 units IVP x 1  Rate: Increase heparin to 1,190 units/hr  Next aPTT: 0900 12/18/21    Pharmacy will continue to monitor and adjust based on aPTT results.     Ernie Camacho, [facility]  12/18/2021 2:46 AM

## 2021-12-18 NOTE — PLAN OF CARE
Problem: Falls - Risk of:  Goal: Will remain free from falls  Description: Will remain free from falls  12/18/2021 0032 by Dylon Green RN  Outcome: Ongoing  Note: Fall risk assessment completed every shift. All precautions in place. Pt has call light within reach at all times. Room clear of clutter. Pt aware to call for assistance when getting up. Problem: Falls - Risk of:  Goal: Absence of physical injury  Description: Absence of physical injury  12/18/2021 0032 by Dylon Green RN  Outcome: Ongoing     Problem: Skin Integrity:  Goal: Will show no infection signs and symptoms  Description: Will show no infection signs and symptoms  12/18/2021 0032 by Dylon Green RN  Outcome: Ongoing  Note: Skin assessment completed every shift. Pt assessed for incontinence, appropriate barrier cream applied prn. Pt encouraged to turn/rotate every 2 hours. Assistance provided if pt unable to do so themselves. Problem: Skin Integrity:  Goal: Absence of new skin breakdown  Description: Absence of new skin breakdown  12/18/2021 0032 by Dylon Green RN  Outcome: Ongoing     Problem: Nutrition  Goal: Optimal nutrition therapy  12/18/2021 0032 by Dylon Green RN  Outcome: Ongoing     Problem: Pain:  Goal: Pain level will decrease  Description: Pain level will decrease  12/18/2021 0032 by Dylon Green RN  Outcome: Ongoing  Note: Pain/discomfort being managed with PRN analgesics per MD orders. Pt able to express presence and absence of pain and rate pain appropriately using numerical scale.        Problem: Pain:  Goal: Control of acute pain  Description: Control of acute pain  12/18/2021 0032 by Dylon Green RN  Outcome: Ongoing     Problem: Pain:  Goal: Control of chronic pain  Description: Control of chronic pain  12/18/2021 0032 by Dylon Green RN  Outcome: Ongoing

## 2021-12-18 NOTE — PROGRESS NOTES
Clinical Pharmacy Note  Heparin Dosing       Lab Results   Component Value Date    APTT 78.6 12/18/2021     Lab Results   Component Value Date    HGB 8.5 12/18/2021    HCT 28.4 12/18/2021     12/18/2021       Current Infusion Rate: 1380 units/hr    Plan:    Continue current rate:  1380 units/hr  Next aPTT: 2200 on 12-18-21    Pharmacy will continue to monitor and adjust based on aPTT results.   Lacey Cheek, [facility]  12/18/2021  5:27 PM

## 2021-12-18 NOTE — PROGRESS NOTES
Clinical Pharmacy Note  Heparin Dosing       Lab Results   Component Value Date    APTT 38.7 12/17/2021     Lab Results   Component Value Date    HGB 8.0 12/17/2021    HCT 24.9 12/17/2021     12/17/2021       Current Infusion Rate: 0 units/hr (currently on hold)    Plan:  Ok to restart IV heparin at this time per Dr. Kelley Bety: Restart heparin at 1,000 units/hr  Next aPTT: 0200 12/18/21    Pharmacy will continue to monitor and adjust based on aPTT results.     Myah Toney, [facility]  12/17/2021 7:09 PM

## 2021-12-18 NOTE — PROGRESS NOTES
[facility] General Surgery                                   Daily Progress Note                                                         Pt Name: Regina Swanson  Medical Record Number: [id number]  Date of Birth 1945   Today's Date: 12/18/2021  Chief Complaint   Patient presents with    Abdominal Pain        ASSESSMENT/PLAN  Recurring sigmoid volvulus  -12/15/2021 sigmoid colectomy with anastomosis  -no significant changes  -continue liquids until GI function improves      SUBJECTIVE  Mary Anjelicas is resting in bed, no acute distress. Pain controlled. Denies GI function. OBJECTIVE  VITALS:  height is 5' 9\" (1.753 m) and weight is 217 lb 6 oz (98.6 kg). His oral temperature is 97.9 °F (36.6 °C). His blood pressure is 195/75 (abnormal) and his pulse is 50. His respiration is 20 and oxygen saturation is 100%. INTAKE/OUTPUT:      Intake/Output Summary (Last 24 hours) at 12/18/2021 1547  Last data filed at 12/18/2021 0602  Gross per 24 hour   Intake 3465.69 ml   Output 950 ml   Net 2515.69 ml     GENERAL: alert, cooperative, no distress  LUNGS: clear to ausculation, without wheezes, rales or rhonci  HEART: normal rate and regular rhythm  ABDOMEN: Soft, appropriately tender, non distended. Incision dressing intact, shadow drainage noted. EXTREMITY: no cyanosis, clubbing or edema    I/O last 3 completed shifts:   In: 3465.7 [P.O.:360; I.V.:3013.9; IV Piggyback:91.8]  Out: 950 [Urine:950]      LABS  Recent Labs     12/16/21  0423 12/16/21  1205 12/17/21  0434 12/17/21  0434 12/18/21  0441 12/18/21  0442 12/18/21  0442 12/18/21  1159   WBC   < >  --  5.8   < >  --  6.6  --   --    HGB   < >  --  8.0*   < >  --  7.9*   < > 8.5*   HCT   < >  --  24.9*   < >  --  24.6*   < > 28.4*   PLT   < >  --  127*   < >  --  134*  --   --    NA   < >  --  138   < > 139  --   --   --    K   < >  --  3.4*  3.4*   < > 3.6  3.6  --   --   --    CL   < >  --  111*   < > 112*  --   --   --    CO2   < >  --  15*   < > 15*  --   --   --    BUN   < >  --  26*   < > 22*  --   --   --    CREATININE   < >  --  2.0*   < > 1.8*  --   --   --    MG  --   --  1.60*  --   --   --   --   --    PHOS  --   --  2.6   < > 2.3*  --   --   --    CALCIUM   < >  --  8.0*   < > 8.0*  --   --   --    NITRU  --  Negative  --   --   --   --   --   --    COLORU  --  YELLOW  --   --   --   --   --   --     < > = values in this interval not displayed.        EDUCATION  Patient educated about Disease Process, Medications, Smoking Cessation, Oxygenation, Incentive Spirometry and Deep Breath and Cough, Diabetes, Hyperlipidemia, Smoking Cessation, Nutrition, Exercise and Hypertension    Electronically signed by Maida Kelsey MD on 12/18/2021 at 3:47 PM      [address] and Vascular Surgery   [phone] Office  [phone] Fax

## 2021-12-18 NOTE — PROGRESS NOTES
Hospitalist Progress Note      PCP: Lynn Barbosa, APRN - CNP    Date of Admission: 12/12/2021    Chief Complaint: abdominal pain    Hospital Course: 76m admitted with recurrence of abdominal pain secondary to sigmoid volvulus    Subjective: Patient seen on room air, no distress, denies fever cough or uncontrolled abd pain      Medications:  Reviewed    Infusion Medications    sodium chloride      sodium chloride 50 mL/hr at 12/18/21 0600    heparin (PORCINE) Infusion 1,380 Units/hr (12/18/21 1116)    dextrose       Scheduled Medications    levofloxacin  250 mg IntraVENous Q24H    cloNIDine  1 patch TransDERmal Weekly    pantoprazole  40 mg IntraVENous Daily    And    sodium chloride (PF)  10 mL IntraVENous Daily    sodium chloride flush  10 mL IntraVENous 2 times per day    insulin lispro  0-12 Units SubCUTAneous 6 times per day     PRN Meds: morphine **OR** morphine, hydrALAZINE, sodium chloride flush, sodium chloride, potassium chloride **OR** potassium alternative oral replacement **OR** potassium chloride, potassium chloride, magnesium sulfate, promethazine **OR** ondansetron, magnesium hydroxide, acetaminophen **OR** acetaminophen, labetalol, glucose, dextrose, glucagon (rDNA), dextrose      Intake/Output Summary (Last 24 hours) at 12/18/2021 1453  Last data filed at 12/18/2021 0602  Gross per 24 hour   Intake 3465.69 ml   Output 950 ml   Net 2515.69 ml       Physical Exam Performed:    BP (!) 172/77 Comment: unable to give labetalol d/t HR<60  Pulse 52   Temp 97.6 °F (36.4 °C) (Oral)   Resp 16   Ht 5' 9\" (1.753 m)   Wt 217 lb 6 oz (98.6 kg)   SpO2 97%   BMI 32.10 kg/m²     General appearance: No apparent distress, appears stated age and cooperative. HEENT: Pupils equal, round, and reactive to light. Conjunctivae/corneas clear. Neck: Supple, with full range of motion. No jugular venous distention. Trachea midline.   Respiratory:  Normal respiratory effort, no use of accessory muscles, no intercostal retractions  Cardiovascular: Regular rate and rhythm   Abdomen: Soft, non-tender, non-distended    Musculoskeletal: No clubbing, cyanosis or edema bilaterally. Skin: Skin color, texture, turgor normal.     Neurologic:  R sided weakness  Psychiatric: Alert and oriented        Labs:   Recent Labs     12/16/21 0423 12/16/21 0423 12/17/21  0434 12/18/21  0442 12/18/21  1159   WBC 12.3*  --  5.8 6.6  --    HGB 8.6*   < > 8.0* 7.9* 8.5*   HCT 26.7*   < > 24.9* 24.6* 28.4*     --  127* 134*  --     < > = values in this interval not displayed. Recent Labs     12/16/21 0423 12/16/21 0423 12/17/21 0434 12/18/21  0441     --  138 139   K 3.6   < > 3.4*  3.4* 3.6  3.6     --  111* 112*   CO2 15*  --  15* 15*   BUN 31*  --  26* 22*   CREATININE 2.2*  --  2.0* 1.8*   CALCIUM 8.2*  --  8.0* 8.0*   PHOS  --   --  2.6 2.3*    < > = values in this interval not displayed. No results for input(s): AST, ALT, BILIDIR, BILITOT, ALKPHOS in the last 72 hours. No results for input(s): INR in the last 72 hours. No results for input(s): Wilmer Dunlap in the last 72 hours. Urinalysis:      Lab Results   Component Value Date    NITRU Negative 12/16/2021    WBCUA 10 12/16/2021    RBCUA 2 12/16/2021    BLOODU LARGE 12/16/2021    SPECGRAV 1.013 12/16/2021    GLUCOSEU Negative 12/16/2021       Radiology:  CT ABDOMEN PELVIS WO CONTRAST Additional Contrast? None   Final Result   Recurrent or ongoing sigmoid volvulus with mild surrounding inflammatory fat   stranding at the mildly left site, similar to prior. No pneumatosis, free   fluid, free air, or mesenteric or portal venous gas. Sigmoid diverticulosis, without evidence of acute diverticulitis. Mild prostate gland enlargement.       RECOMMENDATIONS:   Unavailable                 Assessment/Plan:    Active Hospital Problems    Diagnosis Date Noted    Abdominal pain [R10.9] 12/12/2021    Sigmoid volvulus ([facility] [address] [K56.2] 11/13/2021     Recurrent Volvulus  - s/p OR  - no flatus, no n/v    Afib  - resume eliquis when tolerating PO     HTN  - continue catapres patch  - adding prn IV hydralazine    GERD   continue PPI    GILL  - appears at baseline crea 1.8, trace bipedal edema, dec ivf    Leukocytosis  - elev procal, added levaquin for poss uti  Diet: ADULT ORAL NUTRITION SUPPLEMENT; Breakfast, Dinner; Clear Liquid Oral Supplement  ADULT DIET;  Full Liquid  Code Status: Full Code         Karen Saavedra MD

## 2021-12-18 NOTE — PROGRESS NOTES
Clinical Pharmacy Note  Heparin Dosing       Lab Results   Component Value Date    APTT 59.3 12/18/2021     Lab Results   Component Value Date    HGB 7.9 12/18/2021    HCT 24.6 12/18/2021     12/18/2021       Current Infusion Rate: 1190 units/hr    Plan:  Bolus: 2000 units  Increase rate to: 1380 units/hr  Next aPTT: 1630 12/18/21    Pharmacy will continue to monitor and adjust based on aPTT results.     Analisa Metcalf PharmD, BCPS  12/18/2021  10:36 AM

## 2021-12-19 LAB
ALBUMIN SERPL-MCNC: 2.9 G/DL (ref 3.4–5)
ANION GAP SERPL CALCULATED.3IONS-SCNC: 13 MMOL/L (ref 3–16)
APTT: 80.7 SEC (ref 26.2–38.6)
BUN BLDV-MCNC: 18 MG/DL (ref 7–20)
CALCIUM SERPL-MCNC: 8 MG/DL (ref 8.3–10.6)
CHLORIDE BLD-SCNC: 112 MMOL/L (ref 99–110)
CO2: 16 MMOL/L (ref 21–32)
CREAT SERPL-MCNC: 1.6 MG/DL (ref 0.8–1.3)
GFR AFRICAN AMERICAN: 51
GFR NON-AFRICAN AMERICAN: 42
GLUCOSE BLD-MCNC: 101 MG/DL (ref 70–99)
GLUCOSE BLD-MCNC: 103 MG/DL (ref 70–99)
GLUCOSE BLD-MCNC: 118 MG/DL (ref 70–99)
GLUCOSE BLD-MCNC: 132 MG/DL (ref 70–99)
GLUCOSE BLD-MCNC: 147 MG/DL (ref 70–99)
GLUCOSE BLD-MCNC: 98 MG/DL (ref 70–99)
HCT VFR BLD CALC: 24.6 % (ref 40.5–52.5)
HEMOGLOBIN: 7.9 G/DL (ref 13.5–17.5)
MAGNESIUM: 1.5 MG/DL (ref 1.8–2.4)
MCH RBC QN AUTO: 29.2 PG (ref 26–34)
MCHC RBC AUTO-ENTMCNC: 32.3 G/DL (ref 31–36)
MCV RBC AUTO: 90.4 FL (ref 80–100)
PDW BLD-RTO: 15.7 % (ref 12.4–15.4)
PERFORMED ON: ABNORMAL
PERFORMED ON: NORMAL
PHOSPHORUS: 2.9 MG/DL (ref 2.5–4.9)
PLATELET # BLD: 142 K/UL (ref 135–450)
PMV BLD AUTO: 9.9 FL (ref 5–10.5)
POTASSIUM REFLEX MAGNESIUM: 3.5 MMOL/L (ref 3.5–5.1)
POTASSIUM SERPL-SCNC: 3.5 MMOL/L (ref 3.5–5.1)
RBC # BLD: 2.72 M/UL (ref 4.2–5.9)
SODIUM BLD-SCNC: 141 MMOL/L (ref 136–145)
WBC # BLD: 6.7 K/UL (ref 4–11)

## 2021-12-19 PROCEDURE — 2060000000 HC ICU INTERMEDIATE R&B

## 2021-12-19 PROCEDURE — 2580000003 HC RX 258: Performed by: SURGERY

## 2021-12-19 PROCEDURE — 85027 COMPLETE CBC AUTOMATED: CPT

## 2021-12-19 PROCEDURE — 6370000000 HC RX 637 (ALT 250 FOR IP): Performed by: HOSPITALIST

## 2021-12-19 PROCEDURE — 83735 ASSAY OF MAGNESIUM: CPT

## 2021-12-19 PROCEDURE — 99024 POSTOP FOLLOW-UP VISIT: CPT | Performed by: SURGERY

## 2021-12-19 PROCEDURE — 6360000002 HC RX W HCPCS: Performed by: HOSPITALIST

## 2021-12-19 PROCEDURE — 80069 RENAL FUNCTION PANEL: CPT

## 2021-12-19 PROCEDURE — 6360000002 HC RX W HCPCS: Performed by: SURGERY

## 2021-12-19 PROCEDURE — 85730 THROMBOPLASTIN TIME PARTIAL: CPT

## 2021-12-19 PROCEDURE — C9113 INJ PANTOPRAZOLE SODIUM, VIA: HCPCS | Performed by: SURGERY

## 2021-12-19 PROCEDURE — 36415 COLL VENOUS BLD VENIPUNCTURE: CPT

## 2021-12-19 RX ADMIN — APIXABAN 5 MG: 5 TABLET, FILM COATED ORAL at 20:31

## 2021-12-19 RX ADMIN — APIXABAN 5 MG: 5 TABLET, FILM COATED ORAL at 13:06

## 2021-12-19 RX ADMIN — PANTOPRAZOLE SODIUM 40 MG: 40 INJECTION, POWDER, FOR SOLUTION INTRAVENOUS at 09:25

## 2021-12-19 RX ADMIN — Medication 10 ML: at 09:26

## 2021-12-19 RX ADMIN — MORPHINE SULFATE 2 MG: 2 INJECTION, SOLUTION INTRAMUSCULAR; INTRAVENOUS at 20:31

## 2021-12-19 RX ADMIN — HYDRALAZINE HYDROCHLORIDE 10 MG: 20 INJECTION INTRAMUSCULAR; INTRAVENOUS at 20:30

## 2021-12-19 RX ADMIN — MAGNESIUM SULFATE HEPTAHYDRATE 2000 MG: 2 INJECTION, SOLUTION INTRAVENOUS at 09:26

## 2021-12-19 RX ADMIN — SODIUM CHLORIDE, PRESERVATIVE FREE 10 ML: 5 INJECTION INTRAVENOUS at 09:26

## 2021-12-19 RX ADMIN — SODIUM CHLORIDE, PRESERVATIVE FREE 10 ML: 5 INJECTION INTRAVENOUS at 20:31

## 2021-12-19 ASSESSMENT — PAIN DESCRIPTION - FREQUENCY: FREQUENCY: INTERMITTENT

## 2021-12-19 ASSESSMENT — PAIN SCALES - WONG BAKER: WONGBAKER_NUMERICALRESPONSE: 0

## 2021-12-19 ASSESSMENT — PAIN DESCRIPTION - ONSET: ONSET: ON-GOING

## 2021-12-19 ASSESSMENT — PAIN DESCRIPTION - DESCRIPTORS: DESCRIPTORS: ACHING;CRAMPING

## 2021-12-19 ASSESSMENT — PAIN SCALES - GENERAL
PAINLEVEL_OUTOF10: 3
PAINLEVEL_OUTOF10: 7
PAINLEVEL_OUTOF10: 0

## 2021-12-19 ASSESSMENT — PAIN - FUNCTIONAL ASSESSMENT: PAIN_FUNCTIONAL_ASSESSMENT: PREVENTS OR INTERFERES SOME ACTIVE ACTIVITIES AND ADLS

## 2021-12-19 ASSESSMENT — PAIN DESCRIPTION - ORIENTATION: ORIENTATION: MID

## 2021-12-19 ASSESSMENT — PAIN DESCRIPTION - LOCATION: LOCATION: ABDOMEN

## 2021-12-19 ASSESSMENT — PAIN DESCRIPTION - PROGRESSION: CLINICAL_PROGRESSION: NOT CHANGED

## 2021-12-19 ASSESSMENT — PAIN DESCRIPTION - PAIN TYPE: TYPE: SURGICAL PAIN

## 2021-12-19 NOTE — PROGRESS NOTES
Clinical Pharmacy Note  Heparin Dosing       Lab Results   Component Value Date    APTT 77.9 12/18/2021     Lab Results   Component Value Date    HGB 8.5 12/18/2021    HCT 28.4 12/18/2021     12/18/2021       Current Infusion Rate: 1380 units/hr    Plan:  Rate: Continue 1380 units/hr  Next aPTT: 0600  12/19/21    Pharmacy will continue to monitor and adjust based on aPTT results.     Dakota Del Valle [facility]  12/18/2021 11:24 PM

## 2021-12-19 NOTE — PROGRESS NOTES
[facility] General Surgery                                   Daily Progress Note                                                         Pt Name: Sylvie Clemons  Medical Record Number: [id number]  Date of Birth 1945   Today's Date: 12/19/2021  Chief Complaint   Patient presents with    Abdominal Pain        ASSESSMENT/PLAN  Recurring sigmoid volvulus  -12/15/2021 sigmoid colectomy with anastomosis  -small amount of flatus but no BM  -continue liquids for now      Milad Reed is resting in bed, no acute distress. Pain controlled. Some gas but no BM. OBJECTIVE  VITALS:  height is 5' 9\" (1.753 m) and weight is 216 lb 14.9 oz (98.4 kg). His temperature is 98.6 °F (37 °C). His blood pressure is 180/58 (abnormal) and his pulse is 57. His respiration is 16 and oxygen saturation is 97%. INTAKE/OUTPUT:      Intake/Output Summary (Last 24 hours) at 12/19/2021 1045  Last data filed at 12/18/2021 2154  Gross per 24 hour   Intake --   Output 275 ml   Net -275 ml     GENERAL: alert, cooperative, no distress  LUNGS: clear to ausculation, without wheezes, rales or rhonci  HEART: normal rate and regular rhythm  ABDOMEN: Soft, appropriately tender, non distended. Wound clean  EXTREMITY: no cyanosis, clubbing or edema    I/O last 3 completed shifts:  In: -   Out: 275 [Urine:275]      LABS  Recent Labs     12/16/21  1205 12/17/21  0434 12/19/21  0439   WBC  --    < > 6.7   HGB  --    < > 7.9*   HCT  --    < > 24.6*   PLT  --    < > 142   NA  --    < > 141   K  --    < > 3.5   CL  --    < > 112*   CO2  --    < > 16*   BUN  --    < > 18   CREATININE  --    < > 1.6*   MG  --    < > 1.50*   PHOS  --    < > 2.9   CALCIUM  --    < > 8.0*   NITRU Negative  --   --    COLORU YELLOW  --   --     < > = values in this interval not displayed.        EDUCATION  Patient educated about Disease Process, Medications, Smoking Cessation, Oxygenation, Incentive Spirometry and Deep Breath and Cough, Diabetes, Hyperlipidemia, Smoking Cessation, Nutrition, Exercise and Hypertension    Electronically signed by Betty Chi MD on 12/19/2021 at [facility]   [phone] Office  [phone] Fax

## 2021-12-19 NOTE — PROGRESS NOTES
Clinical Pharmacy Note  Heparin Dosing       Lab Results   Component Value Date    APTT 80.7 12/19/2021     Lab Results   Component Value Date    HGB 7.9 12/19/2021    HCT 24.6 12/19/2021     12/19/2021       Current Infusion Rate: 1380 units/hr    Plan:    No change in rate. Continue: 1380 units/hr  Next aPTT: 0600  12/20/21    Pharmacy will continue to monitor and adjust based on aPTT results.     Jessica PolancoD, BCPS  12/19/2021  10:36 AM

## 2021-12-19 NOTE — PLAN OF CARE
Problem: Falls - Risk of:  Goal: Absence of physical injury  Description: Absence of physical injury  Outcome: Ongoing     Problem: Skin Integrity:  Goal: Will show no infection signs and symptoms  Description: Will show no infection signs and symptoms  Outcome: Ongoing     Problem: Pain:  Goal: Pain level will decrease  Description: Pain level will decrease  Outcome: Ongoing

## 2021-12-20 LAB
ALBUMIN SERPL-MCNC: 2.7 G/DL (ref 3.4–5)
ANION GAP SERPL CALCULATED.3IONS-SCNC: 13 MMOL/L (ref 3–16)
BUN BLDV-MCNC: 19 MG/DL (ref 7–20)
CALCIUM SERPL-MCNC: 8.4 MG/DL (ref 8.3–10.6)
CHLORIDE BLD-SCNC: 110 MMOL/L (ref 99–110)
CO2: 15 MMOL/L (ref 21–32)
CREAT SERPL-MCNC: 1.6 MG/DL (ref 0.8–1.3)
GFR AFRICAN AMERICAN: 51
GFR NON-AFRICAN AMERICAN: 42
GLUCOSE BLD-MCNC: 111 MG/DL (ref 70–99)
GLUCOSE BLD-MCNC: 140 MG/DL (ref 70–99)
GLUCOSE BLD-MCNC: 163 MG/DL (ref 70–99)
GLUCOSE BLD-MCNC: 164 MG/DL (ref 70–99)
GLUCOSE BLD-MCNC: 180 MG/DL (ref 70–99)
GLUCOSE BLD-MCNC: 90 MG/DL (ref 70–99)
GLUCOSE BLD-MCNC: 98 MG/DL (ref 70–99)
HCT VFR BLD CALC: 25.1 % (ref 40.5–52.5)
HEMOGLOBIN: 8.1 G/DL (ref 13.5–17.5)
MAGNESIUM: 1.7 MG/DL (ref 1.8–2.4)
MCH RBC QN AUTO: 29.3 PG (ref 26–34)
MCHC RBC AUTO-ENTMCNC: 32.3 G/DL (ref 31–36)
MCV RBC AUTO: 90.8 FL (ref 80–100)
PDW BLD-RTO: 15.6 % (ref 12.4–15.4)
PERFORMED ON: ABNORMAL
PERFORMED ON: NORMAL
PHOSPHORUS: 2.7 MG/DL (ref 2.5–4.9)
PLATELET # BLD: 164 K/UL (ref 135–450)
PMV BLD AUTO: 10 FL (ref 5–10.5)
POTASSIUM REFLEX MAGNESIUM: 3.3 MMOL/L (ref 3.5–5.1)
POTASSIUM SERPL-SCNC: 3.3 MMOL/L (ref 3.5–5.1)
RBC # BLD: 2.77 M/UL (ref 4.2–5.9)
SODIUM BLD-SCNC: 138 MMOL/L (ref 136–145)
WBC # BLD: 6.3 K/UL (ref 4–11)

## 2021-12-20 PROCEDURE — 6370000000 HC RX 637 (ALT 250 FOR IP): Performed by: SURGERY

## 2021-12-20 PROCEDURE — C9113 INJ PANTOPRAZOLE SODIUM, VIA: HCPCS | Performed by: SURGERY

## 2021-12-20 PROCEDURE — 80069 RENAL FUNCTION PANEL: CPT

## 2021-12-20 PROCEDURE — 6370000000 HC RX 637 (ALT 250 FOR IP): Performed by: INTERNAL MEDICINE

## 2021-12-20 PROCEDURE — 2580000003 HC RX 258: Performed by: SURGERY

## 2021-12-20 PROCEDURE — 97530 THERAPEUTIC ACTIVITIES: CPT | Performed by: PHYSICAL THERAPIST

## 2021-12-20 PROCEDURE — 36415 COLL VENOUS BLD VENIPUNCTURE: CPT

## 2021-12-20 PROCEDURE — 6360000002 HC RX W HCPCS: Performed by: SURGERY

## 2021-12-20 PROCEDURE — 6370000000 HC RX 637 (ALT 250 FOR IP): Performed by: HOSPITALIST

## 2021-12-20 PROCEDURE — 85027 COMPLETE CBC AUTOMATED: CPT

## 2021-12-20 PROCEDURE — 6360000002 HC RX W HCPCS: Performed by: HOSPITALIST

## 2021-12-20 PROCEDURE — 2580000003 HC RX 258: Performed by: HOSPITALIST

## 2021-12-20 PROCEDURE — 94760 N-INVAS EAR/PLS OXIMETRY 1: CPT

## 2021-12-20 PROCEDURE — 2060000000 HC ICU INTERMEDIATE R&B

## 2021-12-20 PROCEDURE — 99024 POSTOP FOLLOW-UP VISIT: CPT | Performed by: SURGERY

## 2021-12-20 PROCEDURE — 83735 ASSAY OF MAGNESIUM: CPT

## 2021-12-20 PROCEDURE — 97530 THERAPEUTIC ACTIVITIES: CPT

## 2021-12-20 RX ORDER — AMLODIPINE BESYLATE 10 MG/1
10 TABLET ORAL DAILY
Status: DISCONTINUED | OUTPATIENT
Start: 2021-12-20 | End: 2021-12-21 | Stop reason: HOSPADM

## 2021-12-20 RX ORDER — NICOTINE POLACRILEX 4 MG
15 LOZENGE BUCCAL PRN
Status: DISCONTINUED | OUTPATIENT
Start: 2021-12-20 | End: 2021-12-20 | Stop reason: SDUPTHER

## 2021-12-20 RX ORDER — HYDROCODONE BITARTRATE AND ACETAMINOPHEN 5; 325 MG/1; MG/1
1 TABLET ORAL EVERY 6 HOURS PRN
Status: DISCONTINUED | OUTPATIENT
Start: 2021-12-20 | End: 2021-12-21 | Stop reason: HOSPADM

## 2021-12-20 RX ORDER — DEXTROSE MONOHYDRATE 50 MG/ML
100 INJECTION, SOLUTION INTRAVENOUS PRN
Status: DISCONTINUED | OUTPATIENT
Start: 2021-12-20 | End: 2021-12-20 | Stop reason: SDUPTHER

## 2021-12-20 RX ORDER — POTASSIUM CHLORIDE 750 MG/1
40 TABLET, FILM COATED, EXTENDED RELEASE ORAL ONCE
Status: DISCONTINUED | OUTPATIENT
Start: 2021-12-20 | End: 2021-12-21 | Stop reason: HOSPADM

## 2021-12-20 RX ORDER — DEXTROSE MONOHYDRATE 25 G/50ML
12.5 INJECTION, SOLUTION INTRAVENOUS PRN
Status: DISCONTINUED | OUTPATIENT
Start: 2021-12-20 | End: 2021-12-21 | Stop reason: HOSPADM

## 2021-12-20 RX ADMIN — APIXABAN 5 MG: 5 TABLET, FILM COATED ORAL at 21:46

## 2021-12-20 RX ADMIN — INSULIN LISPRO 1 UNITS: 100 INJECTION, SOLUTION INTRAVENOUS; SUBCUTANEOUS at 21:46

## 2021-12-20 RX ADMIN — SODIUM CHLORIDE, PRESERVATIVE FREE 10 ML: 5 INJECTION INTRAVENOUS at 09:44

## 2021-12-20 RX ADMIN — PANTOPRAZOLE SODIUM 40 MG: 40 INJECTION, POWDER, FOR SOLUTION INTRAVENOUS at 09:44

## 2021-12-20 RX ADMIN — MORPHINE SULFATE 2 MG: 2 INJECTION, SOLUTION INTRAMUSCULAR; INTRAVENOUS at 10:32

## 2021-12-20 RX ADMIN — INSULIN LISPRO 2 UNITS: 100 INJECTION, SOLUTION INTRAVENOUS; SUBCUTANEOUS at 10:32

## 2021-12-20 RX ADMIN — INSULIN LISPRO 2 UNITS: 100 INJECTION, SOLUTION INTRAVENOUS; SUBCUTANEOUS at 17:54

## 2021-12-20 RX ADMIN — AMLODIPINE BESYLATE 10 MG: 10 TABLET ORAL at 13:00

## 2021-12-20 RX ADMIN — POTASSIUM CHLORIDE 40 MEQ: 1500 TABLET, EXTENDED RELEASE ORAL at 10:19

## 2021-12-20 RX ADMIN — APIXABAN 5 MG: 5 TABLET, FILM COATED ORAL at 09:44

## 2021-12-20 RX ADMIN — SODIUM CHLORIDE, PRESERVATIVE FREE 10 ML: 5 INJECTION INTRAVENOUS at 09:54

## 2021-12-20 RX ADMIN — SODIUM CHLORIDE, PRESERVATIVE FREE 10 ML: 5 INJECTION INTRAVENOUS at 21:46

## 2021-12-20 RX ADMIN — Medication 10 ML: at 09:54

## 2021-12-20 RX ADMIN — SODIUM CHLORIDE: 9 INJECTION, SOLUTION INTRAVENOUS at 21:53

## 2021-12-20 ASSESSMENT — PAIN SCALES - GENERAL: PAINLEVEL_OUTOF10: 6

## 2021-12-20 ASSESSMENT — PAIN - FUNCTIONAL ASSESSMENT: PAIN_FUNCTIONAL_ASSESSMENT: PREVENTS OR INTERFERES SOME ACTIVE ACTIVITIES AND ADLS

## 2021-12-20 ASSESSMENT — PAIN DESCRIPTION - DESCRIPTORS: DESCRIPTORS: ACHING

## 2021-12-20 ASSESSMENT — PAIN DESCRIPTION - PAIN TYPE: TYPE: SURGICAL PAIN

## 2021-12-20 ASSESSMENT — PAIN DESCRIPTION - FREQUENCY: FREQUENCY: INTERMITTENT

## 2021-12-20 ASSESSMENT — PAIN DESCRIPTION - ONSET: ONSET: ON-GOING

## 2021-12-20 ASSESSMENT — PAIN DESCRIPTION - PROGRESSION: CLINICAL_PROGRESSION: NOT CHANGED

## 2021-12-20 ASSESSMENT — ENCOUNTER SYMPTOMS: NAUSEA: 0

## 2021-12-20 ASSESSMENT — PAIN DESCRIPTION - LOCATION: LOCATION: ABDOMEN

## 2021-12-20 ASSESSMENT — PAIN DESCRIPTION - ORIENTATION: ORIENTATION: LOWER;MID

## 2021-12-20 NOTE — PROGRESS NOTES
Hospitalist  Progress Note       Ramin Daigle is a 64 y.o. male   1945     SUBJECTIVE:   Patient seen and examined says doing well no nausea vomiting surgical evaluation noted  OBJECTIVE:    Review of Systems:  General appearance: alert, appears stated age and cooperative  Skin: Skin color, texture, normal. No rashes or lesions  HEENT: No nose bleed, headache, vision problems  CV: C/O chest pain, tightness, pressure,   Respiratory: C/o no SOB, LOPEZ, Orthopnea, PND  GI: No abdominal pain, black stool, bloating  Limbs: No c/o edema, pain, swelling, intermittent claudication, joint pains  Neuro: No dizziness, lightheadedness, syncope, gait problems, memory problems  Psych: grossly normal. No SI/depression. Vitals:   Blood pressure (!) 186/69, pulse 54, temperature 97.7 °F (36.5 °C), temperature source Oral, resp. rate 17, height 5' 9\" (1.753 m), weight 217 lb 2.5 oz (98.5 kg), SpO2 98 %.     HEENT: AT, NC, PERRLA  Neck: No JVD  Heart: S1 S2 audible, no murmur   Lungs: CTA   Abdomen: Nontender   Limbs: No edema   CNS: no focal deficit      Past Medical History:   Diagnosis Date    Cerebral artery occlusion with cerebral infarction ([address]     Diabetes mellitus [facility] [address] [facility]     Hypertension         Patient Active Problem List   Diagnosis    Sigmoid volvulus (HCC)    Abdominal pain        No Known Allergies     Current Inpatient Medications:    Current Facility-Administered Medications   Medication Dose Route Frequency Provider Last Rate Last Admin    potassium chloride (KLOR-CON) extended release tablet 40 mEq  40 mEq Oral Once Lul Vicente MD        HYDROcodone-acetaminophen (NORCO) 5-325 MG per tablet 1 tablet  1 tablet Oral Q6H PRN Lul Vicente MD        apixaban (ELIQUIS) tablet 5 mg  5 mg Oral BID Smith Elliott MD   5 mg at 12/20/21 0944    morphine (PF) injection 2 mg  2 mg IntraVENous Q2H PRN Smith Elliott MD   2 mg at 12/20/21 1032    Or    morphine injection 6 mg  6 mg IntraVENous Q2H PRN Bernardino Lezama MD        hydrALAZINE (APRESOLINE) injection 10 mg  10 mg IntraVENous Q6H PRN Bernardino Lezama MD   10 mg at 12/19/21 2030    cloNIDine (CATAPRES) 0.2 MG/24HR 1 patch  1 patch TransDERmal Weekly Christie Mccormick MD   1 patch at 12/20/21 0956    pantoprazole (PROTONIX) injection 40 mg  40 mg IntraVENous Daily Christie Mccormick MD   40 mg at 12/20/21 0944    And    sodium chloride (PF) 0.9 % injection 10 mL  10 mL IntraVENous Daily Christie Mccormick MD   10 mL at 12/20/21 0954    sodium chloride flush 0.9 % injection 10 mL  10 mL IntraVENous 2 times per day Christie Mccormick MD   10 mL at 12/20/21 0954    sodium chloride flush 0.9 % injection 10 mL  10 mL IntraVENous PRN Christie Mccormick MD        0.9 % sodium chloride infusion  25 mL IntraVENous PRN Christie Mccormick MD        potassium chloride Rebecca  M) extended release tablet 40 mEq  40 mEq Oral PRN Christie Mccormick MD   40 mEq at 12/20/21 1019    Or    potassium bicarb-citric acid (EFFER-K) effervescent tablet 40 mEq  40 mEq Oral PRN Christie Mccormick MD        Or    potassium chloride 10 mEq/100 mL IVPB (Peripheral Line)  10 mEq IntraVENous PRN Christie Mccormick MD   Paused at 12/15/21 1756    potassium chloride 10 mEq/100 mL IVPB (Peripheral Line)  10 mEq IntraVENous PRN Christie Mccormick MD        magnesium sulfate 2000 mg in 50 mL IVPB premix  2,000 mg IntraVENous PRN Christie Mccormick MD   Stopped at 12/19/21 1126    promethazine (PHENERGAN) tablet 12.5 mg  12.5 mg Oral Q6H PRN Christie Mccormick MD        Or    ondansetron [facility]) injection 4 mg  4 mg IntraVENous Q6H PRN Christie Mccormick MD        magnesium hydroxide (MILK OF MAGNESIA) 400 MG/5ML suspension 30 mL  30 mL Oral Daily PRN Christie Mccormick MD        acetaminophen (TYLENOL) tablet 650 mg  650 mg Oral Q6H PRN Christie Mccormick MD        Or    acetaminophen (TYLENOL) suppository 650 mg  650 mg Rectal Q6H PRN Fabiana Blanco MD        0.9 % sodium chloride infusion   IntraVENous Continuous Karen Negrete MD 50 mL/hr at 12/18/21 0600 Rate Verify at 12/18/21 0600    labetalol (NORMODYNE;TRANDATE) injection 10 mg  10 mg IntraVENous Q4H PRN Fabiana Blanco MD   10 mg at 12/18/21 1558    glucose (GLUTOSE) 40 % oral gel 15 g  15 g Oral PRN Fabiana Blanco MD        dextrose 50 % IV solution  12.5 g IntraVENous PRN Fabiana Blanco MD        glucagon (rDNA) injection 1 mg  1 mg IntraMUSCular PRN Fabiana Blanco MD        dextrose 5 % solution  100 mL/hr IntraVENous PRN Fabiana Blanco MD        insulin lispro (HUMALOG) injection vial 0-12 Units  0-12 Units SubCUTAneous 6 times per day Fabiana Blanco MD   2 Units at 12/20/21 1032           Labs:  CBC with Differential:    Lab Results   Component Value Date    WBC 6.3 12/20/2021    RBC 2.77 12/20/2021    HGB 8.1 12/20/2021    HCT 25.1 12/20/2021     12/20/2021    MCV 90.8 12/20/2021    MCH 29.3 12/20/2021    MCHC 32.3 12/20/2021    RDW 15.6 12/20/2021    SEGSPCT 63.7 07/11/2010    LYMPHOPCT 22.2 12/12/2021    MONOPCT 7.0 12/12/2021    EOSPCT 2.0 07/11/2010    BASOPCT 0.9 12/12/2021    MONOSABS 0.5 12/12/2021    LYMPHSABS 1.4 12/12/2021    EOSABS 0.3 12/12/2021    BASOSABS 0.1 12/12/2021    DIFFTYPE Auto-K 07/11/2010     CMP:    Lab Results   Component Value Date     12/20/2021    K 3.3 12/20/2021    K 3.3 12/20/2021     12/20/2021    CO2 15 12/20/2021    BUN 19 12/20/2021    CREATININE 1.6 12/20/2021    GFRAA 51 12/20/2021    GFRAA >60 07/31/2010    AGRATIO 1.0 11/13/2021    LABGLOM 42 12/20/2021    GLUCOSE 90 12/20/2021    PROT 7.7 12/12/2021    PROT 6.9 07/14/2010    LABALBU 2.7 12/20/2021    CALCIUM 8.4 12/20/2021    BILITOT 0.4 12/12/2021    ALKPHOS 138 12/12/2021    AST 11 12/12/2021    ALT 7 12/12/2021     Hepatic Function Panel:    Lab Results   Component

## 2021-12-20 NOTE — PROGRESS NOTES
Occupational Therapy  Facility/Department: [address] PROGRESSIVE CARE  Daily Treatment Note  NAME: Tianna Russo  : 1945  MRN: [id number]    Date of Service: 2021    Discharge Recommendations:  Patient would benefit from continued therapy after discharge,3-5 sessions per week  OT Equipment Recommendations  Other: defer to [address] [place] [address] scored a  on the AM-PAC ADL Inpatient form. Current research shows that an AM-PAC score of 17 or less is typically not associated with a discharge to the patient's home setting. Based on the patient's AM-PAC score and their current ADL deficits, it is recommended that the patient have 3-5 sessions per week of Occupational Therapy at d/c to increase the patient's independence. Please see assessment section for further patient specific details. If patient discharges prior to next session this note will serve as a discharge summary. Please see below for the latest assessment towards goals. Assessment   Performance deficits / Impairments: Decreased functional mobility ; Decreased ADL status; Decreased ROM; Decreased strength;Decreased safe awareness;Decreased balance;Decreased cognition;Decreased endurance  Assessment: Pt is a 63 yo M admitted with abdominal pain and s/p sigmoid colectomy with anastomosis 12/15/2021. Pt has h/o CVA with right sided weakness (~8 yrs prior). PTA, pt reportedly lives at home alone and transfers to [employer] independently and receives assist for ADLs and IADLs from [facility] aide approx 4hr/day 7 days a week. Unclear how much assist aides are providing. TODAY- pt required mod/max A x 2 for bed mobility and max A x 2 for transfers to [facility]. Pt with significant posterior lean and has difficulty bringing hips forward. Pt with limited standing tolerance/balance necessary for ADLs. Anticipate pt will require up to max A for ADLs at this time. Pt is functioning below baseline and benefit from skilled therapy.   Prognosis: Good  OT Education: OT Role;Plan of Care;ADL Adaptive Strategies;Transfer Training;Energy Conservation  REQUIRES OT FOLLOW UP: Yes  Activity Tolerance  Activity Tolerance: Patient limited by fatigue  Safety Devices  Safety Devices in place: Yes  Type of devices: Call light within reach;Nurse notified; Patient at risk for falls; Left in chair;Chair alarm in place;Gait belt         Patient Diagnosis(es): The primary encounter diagnosis was Volvulus of sigmoid colon ([facility] Diagnoses of Generalized abdominal pain and Volvulus ([facility] [address] were also pertinent to this visit. has a past medical history of Cerebral artery occlusion with cerebral infarction ([facility] [address] Diabetes mellitus ([facility] [address] and Hypertension. has a past surgical history that includes Colonoscopy (N/A, 11/13/2021); Colonoscopy (N/A, 12/12/2021); and colectomy (N/A, 12/15/2021). Restrictions  Restrictions/Precautions  Restrictions/Precautions: Fall Risk  Position Activity Restriction  Other position/activity restrictions: H/o CVA w/ right side weakness; IV     Subjective   General  Chart Reviewed: Yes  Patient assessed for rehabilitation services?: Yes  Additional Pertinent Hx: 76m admitted 12/12/2021 with recurrence of abdominal pain secondary to sigmoid volvulus, underwent endoscopic reduction, however due to recurrence Surgery was consulted. Underwent sigmoid colectomy with anastamosis 12/15  Family / Caregiver Present: No  Referring Practitioner: Terry  Diagnosis: Abdominal pain  Subjective  Subjective: Pt seen bedside and agreeable to therapy.  Pt reporting abdominal pain but did not rate  General Comment  Comments: Per RN ok for therapy      Orientation  Orientation  Overall Orientation Status: Within Functional Limits     Objective    ADL  LE Dressing: Dependent/Total  Toileting: Dependent/Total  Additional Comments: Anticipate pt will be max A for all ADLs based on balance, ROM and endurance observed        Balance  Sitting Balance: Contact guard assistance (EOB in prep for transfer)  Standing Balance  Time: posterior lean when standing; stood ~ 1-2 min with mod A of 1-2 with stedy support  Functional Mobility  Functional Mobility Comments: bed > chair via odalis stedy; recommend maxi move for transfer back to bed     Bed mobility  Rolling to Left: Maximum assistance;2 Person assistance  Rolling to Right: Maximum assistance  Supine to Sit: Maximum assistance;2 Person assistance  Sit to Supine:  (pt in chair at end of session)  Comment: Rolled R/L to change soiled depends and don clean depends prior to OOB activity. Pt able to assist with holding self in side lying when rolling to right    Transfers  Sit to stand: 2 Person assistance;Maximum assistance  Stand to sit: Maximum assistance;2 Person assistance  Transfer Comments: stood from bed  to [address] [place] [address] stedy with max A x 2. Pt with significant posterior lean and difficult bringing hips forward      Cognition  Overall Cognitive Status: Exceptions  Arousal/Alertness: Appropriate responses to stimuli  Following Commands:  Follows one step commands consistently  Attention Span: Appears intact  Memory: Decreased recall of recent events;Decreased recall of biographical Information  Safety Judgement: Decreased awareness of need for safety;Decreased awareness of need for assistance  Problem Solving: Decreased awareness of errors  Insights: Decreased awareness of deficits  Initiation: Requires cues for some  Sequencing: Requires cues for some  Cognition Comment: May be questionable historian        Plan   Plan  Times per week: 3-5  Times per day: Daily  Current Treatment Recommendations: Strengthening,Functional Mobility Training,Endurance Training,ROM,Balance Training,Safety Education & Training,Self-Care / ADL,Equipment Evaluation, Education, & procurement,Patient/Caregiver Education & Training    AM-PAC Score   AM-[facility] Inpatient Daily Activity Raw Score: 11 (12/20/21 7967)  AM-PAC Inpatient ADL T-Scale Score : 29.04 (12/20/21 1347)  ADL Inpatient CMS 0-100% Score: 70.42 (12/20/21 1347)  ADL Inpatient CMS G-Code Modifier : CL (12/20/21 1347)    Goals  Short term goals  Time Frame for Short term goals: Prior to d/c: goals ongoing  Short term goal 1: Pt will complete bed mobility w/ SBA  Short term goal 2: Pt will complete fxl transfers w/ CGA via stand/squat pivot  Short term goal 3: Pt will groom w/ setup  Short term goal 4: Pt will toilet w/ CGA  Long term goals  Time Frame for Long term goals : LTG=STG  Patient Goals   Patient goals : to go home       Therapy Time   Individual Concurrent Group Co-treatment   Time In 1305         Time Out 1335         Minutes 30         Timed Code Treatment Minutes: 30 Minutes     This note to serve as OT d/c summary if pt is d/c-ed prior to next therapy session.     Courtney Kumari, OTR/L

## 2021-12-20 NOTE — PLAN OF CARE
Problem: Falls - Risk of:  Goal: Will remain free from falls  Description: Will remain free from falls  12/20/2021 0151 by Cuauhtemoc Haines RN  Outcome: Ongoing  12/19/2021 1538 by Lexa Starr RN  Outcome: Ongoing  Goal: Absence of physical injury  Description: Absence of physical injury  12/20/2021 0151 by Cuauhtemoc Haines RN  Outcome: Ongoing  12/19/2021 1538 by Lexa Starr RN  Outcome: Ongoing     Problem: Skin Integrity:  Goal: Will show no infection signs and symptoms  Description: Will show no infection signs and symptoms  12/20/2021 0151 by Cuauhtemoc Haines RN  Outcome: Ongoing  12/19/2021 1538 by Lexa Starr RN  Outcome: Ongoing  Goal: Absence of new skin breakdown  Description: Absence of new skin breakdown  12/20/2021 0151 by Cuauhtemoc Haines RN  Outcome: Ongoing  12/19/2021 1538 by Lexa Starr RN  Outcome: Ongoing     Problem: Nutrition  Goal: Optimal nutrition therapy  12/20/2021 0151 by Cuauhtemoc Haines RN  Outcome: Ongoing  12/19/2021 1538 by Lexa Starr RN  Outcome: Ongoing     Problem: Pain:  Goal: Pain level will decrease  Description: Pain level will decrease  12/20/2021 0151 by Cuauhtemoc Haines RN  Outcome: Ongoing  12/19/2021 1538 by Lexa Starr RN  Outcome: Ongoing  Goal: Control of acute pain  Description: Control of acute pain  12/20/2021 0151 by Cuauhtemoc Haines RN  Outcome: Ongoing  12/19/2021 1538 by Lexa Starr RN  Outcome: Ongoing  Goal: Control of chronic pain  Description: Control of chronic pain  12/20/2021 0151 by Cuauhtemoc Haines RN  Outcome: Ongoing  12/19/2021 1538 by Lexa Starr RN  Outcome: Ongoing

## 2021-12-20 NOTE — PROGRESS NOTES
Progress Note  Date:2021       Room:P4X-6521/5107-01  Patient Name:Kam Seymour     YOB: 1945     Age:76 y.o. Subjective    Subjective:  Symptoms:  Improved. Diet:  Adequate intake. No nausea. Activity level: Returning to normal.       Review of Systems   Gastrointestinal: Negative for nausea. Objective         Vitals Last 24 Hours:  TEMPERATURE:  Temp  Av.9 °F (36.6 °C)  Min: 97.2 °F (36.2 °C)  Max: 98.4 °F (36.9 °C)  RESPIRATIONS RANGE: Resp  Av.3  Min: 16  Max: 17  PULSE OXIMETRY RANGE: SpO2  Av.4 %  Min: 96 %  Max: 99 %  PULSE RANGE: Pulse  Av.5  Min: 51  Max: 60  BLOOD PRESSURE RANGE: Systolic (73BKN), UVK:888 , Min:123 , QKI:691   ; Diastolic (96HCW), LST:15, Min:60, Max:92    I/O (24Hr): Intake/Output Summary (Last 24 hours) at 2021 1541  Last data filed at 2021 1844  Gross per 24 hour   Intake --   Output 500 ml   Net -500 ml     Objective  Labs/Imaging/Diagnostics    Labs:  CBC:  Recent Labs     21  1159 21   WBC 6.6  --   --  6.7 6.3   RBC 2.69*  --   --  2.72* 2.77*   HGB 7.9*   < > 8.5* 7.9* 8.1*   HCT 24.6*   < > 28.4* 24.6* 25.1*   MCV 91.5  --   --  90.4 90.8   RDW 15.8*  --   --  15.7* 15.6*   *  --   --  142 164    < > = values in this interval not displayed. CHEMISTRIES:  Recent Labs     21  0441 21  0439 21  043     --  141 138   K 3.6  3.6   < > 3.5  3.5 3.3*  3.3*   *  --  112* 110   CO2 15*  --  16* 15*   BUN 22*  --  18 19   CREATININE 1.8*  --  1.6* 1.6*   GLUCOSE 86  --  101* 90   PHOS 2.3*  --  2.9 2.7   MG  --   --  1.50* 1.70*    < > = values in this interval not displayed. PT/INR:No results for input(s): PROTIME, INR in the last 72 hours.   APTT:  Recent Labs     21  1559 21  2228 21  0439   APTT 78.6* 77.9* 80.7*     LIVER PROFILE:No results for input(s): AST, ALT, BILIDIR, BILITOT, ALKPHOS in the last 72 hours. Imaging Last 24 Hours:  No results found.   Assessment//Plan           Hospital Problems           Last Modified POA    Sigmoid volvulus ([address] 12/15/2021 Yes    Abdominal pain 12/12/2021 Yes        Assessment & Plan    Doing well postop from sigmoid colectomy   Reports BM today    Advance diet     Electronically signed by Barrera Anderson MD on 12/20/2021 at 3:41 PM    Electronically signed by Barrera Anderson MD on 12/20/21 at 3:41 PM EST

## 2021-12-20 NOTE — PROGRESS NOTES
Physical Therapy  Facility/Department: [address] PROGRESSIVE CARE  Daily Treatment Note  NAME: Sylvie Clemons  : 1945  MRN: [id number]    Date of Service: 2021    Discharge Recommendations:  3-5 sessions per week,Patient would benefit from continued therapy after discharge   PT Equipment Recommendations  Equipment Needed: No  Other: defer to next level of care  Sylvie Clemons scored a 9/24 on the AM-PAC short mobility form. Current research shows that an AM-PAC score of 17 or less is typically not associated with a discharge to the patient's home setting. Based on the patient's AM-PAC score and their current functional mobility deficits, it is recommended that the patient have 3-5 sessions per week of Physical Therapy at d/c to increase the patient's independence. Please see assessment section for further patient specific details. If patient discharges prior to next session this note will serve as a discharge summary. Please see below for the latest assessment towards goals. Assessment   Body structures, Functions, Activity limitations: Decreased functional mobility   Assessment: Patient is a 57-year-old male with past medical history of diabetes mellitus who presented to hospital on 21 for abdominal pain and diarrhea. Pt underwent sigmoid volvulus decompression on 21. S/p SIGMOID COLECTOMY  With anastomosis on 12/15. Prior to admission, pt living alone in home setting with ramped entrance, reports home health aide for 3 hours per day, 7 days/week. .  Pt reports that he has assist daily getting in/out of bed but indep with pivot transfers. Today, pt required maxAx2 for bed mob and to stand to odalis [employer] from EOB. pt needing mod A of 1-2 to stand on stedy due to posterior LOB. He is below his functional baseline and is unsafe to return home. Recommend continued therapy 3-5x/wk at discharge to assist pt in regaining his Ind with functional tasks.   Treatment Diagnosis: impaired mobility  Prognosis: Fair  Decision Making: Medium Complexity  Clinical Presentation: evolving  PT Education: General Safety  Patient Education: reviewed call light and not getting up without assist  REQUIRES PT FOLLOW UP: Yes  Activity Tolerance  Activity Tolerance: Patient limited by endurance     Patient Diagnosis(es): The primary encounter diagnosis was Volvulus of sigmoid colon ([facility] [address] Diagnoses of Generalized abdominal pain and Volvulus ([facility] [address] were also pertinent to this visit. has a past medical history of Cerebral artery occlusion with cerebral infarction ([facility] [address] Diabetes mellitus ([facility] [address] and Hypertension. has a past surgical history that includes Colonoscopy (N/A, 11/13/2021); Colonoscopy (N/A, 12/12/2021); and colectomy (N/A, 12/15/2021). Social/Functional History  Lives With: Alone  Type of Home: House  Home Layout: Two level,Able to Live on Main level with bedroom/bathroom  Home Access: Ramped entrance  Bathroom Shower/Tub: Walk-in shower,Shower chair with back  Bathroom Toilet: Handicap height  Bathroom Equipment: Grab bars in shower,Grab bars around toilet  Home Equipment: Wheelchair-electric  ADL Assistance: Needs assistance (Aide assists with bathing/dressing)  Homemaking Assistance: Needs assistance (Aide completes)  Ambulation Assistance:  (Uses w/c)  Transfer Assistance: Independent  Active : No  Patient's  Info: Aide assists (?)  Additional Comments: Sleeps in a hospital bed. Questionable historian. Restrictions  Restrictions/Precautions  Restrictions/Precautions: Fall Risk  Position Activity Restriction  Other position/activity restrictions: H/o CVA w/ right side weakness; IV  Subjective   General  Chart Reviewed: Yes  Additional Pertinent Hx: Patient is a 68-year-old male with past medical history of diabetes mellitus who presented to hospital on 12/12/21 for abdominal pain and diarrhea. Pt underwent sigmoid volvulus decompression on 12/12/21.   S/p SIGMOID COLECTOMY  With anastomosis on 12/15. Therapy re-ordered by Jessy Cosby on 12/16 to resume PT treatment. Response To Previous Treatment: Patient with no complaints from previous session.   Family / Caregiver Present: No  Referring Practitioner: Melissa Felipe MD  Subjective  Subjective: Pt agreeable to getting up with therapy; no c/o pain during this session          Orientation  Orientation  Overall Orientation Status: Within Functional Limits  Cognition      Objective   Bed mobility  Supine to Sit: Maximum assistance;2 Person assistance  Transfers  Sit to Stand: Maximum Assistance;2 Person Assistance (with stedy)  Stand to sit: Maximum Assistance;2 Person Assistance (from stedy)  Bed to Chair: Dependent/Total (with stedy)  Ambulation  Ambulation?: No (pt reported he walked with his therapist at home; unsure of accuracy of information)     Balance  Comments: CGA for sitting EOB; mod A of 1-2 for standing on stedy            Comment: cleansed pt and changed adult brief due to being soiled upon entering; cream applied to reddened areas on his buttocks; positioned in chair for comfort with all needs in reach and LEs elevated; assisted with pt eating his sherbet as he is unable to hold the cup with his L hand              G-Code     OutComes Score                                                     AM-PAC Score  AM-PAC Inpatient Mobility Raw Score : 9 (12/20/21 1349)  AM-PAC Inpatient T-Scale Score : 30.55 (12/20/21 1349)  Mobility Inpatient CMS 0-100% Score: 81.38 (12/20/21 1349)  Mobility Inpatient CMS G-Code Modifier : CM (12/20/21 1349)          Goals  Short term goals  Time Frame for Short term goals: by acute discharge--goals ongoing  Short term goal 1: bed mobiltiy with min A  Short term goal 2: stand pivot vs squat pviot with min A  Patient Goals   Patient goals : none stated    Plan    Plan  Times per week: 3-5x/week  Specific instructions for Next Treatment: cotx  Current Treatment Recommendations: Aroldo Mcnamara Mobility Training,Transfer Pieter Henningd Training,Patient/Caregiver Education & Training,Safety Education & Training,Equipment Evaluation, Education, & procurement,Neuromuscular Re-education  Safety Devices  Type of devices: Call light within reach,Chair alarm in place,Left in chair,Nurse notified,Gait belt,All fall risk precautions in place  Restraints  Initially in place: No     Therapy Time   Individual Concurrent Group Co-treatment   Time In 3596         Time Out 6689         Minutes 44                 BETH MCCLAIN PT    Electronically signed by BETH MCCLAIN PT on 12/20/2021 at 1:50 PM

## 2021-12-21 VITALS
BODY MASS INDEX: 35.23 KG/M2 | HEIGHT: 69 IN | WEIGHT: 237.88 LBS | RESPIRATION RATE: 16 BRPM | SYSTOLIC BLOOD PRESSURE: 149 MMHG | TEMPERATURE: 97.9 F | HEART RATE: 61 BPM | OXYGEN SATURATION: 98 % | DIASTOLIC BLOOD PRESSURE: 65 MMHG

## 2021-12-21 LAB
ALBUMIN SERPL-MCNC: 2.6 G/DL (ref 3.4–5)
ANION GAP SERPL CALCULATED.3IONS-SCNC: 11 MMOL/L (ref 3–16)
BUN BLDV-MCNC: 20 MG/DL (ref 7–20)
CALCIUM SERPL-MCNC: 8.2 MG/DL (ref 8.3–10.6)
CHLORIDE BLD-SCNC: 110 MMOL/L (ref 99–110)
CO2: 15 MMOL/L (ref 21–32)
CREAT SERPL-MCNC: 1.6 MG/DL (ref 0.8–1.3)
GFR AFRICAN AMERICAN: 51
GFR NON-AFRICAN AMERICAN: 42
GLUCOSE BLD-MCNC: 103 MG/DL (ref 70–99)
GLUCOSE BLD-MCNC: 128 MG/DL (ref 70–99)
GLUCOSE BLD-MCNC: 95 MG/DL (ref 70–99)
HCT VFR BLD CALC: 25.8 % (ref 40.5–52.5)
HEMOGLOBIN: 8.5 G/DL (ref 13.5–17.5)
MCH RBC QN AUTO: 29.7 PG (ref 26–34)
MCHC RBC AUTO-ENTMCNC: 32.7 G/DL (ref 31–36)
MCV RBC AUTO: 90.6 FL (ref 80–100)
PDW BLD-RTO: 15.1 % (ref 12.4–15.4)
PERFORMED ON: ABNORMAL
PERFORMED ON: ABNORMAL
PHOSPHORUS: 2.5 MG/DL (ref 2.5–4.9)
PLATELET # BLD: 186 K/UL (ref 135–450)
PMV BLD AUTO: 9.4 FL (ref 5–10.5)
POTASSIUM REFLEX MAGNESIUM: 3.6 MMOL/L (ref 3.5–5.1)
POTASSIUM SERPL-SCNC: 3.6 MMOL/L (ref 3.5–5.1)
RBC # BLD: 2.85 M/UL (ref 4.2–5.9)
SODIUM BLD-SCNC: 136 MMOL/L (ref 136–145)
WBC # BLD: 8 K/UL (ref 4–11)

## 2021-12-21 PROCEDURE — 80069 RENAL FUNCTION PANEL: CPT

## 2021-12-21 PROCEDURE — 36415 COLL VENOUS BLD VENIPUNCTURE: CPT

## 2021-12-21 PROCEDURE — 97530 THERAPEUTIC ACTIVITIES: CPT | Performed by: PHYSICAL THERAPIST

## 2021-12-21 PROCEDURE — 97530 THERAPEUTIC ACTIVITIES: CPT

## 2021-12-21 PROCEDURE — APPNB60 APP NON BILLABLE TIME 46-60 MINS: Performed by: NURSE PRACTITIONER

## 2021-12-21 PROCEDURE — 85027 COMPLETE CBC AUTOMATED: CPT

## 2021-12-21 PROCEDURE — 6370000000 HC RX 637 (ALT 250 FOR IP): Performed by: HOSPITALIST

## 2021-12-21 PROCEDURE — 94760 N-INVAS EAR/PLS OXIMETRY 1: CPT

## 2021-12-21 PROCEDURE — 6370000000 HC RX 637 (ALT 250 FOR IP): Performed by: INTERNAL MEDICINE

## 2021-12-21 RX ORDER — GUAIFENESIN/DEXTROMETHORPHAN 100-10MG/5
5 SYRUP ORAL EVERY 4 HOURS PRN
Status: DISCONTINUED | OUTPATIENT
Start: 2021-12-21 | End: 2021-12-21 | Stop reason: HOSPADM

## 2021-12-21 RX ORDER — CLONIDINE 0.2 MG/24H
1 PATCH, EXTENDED RELEASE TRANSDERMAL WEEKLY
Qty: 4 PATCH | Refills: 1
Start: 2021-12-27 | End: 2022-09-24

## 2021-12-21 RX ORDER — POLYETHYLENE GLYCOL 3350 17 G/17G
17 POWDER, FOR SOLUTION ORAL DAILY
Qty: 527 G | Refills: 1 | Status: SHIPPED | OUTPATIENT
Start: 2021-12-21 | End: 2022-01-20

## 2021-12-21 RX ORDER — PANTOPRAZOLE SODIUM 40 MG/1
40 TABLET, DELAYED RELEASE ORAL DAILY
Status: DISCONTINUED | OUTPATIENT
Start: 2021-12-21 | End: 2021-12-21 | Stop reason: HOSPADM

## 2021-12-21 RX ORDER — HYDROCODONE BITARTRATE AND ACETAMINOPHEN 5; 325 MG/1; MG/1
1 TABLET ORAL EVERY 6 HOURS PRN
Qty: 20 TABLET | Refills: 0 | Status: SHIPPED | OUTPATIENT
Start: 2021-12-21 | End: 2021-12-26

## 2021-12-21 RX ORDER — HYDROCODONE BITARTRATE AND ACETAMINOPHEN 5; 325 MG/1; MG/1
1 TABLET ORAL EVERY 6 HOURS PRN
Qty: 20 TABLET | Refills: 0
Start: 2021-12-21 | End: 2021-12-21

## 2021-12-21 RX ADMIN — AMLODIPINE BESYLATE 10 MG: 10 TABLET ORAL at 08:06

## 2021-12-21 RX ADMIN — APIXABAN 5 MG: 5 TABLET, FILM COATED ORAL at 08:06

## 2021-12-21 RX ADMIN — PANTOPRAZOLE SODIUM 40 MG: 40 TABLET, DELAYED RELEASE ORAL at 08:06

## 2021-12-21 RX ADMIN — GUAIFENESIN SYRUP AND DEXTROMETHORPHAN 5 ML: 100; 10 SYRUP ORAL at 16:40

## 2021-12-21 ASSESSMENT — PAIN DESCRIPTION - ORIENTATION: ORIENTATION: LEFT

## 2021-12-21 ASSESSMENT — PAIN SCALES - GENERAL
PAINLEVEL_OUTOF10: 0
PAINLEVEL_OUTOF10: 2

## 2021-12-21 ASSESSMENT — PAIN DESCRIPTION - LOCATION: LOCATION: ARM

## 2021-12-21 NOTE — PROGRESS NOTES
Physical Therapy  Facility/Department: [facility] PROGRESSIVE CARE  Daily Treatment Note  NAME: Liu Aguayo  : 1945  MRN: [id number]    Date of Service: 2021    Discharge Recommendations:  3-5 sessions per week,Patient would benefit from continued therapy after discharge - pt declining and has discharge orders  PT Equipment Recommendations  Equipment Needed: No  Katelyn Bowers scored a  on the AM-PAC short mobility form. Current research shows that an AM-PAC score of 17 or less is typically not associated with a discharge to the patient's home setting. Based on the patient's AM-PAC score and their current functional mobility deficits, it is recommended that the patient have 3-5 sessions per week of Physical Therapy at d/c to increase the patient's independence. Please see assessment section for further patient specific details. If patient discharges prior to next session this note will serve as a discharge summary. Please see below for the latest assessment towards goals. Assessment   Body structures, Functions, Activity limitations: Decreased functional mobility   Assessment: Patient is a 75-year-old male with past medical history of diabetes mellitus who presented to hospital on 21 for abdominal pain and diarrhea. Pt underwent sigmoid volvulus decompression on 21. S/p SIGMOID COLECTOMY  With anastomosis on 12/15. Prior to admission, pt living alone in home setting with ramped entrance, reports home health aide for 3 hours per day, 7 days/week and reports a friend that lives close by assists in evening. .  Pt reports that he has assist daily getting in/out of bed but indep with pivot transfers. Pt provides inconsistent information regarding home situation but maintains he is not going to a nursing home and he will manage at home. Today, pt required mod/maxAx2 for bed mob and to stand to [address] [place] [address] from chair.  Attempted a sit pivot transfer but pt only able to transfer partially to lowered arm of chair and part on seat of chair requiring odalis d[place] to get pt safely into chair to eat his lunch. He is below his functional baseline and is unsafe to return home. Recommend continued therapy 3-5x/wk at discharge to assist pt in regaining his Ind with functional tasks. Per case management pt is going home today despite recommendations to go to a SNF; pt maintains that he has everything in place that he needs  Treatment Diagnosis: impaired mobility  Prognosis: Fair  Decision Making: High Complexity  Clinical Presentation: evolving  PT Education: General Safety  Patient Education: reviewed call light and not getting up without assist  REQUIRES PT FOLLOW UP: Yes  Activity Tolerance  Activity Tolerance: Patient limited by endurance     Patient Diagnosis(es): The primary encounter diagnosis was Generalized abdominal pain. Diagnoses of Volvulus of sigmoid colon ([address] Volvulus ([address] and Left upper quadrant abdominal pain were also pertinent to this visit. has a past medical history of Cerebral artery occlusion with cerebral infarction ([facility] Diabetes mellitus ([facility] and Hypertension. has a past surgical history that includes Colonoscopy (N/A, 11/13/2021); Colonoscopy (N/A, 12/12/2021); and colectomy (N/A, 12/15/2021). Restrictions  Restrictions/Precautions  Restrictions/Precautions: Fall Risk  Position Activity Restriction  Other position/activity restrictions: H/o CVA w/ right side weakness  Subjective   General  Chart Reviewed: Yes  Additional Pertinent Hx: Patient is a 47-year-old male with past medical history of diabetes mellitus who presented to hospital on 12/12/21 for abdominal pain and diarrhea. Pt underwent sigmoid volvulus decompression on 12/12/21. S/p SIGMOID COLECTOMY  With anastomosis on 12/15. Therapy re-ordered by Cory Zavala on 12/16 to resume PT treatment. Response To Previous Treatment: Patient with no complaints from previous session.   Family / Caregiver Present: No  Referring Practitioner: Everett Peña MD  Subjective  Subjective: pt adamant he is going home today; agreed to try to simulate his \"system for transfers\" that he does at home          Orientation  Orientation  Overall Orientation Status: Within Functional Limits  Cognition   Cognition  Overall Cognitive Status: Exceptions  Arousal/Alertness: Appropriate responses to stimuli  Following Commands: Follows one step commands consistently  Attention Span: Appears intact  Memory: Decreased recall of recent events;Decreased recall of biographical Information  Safety Judgement: Decreased awareness of need for safety;Decreased awareness of need for assistance  Problem Solving: Decreased awareness of errors  Insights: Decreased awareness of deficits  Initiation: Requires cues for some  Sequencing: Requires cues for some  Cognition Comment: May be questionable historian  Objective   Bed mobility  Supine to Sit: 2 Person assistance; Moderate assistance (HOB elevated, use of bed rail; pt initiated moving LEs off bed, however still required 2 assist)  Sit to Supine:  (pt in chair at end of session)  Scooting: Maximal assistance (scoot hips towards EOB)  Transfers  Sit to Stand: Maximum Assistance;2 Person Assistance; Moderate Assistance (with stedy)  Stand to sit: Moderate Assistance;Maximum Assistance;2 Person Assistance (from stedy)  Squat Pivot Transfers:  Moderate Assistance;Maximum Assistance;2 Person Assistance (from EOB to recliner chair with arm of recliner dropped down to simulate his w/c)        Balance  Comments: SBA for sitting EOB; mod A of 1-2 for standing on stedy            Comment: after attempting sit pivot transfer to chair, attempted to stand from partially sitting on recliner chair (on lowered armrest and seat of chair) and pulling up on rail of bed with max A however unable to scoot pt over in chair in partial standing therefore used stedy to reposition in chair for lunch              G-Code OutComes Score                                                     AM-PAC Score  AM-PAC Inpatient Mobility Raw Score : 9 (12/20/21 1349)  AM-PAC Inpatient T-Scale Score : 30.55 (12/20/21 1349)  Mobility Inpatient CMS 0-100% Score: 81.38 (12/20/21 1349)  Mobility Inpatient CMS G-Code Modifier : CM (12/20/21 1349)          Goals  Short term goals  Time Frame for Short term goals: by acute discharge--goals ongoing  Short term goal 1: bed mobiltiy with min A  Short term goal 2: stand pivot vs squat pviot with min A  Patient Goals   Patient goals : none stated    Plan    Plan  Times per week: 3-5x/week  Specific instructions for Next Treatment: cotx  Current Treatment Recommendations: Strengthening,Functional Mobility Training,Transfer Training,Balance Training,Endurance Training,Patient/Caregiver Education & Training,Safety Education & Training,Equipment Evaluation, Education, & procurement,Neuromuscular Re-education  Safety Devices  Type of devices: Call light within reach,Chair alarm in place,Left in chair,Nurse notified,Gait belt,All fall risk precautions in place  Restraints  Initially in place: No     Therapy Time   Individual Concurrent Group Co-treatment   Time In 1330         Time Out 1420         Minutes 50                 BETH MCCLAIN PT    Electronically signed by BETH MCCLAIN PT on 12/21/2021 at 2:23 PM

## 2021-12-21 NOTE — PROGRESS NOTES
Prepared patient for discharge. Removed IV. Reviewed discharge instructions and medications. Patient verbalized understanding but needs reinforcement. Transport company has arrived. Patient was loaded onto stretcher and wheeled off unit.

## 2021-12-21 NOTE — PLAN OF CARE
Problem: Falls - Risk of:  Goal: Will remain free from falls  Description: Will remain free from falls  Outcome: Ongoing     Problem: Skin Integrity:  Goal: Will show no infection signs and symptoms  Description: Will show no infection signs and symptoms  Outcome: Ongoing     Problem: Falls - Risk of:  Goal: Absence of physical injury  Description: Absence of physical injury  Outcome: Ongoing     Problem: Skin Integrity:  Goal: Absence of new skin breakdown  Description: Absence of new skin breakdown  Outcome: Ongoing     Problem: Nutrition  Goal: Optimal nutrition therapy  Outcome: Ongoing

## 2021-12-21 NOTE — CARE COORDINATION
Case Management Assessment            Discharge Note                    Date / Time of Note: 12/21/2021 1:36 PM                  Discharge Note Completed by: Saman Rae RN     Met with pt at the bedside to discuss discharge plan. He is alert and oriented x 4. He stated he wants to go home. He has declined SNF despite urging and encouragement from RN, SW, and therapy. He stated he has a system of taking care of himself and compensating for the loss of the use of his right side, \"I'm going to do the same things I've been doing for the last 8 years. \" He gets irritable when his abilities are questioned. He is wheelchair bound at baseline. He has aide services, [facility] [employer] through [employer], and [facility]. He stated there is a ramp at his back door for the stretcher to get him into his home. His son lives 2 blocks away and can help. [facility] transport arranged for [phone]. IMM provided.       Patient Name: Milly Page   YOB: 1945  Diagnosis: Abdominal pain [R10.9]  Generalized abdominal pain [R10.84]  Volvulus of sigmoid colon ([address] [K56.2]   Date / Time: 12/12/2021 12:42 PM    Current PCP: OBI Hwang CNP    Advance Directives:  Code Status: Full Code    Financial:  Payor: Fede Gut / Plan: MEDICARE PART A AND B / Product Type: *No Product type* /      Pharmacy:    [id number] [address]  [address]. [phone] [employer] [phone]  R Toyin Olmos [address] [place] [id number]  Phone: [phone] Fax: [phone] - 1264 [facility], [address] [place] [address] [phone] [employer] [phone]  [address] [place] [address]  Phone: [phone] Fax: [phone]      Assistance purchasing medications?: Potential Assistance Purchasing Medications: No    ADLS:  Current PT AM-PAC Score: 9 /24  Current OT AM-PAC Score: 11 /24    DISCHARGE Disposition: Home with [address] [facility] [address]: CLEMENT dodson [facility] AT [facility] Completed: Yes    IMM Completed:   Yes, Case management has presented and reviewed IMM letter #2 to the patient and/or family/ POA. Patient and/or family/POA verbalized understanding of their medicare rights and appeal process if needed. Patient and/or family/POA has signed, initialed and placed today's date (12/21/21) and time (1400) on IMM letter #2 on the the appropriate lines. Patient and/or family/POA, copy of letter offered and they are aware that this original copy of IMM letter #2 is available prior to discharge from the paper chart on the unit. Electronic documentation has been entered into epic for IMM letter #2 and original paper copy has been added to the paper chart at the nurses station. Transportation:  Transportation PLAN for discharge: EMS transportation   Mode of Transport: Ambulance stretcher - BLS  Reason for medical transport: Other: Hx CVA w right side affected. Non-ambulatory   Name of [address] [place] [address], [address]: [facility]  Phone: [phone]  Time of Transport: [address] [employer]    Transport form completed: Yes    Home Care:  [address] ordered at discharge: [facility]: [address] [facility] [phone] [address] [place] [address] Street:  Equipment obtained during hospitalization: wheelchair    The Patient and/or patient representative Yue Jewell and his family were provided with a choice of provider and agrees with the discharge plan Yes    Freedom of choice list was provided with basic dialogue that supports the patient's individualized plan of care/goals and shares the quality data associated with the providers.  Yes      Anna Ramirez RN  Case Management  [phone]

## 2021-12-21 NOTE — DISCHARGE SUMMARY
Hospital Medicine Discharge Summary    Tianna Russo  :  1945  MRN:  [id number]    Admit date:  2021  Discharge date:  2021    Admitting Physician:  Yennifer Bui MD  Primary Care Physician:  Brenna Morse, APRN - CNP      Discharge Diagnoses: Active Problems:    Sigmoid volvulus (HCC)    Abdominal pain  Resolved Problems:    * No resolved hospital problems. *      Hospital Course:   Tianna Russo is a 64 y.o. male that was admitted and treated at [facility] for the following medical issues:             History Of Present Illness:  Patient is a 68-year-old male with past medical history of diabetes mellitus who presented to hospital for abdominal pain. According to patient he has generalized abdominal pain, has been diarrhea for 3 to 4 days it has been getting worse, he has associated loose bowels but no nausea or vomiting, he has not noticed if he is passing gas. He mentions his abdominal pain is 10/10 intensity, nonradiating, no aggravating or alleviating factors. Otherwise denied fevers chills chest pain shortness of breath  Patient underwent sigmoid colectomy for volvulus did well post surgery followed by general surgery and patient is now stable to be discharged back to nursing home tolerating p.o. intake very well no bowel movement issues    Active Problems:    Sigmoid volvulus (HCC)    Abdominal pain  Resolved Problems:    * No resolved hospital problems.  *      Patient was seen by the following consultants while admitted to [facility].:   Consults:  [facility]    Significant Diagnostic Studies:    CT ABDOMEN PELVIS WO CONTRAST Additional Contrast? None    Result Date: 2021  EXAMINATION: CT OF THE ABDOMEN AND PELVIS WITHOUT CONTRAST 2021 1:05 pm TECHNIQUE: CT of the abdomen and pelvis was performed without the administration of intravenous contrast. Multiplanar reformatted images are provided The urinary bladder is unremarkable. Bones/Soft Tissues: Degenerative changes are present throughout the lumbar spine. No acute fracture. Small fat containing umbilical hernia again noted. Recurrent or ongoing sigmoid volvulus with mild surrounding inflammatory fat stranding at the mildly left site, similar to prior. No pneumatosis, free fluid, free air, or mesenteric or portal venous gas. Sigmoid diverticulosis, without evidence of acute diverticulitis. Mild prostate gland enlargement. RECOMMENDATIONS: Unavailable     Colonoscopy    Result Date: 12/12/2021  No dictation       Discharge Medications:         Medication List      START taking these medications    HYDROcodone-acetaminophen 5-325 MG per tablet  Commonly known as: NORCO  Take 1 tablet by mouth every 6 hours as needed for Pain for up to 5 days.         CONTINUE taking these medications    allopurinol 100 MG tablet  Commonly known as: ZYLOPRIM     apixaban 5 MG Tabs tablet  Commonly known as: ELIQUIS     atorvastatin 20 MG tablet  Commonly known as: LIPITOR     bisacodyl 5 MG EC tablet  Commonly known as: DULCOLAX     Cholecalciferol 50 MCG (2000 UT) Tabs     cloNIDine 0.2 MG/24HR Ptwk  Commonly known as: CATAPRES  Place 1 patch onto the skin once a week  Start taking on: December 27, 2021     dilTIAZem 180 MG extended release capsule  Commonly known as: DILACOR XR     docusate sodium 100 MG capsule  Commonly known as: COLACE     ferrous sulfate 325 (65 Fe) MG EC tablet  Commonly known as: FE TABS 325     omeprazole 20 MG delayed release capsule  Commonly known as: PRILOSEC     polyethylene glycol 17 g packet  Commonly known as: GLYCOLAX  Take 17 g by mouth daily     potassium chloride 20 MEQ extended release tablet  Commonly known as: KLOR-CON M           Where to Get Your Medications      These medications were sent to [facility]/pharmacy #[id number]- Critical access hospital, [place] - [address] [facility] - P [phone] - F [phone]  [address] [facility] [place] [address]    Phone: [phone]   · polyethylene glycol 17 g packet     Information about where to get these medications is not yet available    Ask your nurse or doctor about these medications  · cloNIDine 0.2 MG/24HR Ptwk  · HYDROcodone-acetaminophen 5-325 MG per tablet         Disposition:   Discharged to Home. Any [facility] needs that were indicated and/or required as been addressed and set up by Social Work. Condition at discharge: Pt was medically stable at the time of discharge. Activity: activity as tolerated    Total time taken for discharging this patient: 40 minutes. Greater than 70% of time was spent focused exclusively on this patient. Time was taken to review chart, discuss plans with consultants, reconciling medications, discussing plan answering questions with patient. Signed:  Amber Ceballos MD  12/21/2021, 12:27 PM  ----------------------------------------------------------------------------------------------------------------------    Lisseth Coffman,     Please return to ER or call 911 if you develop any significant signs or symptoms.     I may not have addressed all of your medical illnesses or the abnormal blood work or imaging therefore please ask your PCP, OBI Sutherland CNP ,  to obtain [facility] record to follow up on all of the abnormal labs, imaging and findings that I have and have not addressed during your hospitalization.      Discharging you from the hospital does not mean that your medical care ends here and now. You may still need additional work up, investigation, monitoring, and treatment to be handled from this point on by outside providers including your PCP, OBI Sutherland CNP , Specialists and other healthcare providers.      Please review your list of discharge medications prior to resuming medications you might still have at home, as the medications you need to be taking, dosages or how often you must take them may have changed.  For medication

## 2021-12-21 NOTE — PROGRESS NOTES
Occupational Therapy  Facility/Department: [facility] 5 PROGRESSIVE CARE  Daily Treatment Note  NAME: Kaila Tam  : 1945  MRN: [id number]    Date of Service: 2021    Discharge Recommendations:  Patient would benefit from continued therapy after discharge,3-5 sessions per week (however pt declining)  OT Equipment Recommendations  Equipment Needed: No    Assessment   Performance deficits / Impairments: Decreased functional mobility ; Decreased ADL status; Decreased ROM; Decreased strength;Decreased safe awareness;Decreased balance;Decreased cognition;Decreased endurance  Assessment: Pt is a 65 yo M admitted with abdominal pain and s/p sigmoid colectomy with anastomosis 12/15/2021. Pt has h/o CVA with right sided weakness (~8 yrs prior). PTA, pt provides inconsistent PLOF- at times reports he transfers independently to power w/c and toilet and others time states HHA assist with transfers. Unclear how much assist aides are providing. TODAY- pt initiated moving manjit LEs off bed for bed mobility but ultimately required mod A x 2 to fully sit up and swing hips towards HOB. Attempted to simulated how pt would complete bed > w/c TF at home. Arm rest removed on recliner and placed next to bed at 90*. Pt required mod/max A  x 2 to complete squat pivot(to pt left side). Pt had significant difficulty shifting weight forward to swing hips into chair. Pt required max A  x 2 to stand to odails stedy from chair to reposition linens in chair. Pt with significant posterior lean and has difficulty bringing hips forward. Pt with limited standing tolerance/balance necessary for ADLs. Anticipate pt will require up to max A for ADLs at this time. Pt is functioning below baseline and benefit from skilled therapy. Pt with decreased insight to deficits and assistance provided and states, \"I did it before I came into hospital and can do it again. \" Pt reports he plans on returning home with home therapy.   Prognosis: Good  OT Education: OT Role;Plan of Care;ADL Adaptive Strategies;Transfer Training;Energy Conservation  Barriers to Learning: decrease insight to deficits/safety and assistance required  REQUIRES OT FOLLOW UP: Yes  Activity Tolerance  Activity Tolerance: Patient Tolerated treatment well  Activity Tolerance: motivated to return home, however significant decreased inisight to deficits/assistance required for transfers  Safety Devices  Safety Devices in place: Yes  Type of devices: Call light within reach;Nurse notified; Patient at risk for falls; Left in chair;Chair alarm in place;Gait belt         Patient Diagnosis(es): The primary encounter diagnosis was Generalized abdominal pain. Diagnoses of Volvulus of sigmoid colon ([facility] [address] Volvulus ([facility] [address] and Left upper quadrant abdominal pain were also pertinent to this visit. has a past medical history of Cerebral artery occlusion with cerebral infarction ([facility] [address] Diabetes mellitus ([facility] [address] and Hypertension. has a past surgical history that includes Colonoscopy (N/A, 11/13/2021); Colonoscopy (N/A, 12/12/2021); and colectomy (N/A, 12/15/2021). Restrictions  Restrictions/Precautions  Restrictions/Precautions: Fall Risk  Position Activity Restriction  Other position/activity restrictions: H/o CVA w/ right side weakness     Subjective   General  Chart Reviewed: Yes  Patient assessed for rehabilitation services?: Yes  Additional Pertinent Hx: 76m admitted 12/12/2021 with recurrence of abdominal pain secondary to sigmoid volvulus, underwent endoscopic reduction, however due to recurrence Surgery was consulted. Underwent sigmoid colectomy with anastamosis 12/15  Family / Caregiver Present: No  Referring Practitioner: Terry  Diagnosis: Abdominal pain  Subjective  Subjective: Pt seen bedside and agreeable to therapy. Pt denied pain and hopeful to return home today.   General Comment  Comments: Pt with decrease insight to deficits and frequently states, \"I have my methods that work at [employer] and [employer] could do this at home before. \"         Objective    ADL  Feeding:  (pt able to feed self after set up in chair and containers opened)  Additional Comments: Anticipate pt will be max/dep A for all ADLs based on balance, ROM and endurance observed        Balance  Sitting Balance: Contact guard assistance (unsupported EOB in prep for transfer)  Standing Balance: Maximum assistance  Standing Balance  Time: posterior lean when standing briefly for transfer  Functional Mobility  Functional Mobility Comments: non amb- recommend maxi move to transfer back to bed     Bed mobility  Supine to Sit: 2 Person assistance; Moderate assistance (HOB elevated, use of bed rail; pt initiated moving LEs off bed, however still required 2 assist)  Sit to Supine:  (pt in chair at end of session)  Scooting: Maximal assistance (scoot hips towards EOB)     Transfers  Sit Pivot Transfers: Maximum assistance;2 Person assistance; Moderate assistance  Sit to stand: Maximum assistance;2 Person assistance (stood from chair > stedy with max A x 2 to reposition chair linens.)  Stand to sit: Maximum assistance;2 Person assistance  Transfer Comments: simulated how pt would complete bed > w/c TF at home. Arm rest removed on recliner and placed next to bed at 90*. Pt required mod/max A  x 2 to complete squat pivot(to pt left side). Difficulty shifting weight forward to swing hips into chair. Cognition  Overall Cognitive Status: Exceptions  Arousal/Alertness: Appropriate responses to stimuli  Following Commands:  Follows one step commands consistently  Attention Span: Appears intact  Memory: Decreased recall of recent events;Decreased recall of biographical Information  Safety Judgement: Decreased awareness of need for safety;Decreased awareness of need for assistance  Problem Solving: Decreased awareness of errors  Insights: Decreased awareness of deficits  Initiation: Requires cues for some  Sequencing: Requires cues for some  Cognition Comment: May be questionable historian              Plan   Plan  Times per week: 3-5  Times per day: Daily  Current Treatment Recommendations: Strengthening,Functional Mobility Training,Endurance Training,ROM,Balance Training,Safety Education & Training,Self-Care / ADL,Equipment Evaluation, Education, & procurement,Patient/Caregiver Education & Training    AM-PAC Score  AM-PAC Inpatient Daily Activity Raw Score: 11 (12/21/21 1418)  AM-PAC Inpatient ADL T-Scale Score : 29.04 (12/21/21 1418)  ADL Inpatient CMS 0-100% Score: 70.42 (12/21/21 1418)  ADL Inpatient CMS G-Code Modifier : CL (12/21/21 1418)    Goals  Short term goals  Time Frame for Short term goals: Prior to d/c: goals ongoing  Short term goal 1: Pt will complete bed mobility w/ SBA  Short term goal 2: Pt will complete fxl transfers w/ CGA via stand/squat pivot  Short term goal 3: Pt will groom w/ setup  Short term goal 4: Pt will toilet w/ CGA  Long term goals  Time Frame for Long term goals : LTG=STG  Patient Goals   Patient goals : to go home       Therapy Time   Individual Concurrent Group Co-treatment   Time In 1330         Time Out 1419         Minutes 49         Timed Code Treatment Minutes: 52 Minutes     This note to serve as OT d/c summary if pt is d/c-ed prior to next therapy session.     Courtney Kumari OTR/L

## 2021-12-21 NOTE — PROGRESS NOTES
Progress Note  Date:2021       Room:G6S-0129/5107-01  Patient Name:Kam Seymuor     YOB: 1945     Age:76 y.o. Subjective    Subjective:  Symptoms:  Stable. Diet:  Adequate intake. Activity level: Returning to normal.       Review of Systems  Objective         Vitals Last 24 Hours:  TEMPERATURE:  Temp  Av.7 °F (36.5 °C)  Min: 97.2 °F (36.2 °C)  Max: 98.1 °F (36.7 °C)  RESPIRATIONS RANGE: Resp  Av.7  Min: 16  Max: 18  PULSE OXIMETRY RANGE: SpO2  Av.8 %  Min: 97 %  Max: 99 %  PULSE RANGE: Pulse  Av.2  Min: 51  Max: 60  BLOOD PRESSURE RANGE: Systolic (01EYE), TI , Min:123 , WQA:498   ; Diastolic (01IRB), WCW:01, Min:60, Max:111    I/O (24Hr): Intake/Output Summary (Last 24 hours) at 2021 1040  Last data filed at 2021 0819  Gross per 24 hour   Intake 3458.41 ml   Output 325 ml   Net 3133.41 ml     Objective  Labs/Imaging/Diagnostics    Labs:  CBC:  Recent Labs     21  0444   WBC 6.7 6.3 8.0   RBC 2.72* 2.77* 2.85*   HGB 7.9* 8.1* 8.5*   HCT 24.6* 25.1* 25.8*   MCV 90.4 90.8 90.6   RDW 15.7* 15.6* 15.1    164 186     CHEMISTRIES:  Recent Labs     21  04321  04321  0432 21  0444     --  138 136   K 3.5  3.5   < > 3.3*  3.3* 3.6  3.6   *  --  110 110   CO2 16*  --  15* 15*   BUN 18  --  19 20   CREATININE 1.6*  --  1.6* 1.6*   GLUCOSE 101*  --  90 95   PHOS 2.9  --  2.7 2.5   MG 1.50*  --  1.70*  --     < > = values in this interval not displayed. PT/INR:No results for input(s): PROTIME, INR in the last 72 hours. APTT:  Recent Labs     21  1559 21  2228 21  0439   APTT 78.6* 77.9* 80.7*     LIVER PROFILE:No results for input(s): AST, ALT, BILIDIR, BILITOT, ALKPHOS in the last 72 hours. Imaging Last 24 Hours:  No results found.   Assessment//Plan           Hospital Problems           Last Modified POA    Sigmoid volvulus ([facility] [address] 12/15/2021 Yes Abdominal pain 12/12/2021 Yes        Assessment & Plan    Stable post op from sigmoid colectomy  Reports good PO intake and bowel function  OK for discharge from my standpoint    Electronically signed by Reggie Beltrán MD on 12/21/2021 at 10:40 AM    Electronically signed by Reggie Beltrán MD on 12/21/21 at 10:40 AM EST

## 2021-12-21 NOTE — DISCHARGE INSTR - COC
Continuity of Care Form    Patient Name: Anthony Garcia   :  1945  MRN:  [id number]    Admit date:  2021  Discharge date:  21    Code Status Order: Full Code   Advance Directives:   [facility] Documentation       Date/Time Healthcare Directive Type of Healthcare Directive Copy in [facility] [address] Agent's Name Healthcare Agent's Phone Number    12/15/21 0809 No, patient does not have an advance directive for healthcare treatment -- -- -- -- --            Admitting Physician:  Judit Bautista MD  PCP: OBI Pepe CNP    Discharging Nurse: Robin Vieira Unit/Room#: W5U-4044/1145-06  Discharging Unit Phone Number: [phone]    Emergency Contact:   Extended Emergency Contact Information  Primary Emergency Contact: Millicent Gardiner  Address: [address] Phone: [phone]  Work Phone: [phone]  Mobile Phone: [phone]  Relation: Child  Secondary Emergency Contact: Vitaliy Bloomglennaelena, [address] Phone: [phone]  Relation: Brother/Sister   needed?  No    Past Surgical History:  Past Surgical History:   Procedure Laterality Date    COLECTOMY N/A 12/15/2021    SIGMOID COLECTOMY performed by Angeline Rosales MD at [facility] N/A 2021    COLONOSCOPY DIAGNOSTIC performed by Miriam Ramsey MD at [address] [place] [address] N/A 2021    COLONOSCOPY FLEXIBLE W/ DECOMPRESSION performed by Bushra Burnett MD at [address] [place] [address]       Immunization History:   Immunization History   Administered Date(s) Administered    COVID-19, J&J, PF, 0.5 mL 2021    COVID-19, [employer]ney PF, 30mcg/0.3mL 2021       Active Problems:  Patient Active Problem List   Diagnosis Code    Sigmoid volvulus ([facility] [address] K56.2    Abdominal pain R10.9       Isolation/Infection:   Isolation            No Isolation          Patient Infection Status Infection Onset Added Last Indicated Last Indicated By Review Planned Expiration Resolved Resolved By    None active    Resolved    COVID-19 (Rule Out) 11/13/21 11/13/21 11/13/21 COVID-19, Rapid (Ordered)   11/13/21 Rule-Out Test Resulted            Nurse Assessment:  Last Vital Signs: /65   Pulse 55   Temp 97.6 °F (36.4 °C) (Oral)   Resp 16   Ht 5' 9\" (1.753 m)   Wt 237 lb 14 oz (107.9 kg)   SpO2 96%   BMI 35.13 kg/m²     Last documented pain score (0-10 scale): Pain Level: 2  Last Weight:   Wt Readings from Last 1 Encounters:   12/21/21 237 lb 14 oz (107.9 kg)     Mental Status:  oriented and alert    IV Access:  - None    Nursing Mobility/ADLs:  Walking   Assisted  Transfer  Assisted  Bathing  Assisted  Dressing  Assisted  Toileting  Assisted  Feeding  Independent  Med Admin  Assisted  Med Delivery   whole    Wound Care Documentation and Therapy:        Elimination:  Continence: Bowel: Yes  Bladder: Yes  Urinary Catheter: None   Colostomy/Ileostomy/Ileal Conduit: No       Date of Last BM:     Intake/Output Summary (Last 24 hours) at 12/21/2021 1502  Last data filed at 12/21/2021 1240  Gross per 24 hour   Intake 3458.41 ml   Output 575 ml   Net 2883.41 ml     I/O last 3 completed shifts: In: 3458.4 [P.O.:400; I.V.:3027; IV Piggyback:31.5]  Out: 575 [Urine:575]    Safety Concerns: At Risk for Falls    Impairments/Disabilities:      None    Nutrition Therapy:  Current Nutrition Therapy:   - Oral Diet:  General    Routes of Feeding: Oral  Liquids: Thin Liquids  Daily Fluid Restriction: no  Last Modified Barium Swallow with Video (Video Swallowing Test): not done    Treatments at the Time of Hospital Discharge:   Respiratory Treatments: none  Oxygen Therapy:  is not on home oxygen therapy.   Ventilator:    - No ventilator support    Rehab Therapies: Physical Therapy and Occupational Therapy  Weight Bearing Status/Restrictions: No weight bearing restirctions  Other Medical Equipment (for information only, NOT a DME order):  walker  Other Treatments:     Patient's personal belongings (please select all that are sent with patient):  Glasses, clothing    RN SIGNATURE:  Electronically signed by Nas Velazquez on 12/21/21 at 4:10 PM EST    CASE MANAGEMENT/SOCIAL WORK SECTION    Inpatient Status Date: ***    Readmission Risk Assessment Score:  Readmission Risk              Risk of Unplanned Readmission:  21           Discharging to [facility]  Address: [place] [address]  Phone: [phone]      / signature: Electronically signed by Adriana Hitchcock RN on 12/21/21 at 3:03 PM EST    PHYSICIAN SECTION    Prognosis: {Prognosis:4564073166}    Condition at Discharge: [facility] [place] Patient Condition:981621532}    Rehab Potential (if transferring to Rehab): {Prognosis:5982231772}    Recommended Labs or Other Treatments After Discharge: ***    Physician Certification: I certify the above information and transfer of Don Andujar  is necessary for the continuing treatment of the diagnosis listed and that he requires {Admit to Appropriate Level of Care:90296} for {GREATER/LESS:349084184} 30 days.      Update Admission H&P: {CHP DME Changes in HBP:001516893}    PHYSICIAN SIGNATURE:  {Esignature:[id number]}

## 2022-01-06 ENCOUNTER — OFFICE VISIT (OUTPATIENT)
Dept: SURGERY | Age: 77
End: 2022-01-06

## 2022-01-06 DIAGNOSIS — K56.2 SIGMOID VOLVULUS (HCC): Primary | ICD-10-CM

## 2022-01-06 PROCEDURE — 99024 POSTOP FOLLOW-UP VISIT: CPT | Performed by: SURGERY

## 2022-01-06 NOTE — PROGRESS NOTES
Roderick Roman (:  1945) is a 64 y.o. male,Established patient, here for evaluation of the following chief complaint(s):  Post-Op Check (Pt is here today for a postop visit. )         ASSESSMENT/PLAN:  1. Sigmoid volvulus ([address]      Follow up with me as needed           Subjective   SUBJECTIVE/OBJECTIVE:  HPI  Patient presents s/p sigmoid colectomy for volvulus. Patient is three weeks post op. Pain level is insignficant. Incision appearance: well healed. Post op complications: none. Pathology report reviewed with patient and showed no malignancy. Follow up prn. Review of Systems       Objective   Physical Exam       Electronically signed by Javed Cotton MD on 2022 at 10:54 AM        An electronic signature was used to authenticate this note.     --Javed Cotton MD
pt clearly lacks capacity to care for self, unable to reach son via number in chart for further information.  Will require admission for social concerns and possible placement

## 2022-09-24 ENCOUNTER — HOSPITAL ENCOUNTER (INPATIENT)
Age: 77
LOS: 4 days | Discharge: NURSING, REHAB OR LONG-TERM CARE | DRG: 057 | End: 2022-09-28
Attending: EMERGENCY MEDICINE | Admitting: INTERNAL MEDICINE
Payer: MEDICARE

## 2022-09-24 ENCOUNTER — APPOINTMENT (OUTPATIENT)
Dept: GENERAL RADIOLOGY | Age: 77
DRG: 057 | End: 2022-09-24
Payer: MEDICARE

## 2022-09-24 ENCOUNTER — APPOINTMENT (OUTPATIENT)
Dept: CT IMAGING | Age: 77
DRG: 057 | End: 2022-09-24
Payer: MEDICARE

## 2022-09-24 DIAGNOSIS — R41.82 ALTERED MENTAL STATUS, UNSPECIFIED ALTERED MENTAL STATUS TYPE: ICD-10-CM

## 2022-09-24 DIAGNOSIS — N17.9 AKI (ACUTE KIDNEY INJURY) (HCC): Primary | ICD-10-CM
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
## 2022-09-24 LAB
A/G RATIO: 1.2 (ref 1.1–2.2)
ACETAMINOPHEN LEVEL: <5 UG/ML (ref 10–30)
ALBUMIN SERPL-MCNC: 4.3 G/DL (ref 3.4–5)
ALP BLD-CCNC: 173 U/L (ref 40–129)
ALT SERPL-CCNC: 9 U/L (ref 10–40)
AMPHETAMINE SCREEN, URINE: NORMAL
ANION GAP SERPL CALCULATED.3IONS-SCNC: 14 MMOL/L (ref 3–16)
AST SERPL-CCNC: 10 U/L (ref 15–37)
BACTERIA: ABNORMAL /HPF
BARBITURATE SCREEN URINE: NORMAL
BASOPHILS ABSOLUTE: 0.1 K/UL (ref 0–0.2)
BASOPHILS RELATIVE PERCENT: 0.7 %
BENZODIAZEPINE SCREEN, URINE: NORMAL
BILIRUB SERPL-MCNC: 0.3 MG/DL (ref 0–1)
BILIRUBIN URINE: NEGATIVE
BLOOD, URINE: ABNORMAL
BUN BLDV-MCNC: 41 MG/DL (ref 7–20)
CALCIUM SERPL-MCNC: 9.7 MG/DL (ref 8.3–10.6)
CANNABINOID SCREEN URINE: NORMAL
CHLORIDE BLD-SCNC: 110 MMOL/L (ref 99–110)
CLARITY: ABNORMAL
CO2: 13 MMOL/L (ref 21–32)
COCAINE METABOLITE SCREEN URINE: NORMAL
COLOR: YELLOW
CREAT SERPL-MCNC: 2.9 MG/DL (ref 0.8–1.3)
EOSINOPHILS ABSOLUTE: 0.1 K/UL (ref 0–0.6)
EOSINOPHILS RELATIVE PERCENT: 1.1 %
EPITHELIAL CELLS, UA: 3 /HPF (ref 0–5)
ETHANOL: NORMAL MG/DL (ref 0–0.08)
FENTANYL SCREEN, URINE: NORMAL
GFR AFRICAN AMERICAN: 26
GFR NON-AFRICAN AMERICAN: 21
GLUCOSE BLD-MCNC: 140 MG/DL (ref 70–99)
GLUCOSE URINE: NEGATIVE MG/DL
HCT VFR BLD CALC: 32.3 % (ref 40.5–52.5)
HEMOGLOBIN: 10.5 G/DL (ref 13.5–17.5)
HYALINE CASTS: 4 /LPF (ref 0–8)
KETONES, URINE: NEGATIVE MG/DL
LACTIC ACID: 1.1 MMOL/L (ref 0.4–2)
LEUKOCYTE ESTERASE, URINE: ABNORMAL
LYMPHOCYTES ABSOLUTE: 0.8 K/UL (ref 1–5.1)
LYMPHOCYTES RELATIVE PERCENT: 8.9 %
Lab: NORMAL
MCH RBC QN AUTO: 29.6 PG (ref 26–34)
MCHC RBC AUTO-ENTMCNC: 32.4 G/DL (ref 31–36)
MCV RBC AUTO: 91.5 FL (ref 80–100)
METHADONE SCREEN, URINE: NORMAL
MICROSCOPIC EXAMINATION: YES
MONOCYTES ABSOLUTE: 0.5 K/UL (ref 0–1.3)
MONOCYTES RELATIVE PERCENT: 5.7 %
MUCUS: PRESENT
NEUTROPHILS ABSOLUTE: 7.4 K/UL (ref 1.7–7.7)
NEUTROPHILS RELATIVE PERCENT: 83.6 %
NITRITE, URINE: NEGATIVE
OPIATE SCREEN URINE: NORMAL
OXYCODONE URINE: NORMAL
PDW BLD-RTO: 16.4 % (ref 12.4–15.4)
PH UA: 5
PH UA: 5 (ref 5–8)
PHENCYCLIDINE SCREEN URINE: NORMAL
PLATELET # BLD: 200 K/UL (ref 135–450)
PMV BLD AUTO: 9.8 FL (ref 5–10.5)
POTASSIUM REFLEX MAGNESIUM: 4.3 MMOL/L (ref 3.5–5.1)
PROTEIN UA: 30 MG/DL
RBC # BLD: 3.53 M/UL (ref 4.2–5.9)
RBC UA: 1 /HPF (ref 0–4)
SALICYLATE, SERUM: <0.3 MG/DL (ref 15–30)
SODIUM BLD-SCNC: 137 MMOL/L (ref 136–145)
SPECIFIC GRAVITY UA: 1.01 (ref 1–1.03)
TOTAL PROTEIN: 8 G/DL (ref 6.4–8.2)
TROPONIN: 0.01 NG/ML
URINE REFLEX TO CULTURE: YES
URINE TYPE: ABNORMAL
UROBILINOGEN, URINE: 0.2 E.U./DL
WBC # BLD: 8.9 K/UL (ref 4–11)
WBC UA: 21 /HPF (ref 0–5)

## 2022-09-24 PROCEDURE — 2580000003 HC RX 258: Performed by: EMERGENCY MEDICINE

## 2022-09-24 PROCEDURE — 2060000000 HC ICU INTERMEDIATE R&B

## 2022-09-24 PROCEDURE — 6370000000 HC RX 637 (ALT 250 FOR IP): Performed by: NURSE PRACTITIONER

## 2022-09-24 PROCEDURE — 6370000000 HC RX 637 (ALT 250 FOR IP): Performed by: EMERGENCY MEDICINE

## 2022-09-24 PROCEDURE — 82077 ASSAY SPEC XCP UR&BREATH IA: CPT

## 2022-09-24 PROCEDURE — 51798 US URINE CAPACITY MEASURE: CPT

## 2022-09-24 PROCEDURE — 80053 COMPREHEN METABOLIC PANEL: CPT

## 2022-09-24 PROCEDURE — 6370000000 HC RX 637 (ALT 250 FOR IP): Performed by: INTERNAL MEDICINE

## 2022-09-24 PROCEDURE — 80179 DRUG ASSAY SALICYLATE: CPT

## 2022-09-24 PROCEDURE — 84484 ASSAY OF TROPONIN QUANT: CPT

## 2022-09-24 PROCEDURE — 81001 URINALYSIS AUTO W/SCOPE: CPT

## 2022-09-24 PROCEDURE — 80143 DRUG ASSAY ACETAMINOPHEN: CPT

## 2022-09-24 PROCEDURE — 71045 X-RAY EXAM CHEST 1 VIEW: CPT

## 2022-09-24 PROCEDURE — 2580000003 HC RX 258: Performed by: INTERNAL MEDICINE

## 2022-09-24 PROCEDURE — 80307 DRUG TEST PRSMV CHEM ANLYZR: CPT

## 2022-09-24 PROCEDURE — 83605 ASSAY OF LACTIC ACID: CPT

## 2022-09-24 PROCEDURE — 87086 URINE CULTURE/COLONY COUNT: CPT

## 2022-09-24 PROCEDURE — 70450 CT HEAD/BRAIN W/O DYE: CPT

## 2022-09-24 PROCEDURE — 85025 COMPLETE CBC W/AUTO DIFF WBC: CPT

## 2022-09-24 PROCEDURE — 96360 HYDRATION IV INFUSION INIT: CPT

## 2022-09-24 PROCEDURE — 99285 EMERGENCY DEPT VISIT HI MDM: CPT

## 2022-09-24 RX ORDER — SERTRALINE HYDROCHLORIDE 25 MG/1
25 TABLET, FILM COATED ORAL EVERY EVENING
COMMUNITY

## 2022-09-24 RX ORDER — ACETAMINOPHEN 500 MG
1000 TABLET ORAL ONCE
Status: COMPLETED | OUTPATIENT
Start: 2022-09-24 | End: 2022-09-24

## 2022-09-24 RX ORDER — DILTIAZEM HYDROCHLORIDE 120 MG/1
120 CAPSULE, COATED, EXTENDED RELEASE ORAL DAILY
Status: DISCONTINUED | OUTPATIENT
Start: 2022-09-25 | End: 2022-09-29 | Stop reason: HOSPADM

## 2022-09-24 RX ORDER — ACETAMINOPHEN 650 MG/1
650 SUPPOSITORY RECTAL EVERY 6 HOURS PRN
Status: DISCONTINUED | OUTPATIENT
Start: 2022-09-24 | End: 2022-09-29 | Stop reason: HOSPADM

## 2022-09-24 RX ORDER — SODIUM CHLORIDE, SODIUM LACTATE, POTASSIUM CHLORIDE, AND CALCIUM CHLORIDE .6; .31; .03; .02 G/100ML; G/100ML; G/100ML; G/100ML
1000 INJECTION, SOLUTION INTRAVENOUS ONCE
Status: COMPLETED | OUTPATIENT
Start: 2022-09-24 | End: 2022-09-24

## 2022-09-24 RX ORDER — ALLOPURINOL 100 MG/1
100 TABLET ORAL DAILY
Status: DISCONTINUED | OUTPATIENT
Start: 2022-09-25 | End: 2022-09-29 | Stop reason: HOSPADM

## 2022-09-24 RX ORDER — ONDANSETRON 4 MG/1
4 TABLET, ORALLY DISINTEGRATING ORAL EVERY 8 HOURS PRN
Status: DISCONTINUED | OUTPATIENT
Start: 2022-09-24 | End: 2022-09-29 | Stop reason: HOSPADM

## 2022-09-24 RX ORDER — ACETAMINOPHEN 325 MG/1
650 TABLET ORAL EVERY 6 HOURS PRN
Status: DISCONTINUED | OUTPATIENT
Start: 2022-09-24 | End: 2022-09-29 | Stop reason: HOSPADM

## 2022-09-24 RX ORDER — SODIUM CHLORIDE 9 MG/ML
INJECTION, SOLUTION INTRAVENOUS PRN
Status: DISCONTINUED | OUTPATIENT
Start: 2022-09-24 | End: 2022-09-29 | Stop reason: HOSPADM

## 2022-09-24 RX ORDER — CLONIDINE 0.2 MG/24H
1 PATCH, EXTENDED RELEASE TRANSDERMAL WEEKLY
Status: DISCONTINUED | OUTPATIENT
Start: 2022-09-24 | End: 2022-09-24 | Stop reason: ALTCHOICE

## 2022-09-24 RX ORDER — SODIUM CHLORIDE 0.9 % (FLUSH) 0.9 %
5-40 SYRINGE (ML) INJECTION PRN
Status: DISCONTINUED | OUTPATIENT
Start: 2022-09-24 | End: 2022-09-29 | Stop reason: HOSPADM

## 2022-09-24 RX ORDER — ATORVASTATIN CALCIUM 20 MG/1
20 TABLET, FILM COATED ORAL NIGHTLY
Status: DISCONTINUED | OUTPATIENT
Start: 2022-09-24 | End: 2022-09-29 | Stop reason: HOSPADM

## 2022-09-24 RX ORDER — SERTRALINE HYDROCHLORIDE 25 MG/1
25 TABLET, FILM COATED ORAL NIGHTLY
Status: DISCONTINUED | OUTPATIENT
Start: 2022-09-24 | End: 2022-09-29 | Stop reason: HOSPADM

## 2022-09-24 RX ORDER — DILTIAZEM HYDROCHLORIDE 180 MG/1
180 CAPSULE, COATED, EXTENDED RELEASE ORAL DAILY
Status: DISCONTINUED | OUTPATIENT
Start: 2022-09-25 | End: 2022-09-24

## 2022-09-24 RX ORDER — SODIUM CHLORIDE 0.9 % (FLUSH) 0.9 %
5-40 SYRINGE (ML) INJECTION EVERY 12 HOURS SCHEDULED
Status: DISCONTINUED | OUTPATIENT
Start: 2022-09-24 | End: 2022-09-29 | Stop reason: HOSPADM

## 2022-09-24 RX ORDER — ONDANSETRON 2 MG/ML
4 INJECTION INTRAMUSCULAR; INTRAVENOUS EVERY 6 HOURS PRN
Status: DISCONTINUED | OUTPATIENT
Start: 2022-09-24 | End: 2022-09-29 | Stop reason: HOSPADM

## 2022-09-24 RX ORDER — SODIUM CHLORIDE 9 MG/ML
INJECTION, SOLUTION INTRAVENOUS CONTINUOUS
Status: DISCONTINUED | OUTPATIENT
Start: 2022-09-24 | End: 2022-09-25

## 2022-09-24 RX ADMIN — SODIUM CHLORIDE: 9 INJECTION, SOLUTION INTRAVENOUS at 22:55

## 2022-09-24 RX ADMIN — ACETAMINOPHEN 1000 MG: 500 TABLET ORAL at 12:32

## 2022-09-24 RX ADMIN — ATORVASTATIN CALCIUM 20 MG: 20 TABLET, FILM COATED ORAL at 22:21

## 2022-09-24 RX ADMIN — APIXABAN 5 MG: 5 TABLET, FILM COATED ORAL at 22:21

## 2022-09-24 RX ADMIN — SODIUM CHLORIDE, POTASSIUM CHLORIDE, SODIUM LACTATE AND CALCIUM CHLORIDE 1000 ML: 600; 310; 30; 20 INJECTION, SOLUTION INTRAVENOUS at 14:01

## 2022-09-24 RX ADMIN — SERTRALINE HYDROCHLORIDE 25 MG: 25 TABLET ORAL at 22:54

## 2022-09-24 ASSESSMENT — PAIN DESCRIPTION - DESCRIPTORS: DESCRIPTORS: PATIENT UNABLE TO DESCRIBE

## 2022-09-24 ASSESSMENT — PAIN SCALES - GENERAL
PAINLEVEL_OUTOF10: 0

## 2022-09-24 ASSESSMENT — PAIN - FUNCTIONAL ASSESSMENT: PAIN_FUNCTIONAL_ASSESSMENT: 0-10

## 2022-09-24 NOTE — ED NOTES
Spoke with adilene perez, who told me according to the nurse aid, pt fell this morning during transfer. Aid was unable to hold patient up. Pt denies hitting head or LOC. Complains of no pain. States he fell onto his right hip/leg.       Yennifer Peterson RN  09/24/22 5629

## 2022-09-24 NOTE — ED PROVIDER NOTES
Emergency Physician Note        Note Open Time: 11:11 AM EDT    Chief Complaint  Aphasia (Slurred speech starting 2 days ago. LNW 2 days ago. Hx stroke, right arm/leg deficit. No dizziness or pain. Takes thinners)       History of Present Illness  Prachi Florez is a 64 y.o. male who presents to the ED for slurred speech. EMS reports the patient is coming in because of slurred speech for the last 2 days. Apparently he has an old stroke that caused a right-sided deficit. Patient has had a generalized decline. His family member arrived shortly after he did and informed us that he had a fall today which is the reason for presentation. 10 systems reviewed, pertinent positives per HPI otherwise noted to be negative    I have reviewed the following from the nursing documentation:      Prior to Admission medications    Medication Sig Start Date End Date Taking?  Authorizing Provider   cloNIDine (CATAPRES) 0.2 MG/24HR PTWK Place 1 patch onto the skin once a week 12/27/21   Renetta Jalloh MD   docusate sodium (COLACE) 100 MG capsule Take 100 mg by mouth daily    Historical Provider, MD   dilTIAZem (DILACOR XR) 180 MG extended release capsule Take 180 mg by mouth daily    Historical Provider, MD   potassium chloride (KLOR-CON M) 20 MEQ extended release tablet Take 20 mEq by mouth 2 times daily    Historical Provider, MD   ferrous sulfate (FE TABS 325) 325 (65 Fe) MG EC tablet Take 325 mg by mouth 2 times daily    Historical Provider, MD   bisacodyl (DULCOLAX) 5 MG EC tablet Take 10 mg by mouth daily as needed for Constipation    Historical Provider, MD   allopurinol (ZYLOPRIM) 100 MG tablet Take 100 mg by mouth daily    Historical Provider, MD   apixaban (ELIQUIS) 5 MG TABS tablet Take 5 mg by mouth 2 times daily    Historical Provider, MD   atorvastatin (LIPITOR) 20 MG tablet Take 20 mg by mouth daily     Historical Provider, MD   Cholecalciferol 50 MCG (2000 UT) TABS Take 50 mcg by mouth daily    Historical Provider, MD   omeprazole (PRILOSEC) 20 MG delayed release capsule Take 20 mg by mouth daily    Historical Provider, MD       Allergies as of 09/24/2022    (No Known Allergies)       Past Medical History:   Diagnosis Date    Cerebral artery occlusion with cerebral infarction ([facility] [address]     Diabetes mellitus ([facility] [address]     Hypertension         Surgical History:   Past Surgical History:   Procedure Laterality Date    COLECTOMY N/A 12/15/2021    SIGMOID COLECTOMY performed by Rox Kelly MD at [address] [place] [address] N/A 11/13/2021    COLONOSCOPY DIAGNOSTIC performed by Leora Villatoro MD at [address] [place] [address] N/A 12/12/2021    COLONOSCOPY FLEXIBLE W/ DECOMPRESSION performed by Saranya Castanon MD at [address] [place] [address] History:  No family history on file. Social History     Socioeconomic History    Marital status: Single     Spouse name: Not on file    Number of children: Not on file    Years of education: Not on file    Highest education level: Not on file   Occupational History    Not on file   Tobacco Use    Smoking status: Never    Smokeless tobacco: Never   Substance and Sexual Activity    Alcohol use: Not Currently    Drug use: Never    Sexual activity: Not on file   Other Topics Concern    Not on file   Social History Narrative    Not on file     Social Determinants of Health     Financial Resource Strain: Not on file   Food Insecurity: Not on file   Transportation Needs: Not on file   Physical Activity: Not on file   Stress: Not on file   Social Connections: Not on file   Intimate Partner Violence: Not on file   Housing Stability: Not on file       Nursing notes reviewed. ED Triage Vitals   Enc Vitals Group      BP       Pulse       Resp       Temp       Temp src       SpO2       Weight       Height       Head Circumference       Peak Flow       Pain Score       Pain Loc       Pain Edu? Excl. in [address]? GENERAL:  Awake, alert.  Well developed, well nourished with no apparent distress. HENT:  Normocephalic, Atraumatic, moist mucous membranes. EYES:  Pupils equal round and reactive to light, Conjunctiva normal, extraocular movements normal.  NECK:  No meningeal signs, Supple. CHEST: Bradycardic and regular, chest wall non-tender. LUNGS:  Clear to auscultation bilaterally. ABDOMEN:  Soft, non-tender, no rebound, rigidity or guarding, non-distended, normal bowel sounds. No costovertebral angle tenderness to palpation. BACK:  No tenderness. EXTREMITIES:  Normal range of motion, no edema, no bony tenderness, distal pulses present. SKIN: Warm, dry and intact. NEUROLOGIC: Normal mental status. Strength 4-5 on the right, 5/5 on left. Slight slurring of the speech. Sensation and reflexes intact. Finger-to-nose intact. LABS and DIAGNOSTIC RESULTS  EKG  The Ekg interpreted by me shows  sinus bradycardia, rate=46    Axis is   Normal  QTc is  normal  Intervals and Durations are unremarkable. ST Segments: no acute change  Delta waves, Brugada Syndrome, and Short MA are not present. No significant change from prior EKG dated 8/24/21    RADIOLOGY  X-RAYS:  I have reviewed radiologic plain film image(s). ALL OTHER NON-PLAIN FILM IMAGES SUCH AS CT, ULTRASOUND AND MRI HAVE BEEN READ BY THE RADIOLOGIST.  MRI BRAIN WO CONTRAST   Preliminary Result   Cerebral atrophy. Severe chronic small vessel ischemic changes. Remote   lacunar infarcts in the left occipital lobe, left periventricular white   matter and basal ganglia, and central leigh. Wallerian degeneration on the   left. No areas of restricted diffusion to suggest an acute ischemic event. XR CHEST PORTABLE   Final Result   No radiographic evidence of an acute cardiopulmonary process. CT head without contrast   Final Result   No acute intracranial abnormality.               LABS  Labs Reviewed   CBC WITH AUTO DIFFERENTIAL - Abnormal; Notable for the following components:       Result Value    RBC 3.53 (*) Hemoglobin 10.5 (*)     Hematocrit 32.3 (*)     RDW 16.4 (*)     Lymphocytes Absolute 0.8 (*)     All other components within normal limits   COMPREHENSIVE METABOLIC PANEL W/ REFLEX TO MG FOR LOW K - Abnormal; Notable for the following components:    CO2 13 (*)     Glucose 140 (*)     BUN 41 (*)     Creatinine 2.9 (*)     GFR Non- 21 (*)     GFR African American 26 (*)     Alkaline Phosphatase 173 (*)     ALT 9 (*)     AST 10 (*)     All other components within normal limits    Narrative:     Rupal Avery tel. [phone],  Chemistry results called to and read back by Dixie Calabrese RN, 09/24/2022  12:51, by [id number] TO CULTURE - Abnormal; Notable for the following components:    Clarity, UA CLOUDY (*)     Blood, Urine SMALL (*)     Protein, UA 30 (*)     Leukocyte Esterase, Urine SMALL (*)     All other components within normal limits   ACETAMINOPHEN LEVEL - Abnormal; Notable for the following components:    Acetaminophen Level <5 (*)     All other components within normal limits    Narrative:     Rupal Avery tel. [phone],  Chemistry results called to and read back by Dixie Calabrese RN, 09/24/2022  39:93, by [id number] - Abnormal; Notable for the following components:    Salicylate, Serum <8.4 (*)     All other components within normal limits    Narrative:     Rupal Avery tel. [phone],  Chemistry results called to and read back by Dixie Calabrese RN, 09/24/2022  12:51, by [address] - Abnormal; Notable for the following components:    Bacteria, UA 1+ (*)     WBC, UA 21 (*)     Mucus, UA Present (*)     All other components within normal limits   COMPREHENSIVE METABOLIC PANEL W/ REFLEX TO MG FOR LOW K - Abnormal; Notable for the following components:    Potassium reflex Magnesium 3.2 (*)     Chloride 117 (*)     CO2 11 (*)     Glucose 66 (*)     BUN 29 (*)     Creatinine 2.1 (*)     GFR Non- 31 (*)     GFR  American 37 (*)     Calcium 7.2 (*)     Total Protein 5.4 (*)     Albumin 2.9 (*)     ALT <5 (*)     AST 9 (*)     All other components within normal limits    Narrative:     Bushra Robin  SKK5W tel. [phone],  Previous panic on this admission - call not needed per SOP, 09/25/2022 06:55,  by [facility]   MAGNESIUM - Abnormal; Notable for the following components:    Magnesium 1.40 (*)     All other components within normal limits    Narrative:     THIAGO Pierce  SKK5W tel. [phone],  Previous panic on this admission - call not needed per SOP, 09/25/2022 06:55,  by [address] - Abnormal; Notable for the following components:    CO2 16 (*)     Glucose 124 (*)     BUN 32 (*)     Creatinine 2.3 (*)     GFR Non- 28 (*)     GFR  33 (*)     All other components within normal limits   CBC - Abnormal; Notable for the following components:    RBC 3.37 (*)     Hemoglobin 10.0 (*)     Hematocrit 30.4 (*)     RDW 15.7 (*)     All other components within normal limits   RENAL FUNCTION PANEL - Abnormal; Notable for the following components:    Sodium 135 (*)     Potassium 3.1 (*)     CO2 17 (*)     Glucose 159 (*)     BUN 34 (*)     Creatinine 2.2 (*)     GFR Non- 29 (*)     GFR  35 (*)     Albumin 3.2 (*)     All other components within normal limits   CBC WITH AUTO DIFFERENTIAL - Abnormal; Notable for the following components:    RBC 3.06 (*)     Hemoglobin 9.0 (*)     Hematocrit 27.5 (*)     RDW 15.6 (*)     All other components within normal limits   RENAL FUNCTION PANEL - Abnormal; Notable for the following components:    Potassium 3.1 (*)     CO2 18 (*)     Glucose 106 (*)     BUN 27 (*)     Creatinine 1.9 (*)     GFR Non- 34 (*)     GFR  42 (*)     Calcium 8.1 (*)     Albumin 3.2 (*)     All other components within normal limits   CBC WITH AUTO DIFFERENTIAL - Abnormal; Notable for the following components:    RBC 2.88 (*)     Hemoglobin 8.6 (*)     Hematocrit 25.8 (*)     RDW 15.7 (*)     All other components within normal limits   POCT GLUCOSE - Abnormal; Notable for the following components:    POC Glucose 118 (*)     All other components within normal limits   POCT GLUCOSE - Abnormal; Notable for the following components:    POC Glucose 116 (*)     All other components within normal limits   POCT GLUCOSE - Abnormal; Notable for the following components:    POC Glucose 117 (*)     All other components within normal limits   POCT GLUCOSE - Abnormal; Notable for the following components:    POC Glucose 101 (*)     All other components within normal limits   POCT GLUCOSE - Abnormal; Notable for the following components:    POC Glucose 134 (*)     All other components within normal limits   POCT GLUCOSE - Abnormal; Notable for the following components:    POC Glucose 157 (*)     All other components within normal limits   POCT GLUCOSE - Abnormal; Notable for the following components:    POC Glucose 195 (*)     All other components within normal limits   POCT GLUCOSE - Abnormal; Notable for the following components:    POC Glucose 151 (*)     All other components within normal limits   POCT GLUCOSE - Abnormal; Notable for the following components:    POC Glucose 163 (*)     All other components within normal limits   POCT GLUCOSE - Abnormal; Notable for the following components:    POC Glucose 166 (*)     All other components within normal limits   POCT GLUCOSE - Abnormal; Notable for the following components:    POC Glucose 168 (*)     All other components within normal limits   POCT GLUCOSE - Abnormal; Notable for the following components:    POC Glucose 118 (*)     All other components within normal limits   POCT GLUCOSE - Abnormal; Notable for the following components:    POC Glucose 157 (*)     All other components within normal limits   POCT GLUCOSE - Abnormal; Notable for the following components:    POC Glucose 183 (*) All other components within normal limits   POCT GLUCOSE - Abnormal; Notable for the following components:    POC Glucose 196 (*)     All other components within normal limits   CULTURE, URINE    Narrative:     ORDER#: [id number]                          ORDERED BY: JACQUELINE TARANGO  SOURCE: Urine Clean Catch                  COLLECTED:  09/24/22 11:36  ANTIBIOTICS AT [place].:                      RECEIVED :  09/24/22 17:23   C DIFF TOXIN/ANTIGEN    Narrative:     ORDER#: [id number]                          ORDERED BY: POLO JIMENEZ  SOURCE: Stool                              COLLECTED:  09/25/22 16:10  ANTIBIOTICS AT [place].:                      RECEIVED :  09/25/22 16:28  Collect White vial (sterile container)   COVID-19, RAPID   TROPONIN   ETHANOL    Narrative:     Nanette Alonso tel. [phone],  Chemistry results called to and read back by Linnea Walker RN, 09/24/2022  12:51, by [address] [employer]   HEMOGLOBIN A1C   MAGNESIUM   MAGNESIUM   POCT GLUCOSE   POCT GLUCOSE   POCT GLUCOSE   POCT GLUCOSE   POCT GLUCOSE   POCT GLUCOSE   POCT GLUCOSE   POCT GLUCOSE   POCT GLUCOSE   POCT GLUCOSE   POCT GLUCOSE   POCT GLUCOSE   POCT GLUCOSE   POCT [facility]        Patient is bradycardic. Otherwise Maintaining a normal blood pressure. He does have slight GILL. I believe that it is likely that his bradycardia and GILL led to his fall today. Unclear if it was definitely syncopal fall or not. Patient be admitted for further treatment evaluation. The total Critical Care time is 30 minutes which excludes separately billable procedures. The critical care was concerning IV fluid bolus for treatment of dehydration and acute kidney injury. .  This time is exclusive of any time documented by any other providers. I spoke with the hospitalist. We thoroughly discussed the history, physical exam, laboratory and imaging studies, as well as, emergency department course.  Based upon that discussion, we've decided to admit Luly Deal for further observation and evaluation of Usman Seymour's fall. As I have deemed necessary from their history, physical, and studies, I have considered and evaluated Luly Deal for the following diagnoses:        FINAL IMPRESSION  1. GILL (acute kidney injury) ([facility] [address]    2. Fall, initial encounter    3. Altered mental status, unspecified altered mental status type        Vitals:  Blood pressure (!) 144/62, pulse 54, temperature 97.6 °F (36.4 °C), temperature source Oral, resp. rate 16, height 5' 7\" (1.702 m), weight 200 lb 6.4 oz (90.9 kg), SpO2 99 %. Disposition  Pt is in stable condition upon Admit to telemetry. This chart was generated using the [address] [place] [address] [address]  dictation system. I created this record but it may contain dictation errors.           Aleks Welsh MD  09/29/22 9347

## 2022-09-24 NOTE — ED NOTES
Patient remains alert and oreinted. Pain score and patient condition reviewed. No acute distress noted. Report given to Reynaldo Sommers recieving patient.         Marbin Shannon RN  09/24/22 4343

## 2022-09-24 NOTE — ED NOTES

## 2022-09-24 NOTE — PROGRESS NOTES
Pharmacy Medication Reconciliation Note     List of medications Juarez Whitten is currently taking is complete. Source of information:   1. Conversation with patient and family at bedside and by phone  2. EMR    Notes regarding home medications:   1. Patient reports he took all AM meds PTA in the ED  2.  Dilt now 120 not 180 mg     Patient denies taking any OTC or herbal medications    Jaylon Rowland, Pharmacy Intern  9/24/2022  5:46 PM

## 2022-09-24 NOTE — ED TRIAGE NOTES
Pt arrived to dept via ems. Pt c/o slurred speech for past two days. Hx stroke with right arm deficit. Complains of no pain or dizziness. Take thinners. Pt awake, alert and oriented x 3. Skin warm and dry/normal color for ethnicity. Resp easy and unlabored. Pt placed in gown and on cardiac monitor. Call light in reach. Will continue to monitor.

## 2022-09-24 NOTE — H&P
Hospital Medicine History & Physical      PCP: OBI Beckett CNP    Date of Admission: 9/24/2022    Date of Service: Pt seen/examined on 9/25/2022   and Admitted to Inpatient with expected LOS greater than two midnights due to medical therapy. Chief Complaint: Weakness slurred speech      History Of Present Illness:     64 y.o. male who presented to [facility] with increasing slurring of speech over the last 2 days. Patient has had a history of previous stroke in 2015 and has some baseline dysarthria but son felt that it was slightly worse. He was noted to be more weak and has not been eating and drinking much and was brought here to the hospital for evaluation. Patient denies focal weakness. He does have some thickened speech. He denies fevers chills chest pain or abdominal pain. Past Medical History:          Diagnosis Date    Cerebral artery occlusion with cerebral infarction ([facility] [address]     Diabetes mellitus ([facility] [address]     Hypertension        Past Surgical History:          Procedure Laterality Date    COLECTOMY N/A 12/15/2021    SIGMOID COLECTOMY performed by Facundo Leslie MD at [address] N/A 11/13/2021    COLONOSCOPY DIAGNOSTIC performed by Lou Mcneill MD at [address] [place] [address] N/A 12/12/2021    COLONOSCOPY FLEXIBLE W/ DECOMPRESSION performed by Laura Mcmahan MD at [address] [facility]       Medications Prior to Admission:      Prior to Admission medications    Medication Sig Start Date End Date Taking?  Authorizing Provider   sertraline (ZOLOFT) 25 MG tablet Take 25 mg by mouth every evening   Yes Historical Provider, MD   docusate sodium (COLACE) 100 MG capsule Take 100 mg by mouth daily    Historical Provider, MD   dilTIAZem (DILACOR XR) 120 MG extended release capsule Take 120 mg by mouth daily    Historical Provider, MD   potassium chloride (KLOR-CON M) 20 MEQ extended release tablet Take 20 mEq by mouth 2 times daily    Historical Provider, MD   ferrous sulfate (FE TABS 325) 325 (65 Fe) MG EC tablet Take 325 mg by mouth 2 times daily    Historical Provider, MD   bisacodyl (DULCOLAX) 5 MG EC tablet Take 10 mg by mouth daily as needed for Constipation    Historical Provider, MD   allopurinol (ZYLOPRIM) 100 MG tablet Take 100 mg by mouth daily    Historical Provider, MD   apixaban (ELIQUIS) 5 MG TABS tablet Take 5 mg by mouth 2 times daily    Historical Provider, MD   atorvastatin (LIPITOR) 20 MG tablet Take 20 mg by mouth every evening    Historical Provider, MD   Cholecalciferol 50 MCG (2000 UT) TABS Take 50 mcg by mouth daily    Historical Provider, MD   omeprazole (PRILOSEC) 20 MG delayed release capsule Take 20 mg by mouth daily    Historical Provider, MD       Allergies:  Patient has no known allergies. Social History:      The patient currently lives at home with family. He is wheelchair-bound    TOBACCO:   reports that he has never smoked. He has never used smokeless tobacco.  ETOH:   reports that he does not currently use alcohol. E-cigarette/Vaping       Questions Responses    E-cigarette/Vaping Use     Start Date     Passive Exposure     Quit Date     Counseling Given     Comments               Family History:       Reviewed and negative in regards to presenting illness/complaint. History reviewed. No pertinent family history. REVIEW OF SYSTEMS COMPLETED:   Pertinent positives as noted in the HPI. All other systems reviewed and negative. PHYSICAL EXAM PERFORMED:    BP (!) 141/87   Pulse 85   Temp 97.4 °F (36.3 °C) (Oral)   Resp 15   Ht 5' 7\" (1.702 m)   Wt 195 lb 5.2 oz (88.6 kg)   SpO2 100%   BMI 30.59 kg/m²     General appearance:  No apparent distress, appears stated age and cooperative. HEENT:  Normal cephalic, atraumatic without obvious deformity. Pupils equal, round, and reactive to light. Extra ocular muscles intact. Conjunctivae/corneas clear.   Oral cavity dry tacky mucous membranes  Neck: Supple, with full range of motion. No jugular venous distention. Trachea midline. Respiratory:  Normal respiratory effort. Clear to auscultation, bilaterally without Rales/Wheezes/Rhonchi. Cardiovascular:  Regular rate and rhythm with normal S1/S2 without murmurs, rubs or gallops. Abdomen: Soft, non-tender, non-distended with normal bowel sounds. Musculoskeletal:  No clubbing, cyanosis or edema bilaterally. Full range of motion without deformity. Skin: Skin color, texture, turgor normal.  No rashes or lesions. Neurologic: Dysarthric speech neurovascularly intact without any focal sensory/motor deficits. Cranial nerves: II-XII intact, grossly non-focal.  Follows command  Psychiatric:  Alert and oriented, thought content appropriate, normal insight  Capillary Refill: Brisk,3 seconds, normal  Peripheral Pulses: +2 palpable, equal bilaterally       Labs:     Recent Labs     09/24/22  1144   WBC 8.9   HGB 10.5*   HCT 32.3*        Recent Labs     09/24/22  1144      K 4.3      CO2 13*   BUN 41*   CREATININE 2.9*   CALCIUM 9.7     Recent Labs     09/24/22  1144   AST 10*   ALT 9*   BILITOT 0.3   ALKPHOS 173*     No results for input(s): INR in the last 72 hours. Recent Labs     09/24/22  1144   TROPONINI 0.01       Urinalysis:      Lab Results   Component Value Date/Time    NITRU Negative 09/24/2022 04:27 PM    [address] 09/24/2022 04:27 PM    BACTERIA 1+ 09/24/2022 04:27 PM    RBCUA 1 09/24/2022 04:27 PM    BLOODU SMALL 09/24/2022 04:27 PM    SPECGRAV 1.012 09/24/2022 04:27 PM    GLUCOSEU Negative 09/24/2022 04:27 PM       Radiology:       XR CHEST PORTABLE   Final Result   No radiographic evidence of an acute cardiopulmonary process. CT head without contrast   Final Result   No acute intracranial abnormality.              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 Avoid nephrotoxins  Continue Cardizem for rate rhythm control  Patient sees Dr. Arun Falcon at [facility] for kidney issues. Consider bicarbonate tablets    DVT Prophylaxis: Eliquis  Diet: ADULT DIET; Regular  Code Status: Full Code    PT/OT Eval Status: Ordered    Dispo -1 to 2 days       Samuel Pedraza MD    Thank you OBI Bennett CNP for the opportunity to be involved in this patient's care. If you have any questions or concerns please feel free to contact me at [phone].

## 2022-09-24 NOTE — ED NOTES
Pt still unable to void into urinal. Refusing urinary catheter at this time.       Dayan Plaza RN  09/24/22 0295

## 2022-09-25 LAB
A/G RATIO: 1.2 (ref 1.1–2.2)
ALBUMIN SERPL-MCNC: 2.9 G/DL (ref 3.4–5)
ALP BLD-CCNC: 115 U/L (ref 40–129)
ALT SERPL-CCNC: <5 U/L (ref 10–40)
ANION GAP SERPL CALCULATED.3IONS-SCNC: 11 MMOL/L (ref 3–16)
AST SERPL-CCNC: 9 U/L (ref 15–37)
BILIRUB SERPL-MCNC: <0.2 MG/DL (ref 0–1)
BUN BLDV-MCNC: 29 MG/DL (ref 7–20)
C DIFF TOXIN/ANTIGEN: NORMAL
CALCIUM SERPL-MCNC: 7.2 MG/DL (ref 8.3–10.6)
CHLORIDE BLD-SCNC: 117 MMOL/L (ref 99–110)
CO2: 11 MMOL/L (ref 21–32)
CREAT SERPL-MCNC: 2.1 MG/DL (ref 0.8–1.3)
ESTIMATED AVERAGE GLUCOSE: 134.1 MG/DL
GFR AFRICAN AMERICAN: 37
GFR NON-AFRICAN AMERICAN: 31
GLUCOSE BLD-MCNC: 116 MG/DL (ref 70–99)
GLUCOSE BLD-MCNC: 117 MG/DL (ref 70–99)
GLUCOSE BLD-MCNC: 118 MG/DL (ref 70–99)
GLUCOSE BLD-MCNC: 66 MG/DL (ref 70–99)
HBA1C MFR BLD: 6.3 %
MAGNESIUM: 1.4 MG/DL (ref 1.8–2.4)
PERFORMED ON: ABNORMAL
POTASSIUM REFLEX MAGNESIUM: 3.2 MMOL/L (ref 3.5–5.1)
SODIUM BLD-SCNC: 139 MMOL/L (ref 136–145)
TOTAL PROTEIN: 5.4 G/DL (ref 6.4–8.2)
URINE CULTURE, ROUTINE: NORMAL

## 2022-09-25 PROCEDURE — 94760 N-INVAS EAR/PLS OXIMETRY 1: CPT

## 2022-09-25 PROCEDURE — 36415 COLL VENOUS BLD VENIPUNCTURE: CPT

## 2022-09-25 PROCEDURE — 2580000003 HC RX 258: Performed by: INTERNAL MEDICINE

## 2022-09-25 PROCEDURE — 6360000002 HC RX W HCPCS: Performed by: INTERNAL MEDICINE

## 2022-09-25 PROCEDURE — 6370000000 HC RX 637 (ALT 250 FOR IP): Performed by: NURSE PRACTITIONER

## 2022-09-25 PROCEDURE — 83735 ASSAY OF MAGNESIUM: CPT

## 2022-09-25 PROCEDURE — 80053 COMPREHEN METABOLIC PANEL: CPT

## 2022-09-25 PROCEDURE — 83036 HEMOGLOBIN GLYCOSYLATED A1C: CPT

## 2022-09-25 PROCEDURE — 87324 CLOSTRIDIUM AG IA: CPT

## 2022-09-25 PROCEDURE — 87449 NOS EACH ORGANISM AG IA: CPT

## 2022-09-25 PROCEDURE — 6370000000 HC RX 637 (ALT 250 FOR IP): Performed by: INTERNAL MEDICINE

## 2022-09-25 PROCEDURE — 2500000003 HC RX 250 WO HCPCS: Performed by: INTERNAL MEDICINE

## 2022-09-25 PROCEDURE — 2060000000 HC ICU INTERMEDIATE R&B

## 2022-09-25 RX ORDER — POTASSIUM CHLORIDE 20 MEQ/1
20 TABLET, EXTENDED RELEASE ORAL ONCE
Status: COMPLETED | OUTPATIENT
Start: 2022-09-25 | End: 2022-09-25

## 2022-09-25 RX ORDER — DEXTROSE MONOHYDRATE 100 MG/ML
INJECTION, SOLUTION INTRAVENOUS CONTINUOUS PRN
Status: DISCONTINUED | OUTPATIENT
Start: 2022-09-25 | End: 2022-09-29 | Stop reason: HOSPADM

## 2022-09-25 RX ORDER — MAGNESIUM SULFATE IN WATER 40 MG/ML
2000 INJECTION, SOLUTION INTRAVENOUS ONCE
Status: COMPLETED | OUTPATIENT
Start: 2022-09-25 | End: 2022-09-25

## 2022-09-25 RX ORDER — INSULIN LISPRO 100 [IU]/ML
0-4 INJECTION, SOLUTION INTRAVENOUS; SUBCUTANEOUS
Status: DISCONTINUED | OUTPATIENT
Start: 2022-09-25 | End: 2022-09-29 | Stop reason: HOSPADM

## 2022-09-25 RX ORDER — INSULIN LISPRO 100 [IU]/ML
0-4 INJECTION, SOLUTION INTRAVENOUS; SUBCUTANEOUS NIGHTLY
Status: DISCONTINUED | OUTPATIENT
Start: 2022-09-25 | End: 2022-09-29 | Stop reason: HOSPADM

## 2022-09-25 RX ADMIN — Medication: at 14:58

## 2022-09-25 RX ADMIN — APIXABAN 5 MG: 5 TABLET, FILM COATED ORAL at 21:00

## 2022-09-25 RX ADMIN — DILTIAZEM HYDROCHLORIDE 120 MG: 120 CAPSULE, COATED, EXTENDED RELEASE ORAL at 09:53

## 2022-09-25 RX ADMIN — POTASSIUM CHLORIDE 20 MEQ: 1500 TABLET, EXTENDED RELEASE ORAL at 11:25

## 2022-09-25 RX ADMIN — ATORVASTATIN CALCIUM 20 MG: 20 TABLET, FILM COATED ORAL at 21:00

## 2022-09-25 RX ADMIN — ALLOPURINOL 100 MG: 100 TABLET ORAL at 09:53

## 2022-09-25 RX ADMIN — MAGNESIUM SULFATE HEPTAHYDRATE 2000 MG: 40 INJECTION, SOLUTION INTRAVENOUS at 11:28

## 2022-09-25 RX ADMIN — SODIUM CHLORIDE: 9 INJECTION, SOLUTION INTRAVENOUS at 08:37

## 2022-09-25 RX ADMIN — APIXABAN 5 MG: 5 TABLET, FILM COATED ORAL at 09:53

## 2022-09-25 RX ADMIN — SERTRALINE HYDROCHLORIDE 25 MG: 25 TABLET ORAL at 21:00

## 2022-09-25 ASSESSMENT — PAIN SCALES - GENERAL
PAINLEVEL_OUTOF10: 0
PAINLEVEL_OUTOF10: 0

## 2022-09-25 NOTE — PLAN OF CARE
Problem: Discharge Planning  Goal: Discharge to home or other facility with appropriate resources  Outcome: Progressing  Flowsheets (Taken 9/25/2022 0259)  Discharge to home or other facility with appropriate resources: Identify barriers to discharge with patient and caregiver     Problem: Pain  Goal: Verbalizes/displays adequate comfort level or baseline comfort level  Outcome: Progressing

## 2022-09-25 NOTE — H&P
Seen and examined      Full h and p to follow      63 yo with weakness falls  Hx of stroke in past    Some slurred speech in ED (son feels this is normal for him. Stroke 15 years ago)        Impression    Poss TIA  ARF ? Pre renal vs obstructive        Plan    Urine na and urine cr  Bladder scan.  May need potts or isc  Gentle fluids  MRI in am  Avoid nephrotoxins  PT OT eval        Electronically signed by Edy Etienne MD on 9/24/2022 at 8:31 PM

## 2022-09-25 NOTE — PROGRESS NOTES
Hospitalist Progress Note      PCP: Supriya Osullivan, APRN - CNP    Date of Admission: 9/24/2022    Chief Complaint: Weakness slurred speech    Hospital Course: 42-year-old male admitted to the hospital with weakness and slurred speech. Patient has previous history of large stroke in 2015 with residual dysarthria. Son felt slowing was worse. Noted on presentation to have acute on chronic renal failure. Admitted for care    Subjective: Patient now having diarrhea  He is overall in good spirits and alert. His speech appears to be about the same as yesterday. (Baseline?)    Medications:  Reviewed    Infusion Medications    sodium bicarbonate infusion      dextrose      sodium chloride       Scheduled Medications    insulin lispro  0-4 Units SubCUTAneous TID WC    insulin lispro  0-4 Units SubCUTAneous Nightly    allopurinol  100 mg Oral Daily    apixaban  5 mg Oral BID    atorvastatin  20 mg Oral Nightly    sodium chloride flush  5-40 mL IntraVENous 2 times per day    dilTIAZem  120 mg Oral Daily    sertraline  25 mg Oral Nightly     PRN Meds: glucose, dextrose bolus **OR** dextrose bolus, glucagon (rDNA), dextrose, sodium chloride flush, sodium chloride, ondansetron **OR** ondansetron, acetaminophen **OR** acetaminophen      Intake/Output Summary (Last 24 hours) at 9/25/2022 1333  Last data filed at 9/25/2022 0958  Gross per 24 hour   Intake 300 ml   Output 475 ml   Net -175 ml       Physical Exam Performed:    BP (!) 141/94   Pulse 70   Temp 97.2 °F (36.2 °C) (Oral)   Resp 13   Ht 5' 7\" (1.702 m)   Wt 197 lb 1.5 oz (89.4 kg)   SpO2 97%   BMI 30.87 kg/m²     General appearance: No apparent distress, appears stated age and cooperative. HEENT: Pupils equal, round, and reactive to light. Conjunctivae/corneas clear. Neck: Supple, with full range of motion. No jugular venous distention. Trachea midline. Respiratory:  Normal respiratory effort.  Clear to auscultation, bilaterally without Rales/Wheezes/Rhonchi. Cardiovascular: Regular rate and rhythm with normal S1/S2 without murmurs, rubs or gallops. Abdomen: Soft, distended  Musculoskeletal: No clubbing, cyanosis or edema bilaterally. Full range of motion without deformity. Skin: Skin color, texture, turgor normal.  No rashes or lesions. Neurologic: Dysarthric speech  Psychiatric: Alert and oriented, thought content appropriate, normal insight  Capillary Refill: Brisk, 3 seconds, normal   Peripheral Pulses: +2 palpable, equal bilaterally       Labs:   Recent Labs     09/24/22  1144   WBC 8.9   HGB 10.5*   HCT 32.3*        Recent Labs     09/24/22  1144 09/25/22  0609    139   K 4.3 3.2*    117*   CO2 13* 11*   BUN 41* 29*   CREATININE 2.9* 2.1*   CALCIUM 9.7 7.2*     Recent Labs     09/24/22  1144 09/25/22  0609   AST 10* 9*   ALT 9* <5*   BILITOT 0.3 <0.2   ALKPHOS 173* 115     No results for input(s): INR in the last 72 hours. Recent Labs     09/24/22  1144   TROPONINI 0.01       Urinalysis:      Lab Results   Component Value Date/Time    NITRU Negative 09/24/2022 04:27 PM    [address] 09/24/2022 04:27 PM    BACTERIA 1+ 09/24/2022 04:27 PM    RBCUA 1 09/24/2022 04:27 PM    BLOODU SMALL 09/24/2022 04:27 PM    SPECGRAV 1.012 09/24/2022 04:27 PM    GLUCOSEU Negative 09/24/2022 04:27 PM       Radiology:  XR CHEST PORTABLE   Final Result   No radiographic evidence of an acute cardiopulmonary process. CT head without contrast   Final Result   No acute intracranial abnormality. Assessment/Plan:    Acute renal failure on chronic  Improved.   Creatinine close to baseline    Metabolic acidosis  Likely has RTA from diarrhea  Will start bicarbonate drip  Recheck levels tomorrow    Possible TIA versus stroke  MRI of the brain ordered for tomorrow  PT OT speech working with the patient    Chronic atrial fibrillation  Continue Eliquis and Cardizem for stroke prophylaxis and rate control respectively    Diarrhea  Will rule out C. 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 Son states his abdomen is usually normally distended mildly    DVT Prophylaxis: Eliquis  Diet: ADULT DIET; Regular  Code Status: Full Code  PT/OT Eval Status: Ordered    Dispo -PT OT to see.   Patient may require higher level of care at discharge such as a skilled facility        Tamica Stanford MD

## 2022-09-26 ENCOUNTER — APPOINTMENT (OUTPATIENT)
Dept: MRI IMAGING | Age: 77
DRG: 057 | End: 2022-09-26
Payer: MEDICARE

## 2022-09-26 LAB
ANION GAP SERPL CALCULATED.3IONS-SCNC: 11 MMOL/L (ref 3–16)
BUN BLDV-MCNC: 32 MG/DL (ref 7–20)
CALCIUM SERPL-MCNC: 8.7 MG/DL (ref 8.3–10.6)
CHLORIDE BLD-SCNC: 110 MMOL/L (ref 99–110)
CO2: 16 MMOL/L (ref 21–32)
CREAT SERPL-MCNC: 2.3 MG/DL (ref 0.8–1.3)
GFR AFRICAN AMERICAN: 33
GFR NON-AFRICAN AMERICAN: 28
GLUCOSE BLD-MCNC: 101 MG/DL (ref 70–99)
GLUCOSE BLD-MCNC: 124 MG/DL (ref 70–99)
GLUCOSE BLD-MCNC: 134 MG/DL (ref 70–99)
GLUCOSE BLD-MCNC: 157 MG/DL (ref 70–99)
GLUCOSE BLD-MCNC: 195 MG/DL (ref 70–99)
HCT VFR BLD CALC: 30.4 % (ref 40.5–52.5)
HEMOGLOBIN: 10 G/DL (ref 13.5–17.5)
MAGNESIUM: 2.3 MG/DL (ref 1.8–2.4)
MCH RBC QN AUTO: 29.6 PG (ref 26–34)
MCHC RBC AUTO-ENTMCNC: 32.9 G/DL (ref 31–36)
MCV RBC AUTO: 90 FL (ref 80–100)
PDW BLD-RTO: 15.7 % (ref 12.4–15.4)
PERFORMED ON: ABNORMAL
PLATELET # BLD: 176 K/UL (ref 135–450)
PMV BLD AUTO: 9.5 FL (ref 5–10.5)
POTASSIUM SERPL-SCNC: 3.5 MMOL/L (ref 3.5–5.1)
RBC # BLD: 3.37 M/UL (ref 4.2–5.9)
SODIUM BLD-SCNC: 137 MMOL/L (ref 136–145)
WBC # BLD: 7.2 K/UL (ref 4–11)

## 2022-09-26 PROCEDURE — 85027 COMPLETE CBC AUTOMATED: CPT

## 2022-09-26 PROCEDURE — 97166 OT EVAL MOD COMPLEX 45 MIN: CPT

## 2022-09-26 PROCEDURE — 2500000003 HC RX 250 WO HCPCS: Performed by: INTERNAL MEDICINE

## 2022-09-26 PROCEDURE — 6370000000 HC RX 637 (ALT 250 FOR IP): Performed by: NURSE PRACTITIONER

## 2022-09-26 PROCEDURE — 36415 COLL VENOUS BLD VENIPUNCTURE: CPT

## 2022-09-26 PROCEDURE — 97530 THERAPEUTIC ACTIVITIES: CPT

## 2022-09-26 PROCEDURE — 94760 N-INVAS EAR/PLS OXIMETRY 1: CPT

## 2022-09-26 PROCEDURE — 83735 ASSAY OF MAGNESIUM: CPT

## 2022-09-26 PROCEDURE — 2580000003 HC RX 258: Performed by: INTERNAL MEDICINE

## 2022-09-26 PROCEDURE — 97162 PT EVAL MOD COMPLEX 30 MIN: CPT | Performed by: PHYSICAL THERAPIST

## 2022-09-26 PROCEDURE — 80048 BASIC METABOLIC PNL TOTAL CA: CPT

## 2022-09-26 PROCEDURE — 92610 EVALUATE SWALLOWING FUNCTION: CPT

## 2022-09-26 PROCEDURE — 2060000000 HC ICU INTERMEDIATE R&B

## 2022-09-26 PROCEDURE — 70551 MRI BRAIN STEM W/O DYE: CPT

## 2022-09-26 PROCEDURE — 6370000000 HC RX 637 (ALT 250 FOR IP): Performed by: HOSPITALIST

## 2022-09-26 PROCEDURE — 97530 THERAPEUTIC ACTIVITIES: CPT | Performed by: PHYSICAL THERAPIST

## 2022-09-26 PROCEDURE — 6370000000 HC RX 637 (ALT 250 FOR IP): Performed by: INTERNAL MEDICINE

## 2022-09-26 RX ORDER — LORAZEPAM 1 MG/1
1 TABLET ORAL ONCE
Status: COMPLETED | OUTPATIENT
Start: 2022-09-26 | End: 2022-09-26

## 2022-09-26 RX ADMIN — ALLOPURINOL 100 MG: 100 TABLET ORAL at 08:26

## 2022-09-26 RX ADMIN — DILTIAZEM HYDROCHLORIDE 120 MG: 120 CAPSULE, COATED, EXTENDED RELEASE ORAL at 08:26

## 2022-09-26 RX ADMIN — Medication: at 13:43

## 2022-09-26 RX ADMIN — APIXABAN 5 MG: 5 TABLET, FILM COATED ORAL at 08:26

## 2022-09-26 RX ADMIN — SERTRALINE HYDROCHLORIDE 25 MG: 25 TABLET ORAL at 21:23

## 2022-09-26 RX ADMIN — APIXABAN 5 MG: 5 TABLET, FILM COATED ORAL at 21:23

## 2022-09-26 RX ADMIN — ACETAMINOPHEN 650 MG: 325 TABLET ORAL at 12:40

## 2022-09-26 RX ADMIN — SODIUM CHLORIDE, PRESERVATIVE FREE 10 ML: 5 INJECTION INTRAVENOUS at 10:12

## 2022-09-26 RX ADMIN — ATORVASTATIN CALCIUM 20 MG: 20 TABLET, FILM COATED ORAL at 21:23

## 2022-09-26 RX ADMIN — LORAZEPAM 1 MG: 1 TABLET ORAL at 14:38

## 2022-09-26 ASSESSMENT — PAIN DESCRIPTION - DESCRIPTORS
DESCRIPTORS: DULL
DESCRIPTORS: DULL

## 2022-09-26 ASSESSMENT — PAIN DESCRIPTION - LOCATION
LOCATION: HIP
LOCATION: HIP

## 2022-09-26 ASSESSMENT — PAIN SCALES - GENERAL
PAINLEVEL_OUTOF10: 3
PAINLEVEL_OUTOF10: 3

## 2022-09-26 ASSESSMENT — PAIN DESCRIPTION - PAIN TYPE
TYPE: CHRONIC PAIN
TYPE: CHRONIC PAIN

## 2022-09-26 NOTE — PROGRESS NOTES
[facility]  Phone: [phone]  Fax: [phone]  [facility]. [place]        Thank you for consulting [facility] to assist with management of this patient. However, the patient follows with Dr. Marian Matias. I have notified Dr. Elena Toledo [facility]) of the consult. If any further assistance needed, please contact me at [phone].         Minh Singh MD, PEARL  [facility]

## 2022-09-26 NOTE — PROGRESS NOTES
Occupational Therapy  Facility/Department: [facility]  Occupational Therapy Initial Assessment    Name: Juarez Whitten  : 1945  MRN: [id number]  Date of Service: 2022    Discharge Recommendations:  Patient would benefit from continued therapy after discharge, 3-5 sessions per week  OT Equipment Recommendations  Other: defer to [address] scored a 10/24 on the AM-PAC ADL Inpatient form. Current research shows that an AM-PAC score of 17 or less is typically not associated with a discharge to the patient's home setting. Based on the patient's AM-PAC score and their current ADL deficits, it is recommended that the patient have 3-5 sessions per week of Occupational Therapy at d/c to increase the patient's independence. Please see assessment section for further patient specific details. If patient discharges prior to next session this note will serve as a discharge summary. Please see below for the latest assessment towards goals. Patient Diagnosis(es): The primary encounter diagnosis was GILL (acute kidney injury) ([facility] [address] Diagnoses of Fall, initial encounter and Altered mental status, unspecified altered mental status type were also pertinent to this visit. Past Medical History:  has a past medical history of Cerebral artery occlusion with cerebral infarction ([address] Diabetes mellitus ([address] and Hypertension. Past Surgical History:  has a past surgical history that includes Colonoscopy (N/A, 2021); Colonoscopy (N/A, 2021); and colectomy (N/A, 12/15/2021). Assessment   Performance deficits / Impairments: Decreased functional mobility ; Decreased endurance;Decreased strength;Decreased safe awareness;Decreased ADL status; Decreased high-level IADLs;Decreased balance;Decreased cognition;Decreased ROM  Assessment: 63 yo male who was adm to hosp 2022 with increased weakness and slurred speech greater than his usual deficits.  Unsure pf PLOF as pt vague or providing conflicting information. Today, pt with nonfunctional RUE. Pt required mod A for bed mobility and max A x 2 for brief stand to stedy to move pt towards HOB. Anticipate pt will require max A for ADLs at this time. Pt will benefit from skilled therapy at this time. Prognosis: Good  Decision Making: Medium Complexity  REQUIRES OT FOLLOW-UP: Yes  Activity Tolerance  Activity Tolerance: Patient Tolerated treatment well        Plan   Plan  Times per Week: 3-5  Current Treatment Recommendations: Strengthening, Balance training, Functional mobility training, Endurance training, Self-Care / ADL, Safety education & training     Restrictions  Restrictions/Precautions  Restrictions/Precautions: Fall Risk  Position Activity Restriction  Other position/activity restrictions: flaccid R UE    Subjective   General  Chart Reviewed: Yes  Patient assessed for rehabilitation services?: Yes  Additional Pertinent Hx: Per H&P: \"64year-old male admitted to the hospital with weakness and slurred speech. Patient has previous history of large stroke in 2015 with residual dysarthria. \" CT head negative. MRI pending  Family / Caregiver Present: No  Referring Practitioner: Dr. Matheus Beach  Diagnosis: CVA r/o  Subjective  Subjective: Pt seen bedside and agreable to therapy. Pt vague and providing conflicting information throughout session.   General Comment  Comments: Per RN ok for therapy     Social/Functional History  Social/Functional History  Lives With: Friend(s) (pt reports he lives with 2 friends)  Type of Home: House  Home Layout: Two level, Able to Live on Main level with bedroom/bathroom  Home Access: Ramped entrance  Bathroom Shower/Tub: Walk-in shower, Shower chair with back  Bathroom Toilet: Handicap height  Bathroom Equipment: Grab bars in shower, Grab bars around toilet  Home Equipment: Wheelchair-electric  ADL Assistance: Needs assistance (Aide assists with bathing/dressing)  Ambulation Assistance:  (uses w/c)  Transfer Assistance: Independent  IADL Comments: Sleeps in a hospital bed  Additional Comments: pt vague and giving conflicting information regarding his home situation and if someone assists him       Objective     Observation/Palpation  Observation: R shoulder sublux'd ~ 2 fingers width  Safety Devices  Type of Devices: Call light within reach; Bed alarm in place; Left in bed;Nurse notified;Gait belt        AROM:  (nonfunctional RUE)  Coordination:  (RUE nonfunctional)    ADL  Additional Comments: Anticipate pt will require max A for all ADLs based on balance and ROM observed     Activity Tolerance  Activity Tolerance: Patient tolerated evaluation without incident    Bed mobility  Rolling to Left: Maximum assistance  Rolling to Right: Maximum assistance  Supine to Sit: Moderate assistance (HOB elevated, inc time to move LEs off bed)  Sit to Supine: Moderate assistance;2 Person assistance  Bed Mobility Comments: Pt rolled R/L following EOB to change soiled depends    Pt tolerated sitting EOB ~ 10 min with CGA. Attempted to scoot to simulate pivot transfer however pt unable to offload butt to scoot. Transfers  Sit to stand: 2 Person assistance;Maximum assistance  Stand to sit: Maximum assistance;2 Person assistance  Transfer Comments: Pt stood bed > STEDY with max A x 2. use of stedy to move pt towards HOB.     Vision  Vision: Within Functional Limits  Hearing  Hearing: Within functional limits    Cognition  Overall Cognitive Status: Exceptions  Arousal/Alertness: Appropriate responses to stimuli  Safety Judgement: Decreased awareness of need for assistance;Decreased awareness of need for safety  Insights: Decreased awareness of deficits  Initiation: Requires cues for some  Orientation  Orientation Level: Oriented to person;Disoriented to place;Oriented to time;Disoriented to situation                  Education Given To: Patient  Education Provided: Role of Therapy;Plan of Care;Transfer Training  Education Method: Demonstration;Verbal  Barriers to Learning: Cognition  Education Outcome: Continued education needed    LUE AROM (degrees)  LUE AROM : WFL  Left Hand AROM (degrees)  Left Hand AROM: WFL  RUE AROM (degrees)  RUE General AROM: no shoulder ROM- shoulder sublux'd ~ 1-2 finger widths  Right Hand AROM (degrees)  Right Hand General AROM: wrist significant flex, with PIP flex      AM-PAC Scor  AM-PAC Inpatient Daily Activity Raw Score: 10 (09/26/22 1420)  AM-PAC Inpatient ADL T-Scale Score : 27.31 (09/26/22 1420)  ADL Inpatient CMS 0-100% Score: 74.7 (09/26/22 1420)  ADL Inpatient CMS G-Code Modifier : CL (09/26/22 1420)    Goals  Short Term Goals  Time Frame for Short term goals: Prior to DC: Short Term Goal 1: Pt will complete bed mobility with min A in prep for transfer  Short Term Goal 2: Pt will tolerate sit <> stand with mod A  Short Term Goal 3: Pt will complete grooming tasks with set up  Short Term Goal 4: Pt will complete rolling R/L for supine ADls with min A  Short Term Goal 5: Pt will complete sit pivot transfer with mod A  Patient Goals   Patient goals : no goal stated       Therapy Time   Individual Concurrent Group Co-treatment   Time In 1140         Time Out 1220         Minutes 40         Timed Code Treatment Minutes: 25 Minutes     This note to serve as OT d/c summary if pt is d/c-ed prior to next therapy session.     Pascual Ear, OTR/L

## 2022-09-26 NOTE — PROGRESS NOTES
Hospitalist Progress Note    Patient:  Elly Oquendo  Unit/Bed:F0Q-3167/5129-01   YOB: 1945       MRN: [id number] Acct: [de-identified]  PCP: OBI Duenas CNP    Date of Admission: 9/24/2022  --------------------------    Chief Complaint:     Worsening speech disturbances and weakness    Hospital Course:     Elly Oquendo is a 64 y.o. male hospitalized on 9/24/2022   with history of CVA with residual right-sided weakness and slurred speech presented to the ED noted to have speech disturbances increased weakness. Patient was found to have GILL. Marilyn Dozier Assessment/plan:        Worsening weakness/speech disturbances and residual right-sided hemiplegia  -New stroke versus stroke recrudescence  -No acute finding on CT scan of the  -Awaiting MRI of the brain   -Ativan was given the claustrophobia and anxiety    Acute on chronic CKD  -Creatinine 2.3 today. Baseline approximately 1.6-1.8.  -Consult nephrology      Transient hypoglycemia  -Asymptomatic, continue monitoring      Pseudo hypocalcemia  No treatment necessary    Normocytic anemia  Monitor hemoglobin      Gastroenteritis  , Report diarrhea improving  -C. difficile is negative    Atrial fibrillation  Continue Cardizem and Eliquis        Code Status: Full Code         DVT prophylaxis: Patient on Eliquis     Disposition: Awaiting MRI of the brain    I discussed my thought processes at length with patient/family and patient understood. Question and concerns  Addressed      Discussed with RN      ----------------      Subjective:     Patient seen and examined  Overnight events noted  RN and ancillary staff note reviewed    He has no complaint, still concerned about his speech disturbances  Denied diarrhea      Diet: ADULT DIET;  Dysphagia - Soft and Bite Sized    OBJECTIVE     Exam:  BP (!) 123/93   Pulse 75   Temp 97.3 °F (36.3 °C) (Oral)   Resp 17   Ht 5' 7\" (1.702 m)   Wt 205 lb 11 oz (93.3 kg)   SpO2 100%   BMI 32.22 kg/m² Gen: Not in distress. Alert. Head: Normocephalic. Atraumatic. Eyes: Conjunctivae/corneas clear. ENT: Oral mucosa moist  Neck: No JVD. No obvious thyromegaly. CVS: Nml S1S2, no murmur  , RRR  Pulmomary: Clear bilaterally. No crackles. No wheezes. Gastrointestinal: Soft, non tender, non distend, . Musculoskeletal: No edema. Warm  Neuro: Positive for mild dysarthria, dense right-sided hemiplegia  Psychiatry: Appropriate affect. Not agitated. Medications:  Reviewed    Infusion Medications    sodium bicarbonate infusion 50 mL/hr at 09/26/22 1343    dextrose      sodium chloride       Scheduled Medications    insulin lispro  0-4 Units SubCUTAneous TID WC    insulin lispro  0-4 Units SubCUTAneous Nightly    allopurinol  100 mg Oral Daily    apixaban  5 mg Oral BID    atorvastatin  20 mg Oral Nightly    sodium chloride flush  5-40 mL IntraVENous 2 times per day    dilTIAZem  120 mg Oral Daily    sertraline  25 mg Oral Nightly     PRN Meds: glucose, dextrose bolus **OR** dextrose bolus, glucagon (rDNA), dextrose, sodium chloride flush, sodium chloride, ondansetron **OR** ondansetron, acetaminophen **OR** acetaminophen      Intake/Output Summary (Last 24 hours) at 9/26/2022 1502  Last data filed at 9/26/2022 0953  Gross per 24 hour   Intake 360 ml   Output --   Net 360 ml             Labs:   Recent Labs     09/24/22  1144 09/26/22  0959   WBC 8.9 7.2   HGB 10.5* 10.0*   HCT 32.3* 30.4*    176     Recent Labs     09/24/22  1144 09/25/22  0609 09/26/22  0959    139 137   K 4.3 3.2* 3.5    117* 110   CO2 13* 11* 16*   BUN 41* 29* 32*   CREATININE 2.9* 2.1* 2.3*   CALCIUM 9.7 7.2* 8.7     Recent Labs     09/24/22  1144 09/25/22  0609   AST 10* 9*   ALT 9* <5*   BILITOT 0.3 <0.2   ALKPHOS 173* 115     No results for input(s): INR in the last 72 hours.   Recent Labs     09/24/22  1144   TROPONINI 0.01       Urinalysis:      Lab Results   Component Value Date/Time    NITRU Negative 09/24/2022 04:27 PM    WBCUA 21 09/24/2022 04:27 PM    BACTERIA 1+ 09/24/2022 04:27 PM    RBCUA 1 09/24/2022 04:27 PM    BLOODU SMALL 09/24/2022 04:27 PM    SPECGRAV 1.012 09/24/2022 04:27 PM    GLUCOSEU Negative 09/24/2022 04:27 PM       Radiology:  XR CHEST PORTABLE   Final Result   No radiographic evidence of an acute cardiopulmonary process. CT head without contrast   Final Result   No acute intracranial abnormality.          MRI BRAIN WO CONTRAST    (Results Pending)               Electronically signed by Dominik Stewart MD on 9/26/2022 at 3:02 PM

## 2022-09-26 NOTE — PROGRESS NOTES
Speech Language Pathology  Facility/Department: [address] PROGRESSIVE CARE   CLINICAL BEDSIDE SWALLOW EVALUATION    NAME: Regina Swanson  : 1945  MRN: [id number]    ADMISSION DATE: 2022  ADMITTING DIAGNOSIS: has Sigmoid volvulus ([facility] [address] Abdominal pain; and ARF (acute renal failure) ([facility] [address] on their problem list.  ONSET DATE: 2022    Recent Chest Xray/CT of Chest: 2022  Impression   No radiographic evidence of an acute cardiopulmonary process. Head CT 2022  Impression   No acute intracranial abnormality. MRI of head: pending    2022: History Of Present Illness:    64 y.o. male who presented to [facility] [place] with increasing slurring of speech over the last 2 days. Patient has had a history of previous stroke in 2015 and has some baseline dysarthria but son felt that it was slightly worse. He was noted to be more weak and has not been eating and drinking much and was brought here to the hospital for evaluation. Patient denies focal weakness. He does have some thickened speech. He denies fevers chills chest pain or abdominal pain. Date of Eval: 2022  Evaluating Therapist: Alondra Armstrong MS CCC/SLP [id number]  Speech Language Pathologist      Current Diet level:  Current Diet : Regular      Primary Complaint  Patient Complaint: Nsg reports pt states he only eats 2 meals per day, but today pt did not order lunch or dinner. Pt did allow nsg to set him up with a snack. Pain:  Pain Assessment  Pain Assessment: 0-10  Pain Level: 3; hip; nsg aware      Reason for Referral  Regina Swanson was referred for a bedside swallow evaluation to assess the efficiency of his swallow function, identify signs and symptoms of aspiration and make recommendations regarding safe dietary consistencies, effective compensatory strategies, and safe eating environment.     Impression  Dysphagia Diagnosis: Mild to moderate oral stage dysphagia    Dysphagia Impression :   Pt with no clinical signs of aspiration or penetration. Oral phase deficits appears related to pt being edentulous with no dentures. Pt appears to safely tolerate regular solids, but with very slow rate. Pt with throat clear x 1 of unknown etiology; ST will monitor. Pt may benefit from intermittent assist with feeding. Recommend downgrade to soft and bite sized with pt being encouraged to order a very soft item with every meal to allow easy and adequate nutrition. Pt requires encouragement to order food this date and states he did not like his breakfast.  Pt advised that he can order additonal items as needed and that the main goal is that he eat something. ST to continue to follow to confirm findings this date. Dysphagia Outcome Severity Scale: Level 5: Mild dysphagia- Distant supervision. May need one diet consistency restricted     Treatment Plan  Requires SLP Intervention: Yes  Duration of Treatment: 3-5x/ week     Recommended Diet and Intervention  Solids: IDDSI 6 Soft and Bite Sized Solids; Liquids: IDDSI 0 Thin Liquids;  Meds: Meds whole with thin liquids     Recommendations: Dysphagia treatment  Therapeutic Interventions: Diet tolerance monitoring    Compensatory Swallowing Strategies  Compensatory Swallowing Strategies : Eat/Feed slowly; Alternate solids and liquids;Upright as possible for all oral intake;Small bites/sips; Set up assist;Assist feed    Treatment/Goals  Short-term Goals  Timeframe for Short-term Goals: 7-10 days  Dysphagia Goals: The patient will tolerate recommended diet without observed clinical signs of aspiration    General  Chart Reviewed: Yes  Behavior/Cognition: Alert; Cooperative  O2 Device: None (Room air)  Communication Observation: Functional  Follows Directions: Simple  Dentition: Edentulous (no dentures at home)  Patient Positioning: Upright in bed  Baseline Vocal Quality: Normal  Volitional Cough: Strong  Volitional Swallow: Delayed  Prior Dysphagia History: None noted per chart    Vision/Hearing  Vision  Vision: Impaired  Hearing  Hearing: Within functional limits    Oral Motor Deficits  Oral Hygiene: Moist, Clean  Labial ROM: decreased slightly on R  Labial Symmetry: WFL  Labial Sensation:WFL  Labial Strength:WFL  Labial Coordination:WFL  Lingual ROM:WFL  Lingual Symmetry:WFL  Lingual Sensation:WFL  Lingual Strength:WFL  Lingual Coordination:WFL  Velum:elevates B  Mandible:WFL  Gag: NT  Baseline Vocal Quality: Clear  Volitional Cough: WFL  Volitional Swallow: delayed     Indicators of Oral and Pharyngeal Phase Dysfunction  Oral Phase:  []WFL []Mild   [x] Moderate  []Severe  []To be assessed   [x]Impaired/Prolonged Mastication:   []Spillage Left:   []Spillage Right:  []Pocketing Left:   []Pocketing Right:   []Decreased Anterior to Posterior Transit:   []Suspected Premature Bolus Loss:   []Lingual/Palatal Residue:   []Other:     Pharyngeal Phase:   [x]WFL []Mild   [] Moderate  []Severe  []To be assessed   []Delayed Swallow:   []Suspected Pharyngeal Pooling:   []Decreased Laryngeal Elevation:   []Absent Swallow:  []Wet Vocal Quality:   []Throat Clearing-Immediate:   []Throat Clearing-Delayed:   []Cough-Immediate:   [x]Cough-Delayed: x 1 with unknown cause  []Change in Vital Signs:  []Suspected Delayed Pharyngeal Clearing:  []Other:          Prognosis  Individuals consulted  Consulted and agree with results and recommendations: Patient;RN;Family member  Family member consulted: Pt's son called during session and pt authorized SLP to d/w him regarding swallowing evaluation. RN Name: [facility] [place]    Education  Patient Education: Review of findings and POC with pt  Patient Education Response: Verbalizes understanding;Needs reinforcement  Safety Devices in place: Yes  Type of devices: Left in bed;Bed alarm in place;Call light within reach    This note is to serve as a discharge report should the pt be discharged prior to next Speech Therapy session.          Therapy Time  SLP Individual Minutes  Time In: 1300  Time Out: 5487  Minutes: MS SERA Hernadez/SLP [id number]  Speech Language Pathologist  9/26/2022 1:37 PM
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
## 2022-09-26 NOTE — PROGRESS NOTES
Physical Therapy  Facility/Department: [facility] PROGRESSIVE CARE  Physical Therapy Initial Assessment    Name: Sabine Noriega  : 1945  MRN: [id number]  Date of Service: 2022    Discharge Recommendations:  Continue to assess pending progress   PT Equipment Recommendations  Equipment Needed: No    Lauren [place]burg Zonia Meyers scored a  on the AM-PAC short mobility form. At this time, unsure of how much assist pt has at home or what his baseline status is therefore will continue to follow and make recommendations as needed. Patient Diagnosis(es): The primary encounter diagnosis was GILL (acute kidney injury) ([facility] Diagnoses of Fall, initial encounter and Altered mental status, unspecified altered mental status type were also pertinent to this visit. Past Medical History:  has a past medical history of Cerebral artery occlusion with cerebral infarction ([facility] [address] Diabetes mellitus ([facility] [address] and Hypertension. Past Surgical History:  has a past surgical history that includes Colonoscopy (N/A, 2021); Colonoscopy (N/A, 2021); and colectomy (N/A, 12/15/2021).     Assessment   Body Structures, Functions, Activity Limitations Requiring Skilled Therapeutic Intervention: Decreased functional mobility   Assessment: Pt is a 65 yo male who was adm to [facility] with increased weakness and slurred speech greater than his usual deficits; unsure of pt's prior level as pt vague and gave conflicting information; pt currently is needing assist of 2 for bed mob and transfers with odalis stedy lift; unsure how much assist pt has at home; recommend continued therapy at discharge if pt is below his usual baseline for mobility tasks  Therapy Prognosis: Fair  Decision Making: Medium Complexity  Barriers to Learning: decreased insight into safety and deficits  Requires PT Follow-Up: Yes  Activity Tolerance  Activity Tolerance: Patient tolerated evaluation without incident     Plan   Plan  Plan: 3-5 times per week  Current Treatment Recommendations: Functional mobility training, Balance training, Transfer training, Strengthening, Endurance training  Safety Devices  Type of Devices: Call light within reach, Bed alarm in place, Left in bed, Nurse notified     Restrictions  Restrictions/Precautions  Restrictions/Precautions: Fall Risk  Position Activity Restriction  Other position/activity restrictions: flaccid R UE     Subjective   General  Chart Reviewed: Yes  Patient assessed for rehabilitation services?: Yes  Additional Pertinent Hx: per H&P note: \"68 y.o. male who presented to [facility] [place] with increasing slurring of speech over the last 2 days. Patient has had a history of previous stroke in 2015 and has some baseline dysarthria but son felt that it was slightly worse. He was noted to be more weak and has not been eating and drinking much and was brought here to the hospital for evaluation. \"  Response To Previous Treatment: Not applicable  Family / Caregiver Present: No  Follows Commands: Within Functional Limits  Subjective  Subjective: pt pleasant and agreeable to working with therapy; pt was oriented but unsure if information regarding prior level of function is accurate         Social/Functional History  Social/Functional History  Lives With: Friend(s) (pt reports he lives with 2 friends)  Type of Home: House  Home Layout: Two level, Able to Live on Main level with bedroom/bathroom  Home Access: Ramped entrance  Bathroom Shower/Tub: Walk-in shower, Shower chair with back  [employer]: Handicap height  Bathroom Equipment: Grab bars in shower, Grab bars around toilet  Home Equipment: Wheelchair-electric  ADL Assistance: Needs assistance (Aide assists with bathing/dressing)  Ambulation Assistance:  (uses w/c)  Transfer Assistance: Independent  IADL Comments: Sleeps in a hospital bed  Additional Comments: pt vague and giving conflicting information regarding his home situation and if someone assists him  Vision/Hearing  Vision  Vision: Within Functional Limits (for purpose of eval)  Hearing  Hearing: Within functional limits (for purpose of eval)    Cognition   Orientation  Orientation Level: Oriented to person;Disoriented to place;Oriented to time;Disoriented to situation  Cognition  Overall Cognitive Status: Exceptions  Arousal/Alertness: Appropriate responses to stimuli  Safety Judgement: Decreased awareness of need for assistance;Decreased awareness of need for safety  Insights: Decreased awareness of deficits  Initiation: Requires cues for some     Objective   Heart Rate: 75  Heart Rate Source: Monitor  BP: (!) 123/93  BP Location: Left upper arm  BP Method: Automatic  MAP (Calculated): 103  Resp: 17  SpO2: 100 %  O2 Device: None (Room air)              AROM RLE (degrees)  RLE General AROM: AAROM WFL  AROM LLE (degrees)  LLE AROM : WFL  Strength RLE  Comment: approx 2-/5 hip flex, 2/5 knee ext and trace in ankle  Strength LLE  Strength LLE: WFL           Bed mobility  Supine to Sit: Minimal assistance; Moderate assistance (HOB elevated)  Sit to Supine: Moderate assistance;2 Person assistance  Transfers  Sit to Stand: Maximum Assistance;2 Person Assistance (with odalis [place])  Stand to sit: Maximum Assistance; Moderate Assistance;2 Person Assistance (from [address] [place] [address] GLG)        Balance  Comments: CGA/SBA for sitting EOB; difficult to stand upright on stedy and reported pain in his R ankle           OutComes Score                                                  AM-PAC Score  AM-PAC Inpatient Mobility Raw Score : 9 (09/26/22 1241)  AM-PAC Inpatient T-Scale Score : 30.55 (09/26/22 1241)  Mobility Inpatient CMS 0-100% Score: 81.38 (09/26/22 1241)  Mobility Inpatient CMS G-Code Modifier : CM (09/26/22 1241)          Tinneti Score       Goals  Short Term Goals  Time Frame for Short term goals: by discharge  Short term goal 1: Bed mob SBA from bed with HOB elevated  Short term goal 2: SPT from bed->chair mod A  Patient Goals   Patient goals : pt did not state a goal       Education  Patient Education  Education Given To: Patient  Education Provided: Role of Therapy  Education Method: Verbal  Education Outcome: Verbalized understanding;Continued education needed      Therapy Time   Individual Concurrent Group Co-treatment   Time In 1140         Time Out 65         Minutes 40                 BETH MCCLAIN, PT   Electronically signed by BETH MCLCAIN PT on 9/26/2022 at 12:42 PM

## 2022-09-26 NOTE — PLAN OF CARE
Problem: Discharge Planning  Goal: Discharge to home or other facility with appropriate resources  Outcome: Progressing     Problem: Pain  Goal: Verbalizes/displays adequate comfort level or baseline comfort level  Outcome: Progressing     Problem: Skin/Tissue Integrity  Goal: Absence of new skin breakdown  Description: 1. Monitor for areas of redness and/or skin breakdown  2. Assess vascular access sites hourly  3. Every 4-6 hours minimum:  Change oxygen saturation probe site  4. Every 4-6 hours:  If on nasal continuous positive airway pressure, respiratory therapy assess nares and determine need for appliance change or resting period.   Outcome: Progressing     Problem: Safety - Adult  Goal: Free from fall injury  Outcome: Progressing     Problem: Neurosensory - Adult  Goal: Achieves maximal functionality and self care  Outcome: Progressing

## 2022-09-27 LAB
ALBUMIN SERPL-MCNC: 3.2 G/DL (ref 3.4–5)
ANION GAP SERPL CALCULATED.3IONS-SCNC: 11 MMOL/L (ref 3–16)
BASOPHILS ABSOLUTE: 0 K/UL (ref 0–0.2)
BASOPHILS RELATIVE PERCENT: 0.6 %
BUN BLDV-MCNC: 34 MG/DL (ref 7–20)
CALCIUM SERPL-MCNC: 8.5 MG/DL (ref 8.3–10.6)
CHLORIDE BLD-SCNC: 107 MMOL/L (ref 99–110)
CO2: 17 MMOL/L (ref 21–32)
CREAT SERPL-MCNC: 2.2 MG/DL (ref 0.8–1.3)
EOSINOPHILS ABSOLUTE: 0.2 K/UL (ref 0–0.6)
EOSINOPHILS RELATIVE PERCENT: 2.3 %
GFR AFRICAN AMERICAN: 35
GFR NON-AFRICAN AMERICAN: 29
GLUCOSE BLD-MCNC: 151 MG/DL (ref 70–99)
GLUCOSE BLD-MCNC: 159 MG/DL (ref 70–99)
GLUCOSE BLD-MCNC: 163 MG/DL (ref 70–99)
GLUCOSE BLD-MCNC: 166 MG/DL (ref 70–99)
GLUCOSE BLD-MCNC: 168 MG/DL (ref 70–99)
HCT VFR BLD CALC: 27.5 % (ref 40.5–52.5)
HEMOGLOBIN: 9 G/DL (ref 13.5–17.5)
LYMPHOCYTES ABSOLUTE: 1.3 K/UL (ref 1–5.1)
LYMPHOCYTES RELATIVE PERCENT: 17.8 %
MCH RBC QN AUTO: 29.4 PG (ref 26–34)
MCHC RBC AUTO-ENTMCNC: 32.7 G/DL (ref 31–36)
MCV RBC AUTO: 89.8 FL (ref 80–100)
MONOCYTES ABSOLUTE: 0.7 K/UL (ref 0–1.3)
MONOCYTES RELATIVE PERCENT: 9.4 %
NEUTROPHILS ABSOLUTE: 5.1 K/UL (ref 1.7–7.7)
NEUTROPHILS RELATIVE PERCENT: 69.9 %
PDW BLD-RTO: 15.6 % (ref 12.4–15.4)
PERFORMED ON: ABNORMAL
PHOSPHORUS: 3.7 MG/DL (ref 2.5–4.9)
PLATELET # BLD: 161 K/UL (ref 135–450)
PMV BLD AUTO: 9.5 FL (ref 5–10.5)
POTASSIUM SERPL-SCNC: 3.1 MMOL/L (ref 3.5–5.1)
RBC # BLD: 3.06 M/UL (ref 4.2–5.9)
SODIUM BLD-SCNC: 135 MMOL/L (ref 136–145)
WBC # BLD: 7.2 K/UL (ref 4–11)

## 2022-09-27 PROCEDURE — 6370000000 HC RX 637 (ALT 250 FOR IP): Performed by: INTERNAL MEDICINE

## 2022-09-27 PROCEDURE — 80069 RENAL FUNCTION PANEL: CPT

## 2022-09-27 PROCEDURE — 2500000003 HC RX 250 WO HCPCS: Performed by: INTERNAL MEDICINE

## 2022-09-27 PROCEDURE — 2580000003 HC RX 258: Performed by: INTERNAL MEDICINE

## 2022-09-27 PROCEDURE — 92526 ORAL FUNCTION THERAPY: CPT

## 2022-09-27 PROCEDURE — 6370000000 HC RX 637 (ALT 250 FOR IP): Performed by: NURSE PRACTITIONER

## 2022-09-27 PROCEDURE — 94760 N-INVAS EAR/PLS OXIMETRY 1: CPT

## 2022-09-27 PROCEDURE — 36415 COLL VENOUS BLD VENIPUNCTURE: CPT

## 2022-09-27 PROCEDURE — 2060000000 HC ICU INTERMEDIATE R&B

## 2022-09-27 PROCEDURE — 6360000002 HC RX W HCPCS: Performed by: INTERNAL MEDICINE

## 2022-09-27 PROCEDURE — 85025 COMPLETE CBC W/AUTO DIFF WBC: CPT

## 2022-09-27 RX ORDER — POTASSIUM CHLORIDE 7.45 MG/ML
10 INJECTION INTRAVENOUS
Status: COMPLETED | OUTPATIENT
Start: 2022-09-27 | End: 2022-09-27

## 2022-09-27 RX ORDER — CLONIDINE 0.2 MG/24H
1 PATCH, EXTENDED RELEASE TRANSDERMAL WEEKLY
Status: DISCONTINUED | OUTPATIENT
Start: 2022-09-27 | End: 2022-09-28

## 2022-09-27 RX ADMIN — ATORVASTATIN CALCIUM 20 MG: 20 TABLET, FILM COATED ORAL at 20:45

## 2022-09-27 RX ADMIN — APIXABAN 5 MG: 5 TABLET, FILM COATED ORAL at 20:45

## 2022-09-27 RX ADMIN — Medication 10 MEQ: at 20:42

## 2022-09-27 RX ADMIN — SERTRALINE HYDROCHLORIDE 25 MG: 25 TABLET ORAL at 20:46

## 2022-09-27 RX ADMIN — APIXABAN 5 MG: 5 TABLET, FILM COATED ORAL at 09:07

## 2022-09-27 RX ADMIN — Medication 10 MEQ: at 18:50

## 2022-09-27 RX ADMIN — POTASSIUM CITRATE AND CITRIC ACID 10 MEQ: 334; 1100 LIQUID ORAL at 20:46

## 2022-09-27 RX ADMIN — SODIUM CHLORIDE, PRESERVATIVE FREE 10 ML: 5 INJECTION INTRAVENOUS at 11:14

## 2022-09-27 RX ADMIN — Medication: at 12:08

## 2022-09-27 RX ADMIN — ALLOPURINOL 100 MG: 100 TABLET ORAL at 09:06

## 2022-09-27 RX ADMIN — Medication 10 MEQ: at 16:27

## 2022-09-27 RX ADMIN — POTASSIUM CITRATE AND CITRIC ACID 10 MEQ: 334; 1100 LIQUID ORAL at 16:30

## 2022-09-27 RX ADMIN — Medication 10 MEQ: at 17:41
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
## 2022-09-27 NOTE — CARE COORDINATION
Discharge Planning:   PT/OT: skilled nursing facility   Discussed with patient. Provide SNF list.   The Plan for Transition of Care is related to the following treatment goals: SNF     The Patient was provided with a choice of provider and agrees   with the discharge plan. [x] Yes [] No    Freedom of choice list was provided with basic dialogue that supports the patient's individualized plan of care/goals, treatment preferences and shares the quality data associated with the providers. [x] Yes [] No    Patient chose:   [facility]. Referrals sent via [employer]. Will follow up with admissions. ADITYA Fowler, DAWSON, Social Work/Case Management   [phone]  Electronically signed by ADITYA Fowler LSW on 9/27/2022 at 11:59 AM    [facility]: No beds available. Electronically signed by ADITYA Fowler LSW on 9/27/2022 at 2:56 PM    Spoke to Katrin Ramos at [facility], confirmed they can accept for SNF placement at discharge.    Electronically signed by ADITYA Fowler LSW on 9/27/2022 at 3:54 PM

## 2022-09-27 NOTE — CARE COORDINATION
09/27/22 1611   IMM Letter   IMM Letter given to Patient/Family/Significant other/Guardian/POA/by: Provided to patient by Watson Castleman, MSW, DAWSON. Education provided to patient, patient reported no questions and verbalized understanding. Patient aware of 4 hours allotted time to determine if they choose to pursue Medicare appeal process.    IMM Letter date given: 09/27/22   IMM Letter time given: 0946

## 2022-09-27 NOTE — PROGRESS NOTES
Speech Language Pathology  [facility]   Speech Therapy  Daily Dysphagia Treatment Note        Lynnette Clemente  AGE: 64 y.o. GENDER: male  : 1945  [id number]  EPISODE DATE:  2022  ADMITTING DIAGNOSIS: The primary encounter diagnosis was GILL (acute kidney injury) ([facility] [address] Diagnoses of Fall, initial encounter and Altered mental status, unspecified altered mental status type were also pertinent to this visit. has Sigmoid volvulus ([facility] [address] Abdominal pain; and ARF (acute renal failure) ([facility]) on their problem list.   has a past medical history of Cerebral artery occlusion with cerebral infarction ([facility] [address] Diabetes mellitus ([facility] [address] and Hypertension. No Known Allergies  ONSET DATE: 2022      Treatment Diagnosis: Dysphagia       Chart review:    History Of Present Illness:      64 y.o. male who presented to [facility] [place] with increasing slurring of speech over the last 2 days. Patient has had a history of previous stroke in 2015 and has some baseline dysarthria but son felt that it was slightly worse. He was noted to be more weak and has not been eating and drinking much and was brought here to the hospital for evaluation. Patient denies focal weakness. He does have some thickened speech. He denies fevers chills chest pain or abdominal pain. Recent Chest Xray/CT of Chest: 2022  Impression   No radiographic evidence of an acute cardiopulmonary process. Head CT 2022  Impression   No acute intracranial abnormality. 2022 MRI Brain \"preliminary\" results only in chart as of 2022 1418 pm    Subjective:     Current diet   Dysphagia soft/bite size with thin liquids  Meds with H20 per chart     Comments regarding tolerating Current Diet:   Pt denies problems  No documented recent problems with current diet      Objective:     Pain : denies       Cognitive/Behavior : Pt awaken in bed with lunch tray in front of him.  Pt was reclined and having difficulty with self feeding. SLP re-positioned pt to midline and upright in bed ~80 degrees upright with improved self feeding . Ongoing ed in rationale of upright positioning for meal. Pt was verbally responsive but limited to 1-4 word utterances. Pt on room air        Positioning    SLP re-positioned pt to midline and upright in bed ~80 degrees upright with improved self feeding . PO Trials: Thin Liquids: via straw: No anterior loss and no overt clinical s/s of aspiration or pt complaints  Nectar thick liquids: N/A  Honey Thick liquids: N/A  Puree:  No anterior loss and no overt clinical s/s of aspiration or pt complaints  Soft food ( fruit and meat): prolonged mastication with meat consistencies with reduced bolus formation/cohesion  Pt insightful to oral pocketing and at times required up to 4 clearance swallows. Pt had an easier time with soft fruit and soft cohesive food item. Pt had on episode of delayed cough when taking thin liquids to clear oral meat residue. Regular food: N/A as pt appears most optimal with soft/bite size food consistencies    Dysphagia Tx:   Direct Dysphagia tx: refer to therapy po trials above  Dysphagia ex: trial of bolus formation/cohesion ex; lingual rom for lingual sweep of any oral residue. Training in compensatory strategies: ongoing pt ed in rationale for upright positioning for meals; clearance swallow between bites to clear any oral residue; dysphagia s/s ; aspiration s/s and rationale of SLP therapy f/u  Pt response to ex/training: ongoing ed warranted    Goals:   Dysphagia Goals: The patient will tolerate dysphagia soft/bite size food and thin liquid consistency(ies)  diet without observed clinical signs of aspiration: continue/maintain    Assessment:   Impressions:   Dysphagia Diagnosis: Oropharyngeal dysphagia characterized by prolonged , at times incomplete , mastication; disorganized bolus formation/cohesion; delayed initiation of swallow.    Pt appeared to tolerate isolated thin liquids; puree; soft/cohesive foods  Pt with variable rate Versie Pitcher of mastication soft meat consistencies and variable rate/ease in bolus manipulation/control resulting in oral residue  Pt with clinical s/s with concern for penetration/aspiration when taking thin liquids as a liquid wash to clear meat residue  Recommend continue external assist in set up for meals and monitor of current po diet soft/bite size with thin liquid food consistencies. Concern for meat consistencies and bolus prep/control. SLP therapy f/u x 3- 5 times a week. Potential need for SLP evaluation in view of H&P documented concerns      Diet Recommendations:    Dysphagia soft/bite size food consistencies  Thin liquids  Close monitor of meds with H20 ; may need with puree      Strategies:   Compensatory Swallowing Strategies : Eat/Feed slowly, Alternate solids and liquids, Upright as possible for all oral intake, Small bites/sips, Set up assist, Assist feed    Education:  Consulted and agree with results and recommendations: Patient, RN, Family member  Patient Education: Review of findings and POC with pt  Patient Education Response: Verbalizes understanding, Needs reinforcement    Prognosis:   Good guarded medical DX; medical history co-morbidities    Plan:     Continue Dysphagia Therapy:   Interventions: Therapeutic Interventions: Diet tolerance monitoring  Duration/Frequency of therapy while on unit: Duration/Frequency of Treatment  Duration of Treatment: 3-5x/ week  Discharge Instructions:   Anticipate Yes__X__No__ for further skilled Speech Therapy for Dysphagia at discharge    This note serves as a D/C Summary in the event that this patient is discharged prior to the next therapy session. Coded treatment time: 0  Total treatment time: 21    Electronically signed by  Johnny Tirado. Teagan,MS,CCC,SLP [id number]  Speech and Language Pathologist     on 9/27/2022 at 2:30 PM

## 2022-09-27 NOTE — CONSULTS
[address] [place] [address] Nephrology   [facility]  [phone]  Nephrology Consult Note          Patient ID: Julius Aragon  Referring/ Physician: Trice Haines DO      HPI/Summary:   Julius Aragon is being seen by nephrology for GILL on CKD. This is a 65 yo man with h/o CVA, DM, HTN who presented with weakness and slurred speech. He has some difficulty with speech at baseline. He denies any complaints this AM. No chest pain no SOB, no NVD. He denies any lightheaded or dizziness. He has noticed difficulty emptying his bladder. /90  96% on room air   Atrial fibrillation   No UO recorded. Incontinent. Labs on admission show Cr 2.9   Urine cx NGTd so far   On sodium bicarb 50 meq in D5 at 50 cc/hr    Plan:   - strict IO , bladder scan q shift   - Cr is trending down with fluids. - resume clonidine patch to avoid rebound HTN. On 0.2 at home it seems. - stop IVFs. - start potassium citrate       #GILL on CKD stage 3b  Baseline Cr 1.8-2 it seems   Follows with Dr Marian Matias  Cr at time of consult 2.9   On chronic PPI   Has h/o HTN   Not on diuretics or RAASi  No contrast  Urinalysis with 30 mg /dl albumin  221 WBC, negative urine cx    #Hypokalemia  Possibly due to poor intake. Denies diarrhea. Not on diuretics. Mag 2.6    #Non gap metabolic acidosis. Due to GILL, renal failure. #left adrenal nodule    [facility] would like to thank you for the opportunity to serve this patient. Please call with any questions or concerns. Yolie Adams MD  [facility]  [address]  [place], [address]  Fax: [phone]  Office: [phone]         CC/Reason for consult:   Reason for consult: GILL  Chief Complaint   Patient presents with    Aphasia     Slurred speech starting 2 days ago. LNW 2 days ago. Hx stroke, right arm/leg deficit. No dizziness or pain. Takes thinners           Review of Systems:   Positives Gulshan Kruger.  All other remaining systems are negative. Constitutional:  fever, chills, weakness, weight change, fatigue,      Skin:  rash, pruritus, hair loss, bruising, dry skin, petechiae. Head, Face, Neck   headaches, swelling,  cervical adenopathy. Respiratory: shortness of breath, cough, or wheezing  Cardiovascular: chest pain, palpitations, dizzy, edema  Gastrointestinal: nausea, vomiting, diarrhea, constipation,belly pain    Yellow skin, blood in stool  Musculoskeletal:  back pain, muscle weakness, gait problems,       joint pain or swelling. Genitourinary:  dysuria, poor urine flow, flank pain, blood in urine  Neurologic:  vertigo, TIA'S, syncope, seizures, focal weakness  Psychosocial:  insomnia, anxiety, or depression. Additional positive findings: -     PMH/SH/FH:    Medical Hx: reviewed and updated as appropriate  Past Medical History:   Diagnosis Date    Cerebral artery occlusion with cerebral infarction ([facility] [address]     Di[facility]     Hypertension          Surgical Hx: reviewed and updated as appropriate   has a past surgical history that includes Colonoscopy (N/A, 11/13/2021); Colonoscopy (N/A, 12/12/2021); and colectomy (N/A, 12/15/2021). Social Hx: reviewed and updated as appropriate  Social History     Tobacco Use    Smoking status: Never    Smokeless tobacco: Never   Substance Use Topics    Alcohol use: Not Currently        Family hx: reviewed and updated as appropriate  family history is not on file. Medications:   insulin lispro, 0-4 Units, TID WC  insulin lispro, 0-4 Units, Nightly  allopurinol, 100 mg, Daily  apixaban, 5 mg, BID  atorvastatin, 20 mg, Nightly  sodium chloride flush, 5-40 mL, 2 times per day  dilTIAZem, 120 mg, Daily  sertraline, 25 mg, Nightly       Patient has no known allergies.     Allergies:   No Known Allergies      Physical Exam/Objective:   Vitals:    09/27/22 1145   BP: (!) 151/90   Pulse: 76   Resp: 18   Temp: 97.4 °F (36.3 °C)   SpO2: 96%       Intake/Output Summary (Last 24 hours) at 9/27/2022 1410  Last data filed at 9/27/2022 1009  Gross per 24 hour   Intake 600 ml   Output --   Net 600 ml         General appearance:  in no acute distress, comfortable, communicative, awake and alert. HEENT: no icterus, EOM intact, trachea midline. Neck : no masses, appears symmetrical and no JVD appreciated. Respiratory: Respiratory effort normal, bilateral equal chest rise. No wheeze, no crackles   Cardiovascular: Ausculation shows RRR and  no edema   Abdomen: abdomen is soft, non distended, no masses, no pain with palpation. Musculoskeletal:  no joint swelling  Skin: no rashes, no induration, no tightening, no jaundice   Neuro:   Follows commands, moves all extremities spontaneously       Data:   CBC:   Recent Labs     09/26/22  0959 09/27/22  0444   WBC 7.2 7.2   HGB 10.0* 9.0*   HCT 30.4* 27.5*    161     BMP:    Recent Labs     09/25/22  0609 09/26/22  0959 09/27/22  0444    137 135*   K 3.2* 3.5 3.1*   * 110 107   CO2 11* 16* 17*   BUN 29* 32* 34*   CREATININE 2.1* 2.3* 2.2*   GLUCOSE 66* 124* 159*   MG 1.40* 2.30  --    PHOS  --   --  3.7

## 2022-09-27 NOTE — PROGRESS NOTES
Hospitalist Progress Note    Patient:  Kaila Tam  Unit/Bed:N1U-9344/5129-01   YOB: 1945       MRN: [id number] Acct: [de-identified]  PCP: OBI Torres CNP    Date of Admission: 9/24/2022  --------------------------    Chief Complaint:     Worsening speech disturbances and weakness    Subjective: Pt S/E. No acute events. Diet: ADULT DIET; Dysphagia - Soft and Bite Sized    OBJECTIVE     Exam:  BP (!) 151/90   Pulse 76   Temp 97.4 °F (36.3 °C) (Oral)   Resp 18   Ht 5' 7\" (1.702 m)   Wt 199 lb 8.3 oz (90.5 kg)   SpO2 96%   BMI 31.25 kg/m²        Gen: Not in distress. Alert. Head: Normocephalic. Atraumatic. Eyes: Conjunctivae/corneas clear. ENT: Oral mucosa moist  Neck: No JVD. No obvious thyromegaly. CVS: Nml S1S2, no murmur  , RRR  Pulmomary: Clear bilaterally. No crackles. No wheezes. Gastrointestinal: Soft, non tender, non distend, . Musculoskeletal: No edema. Warm  Neuro: Positive for mild dysarthria, dense right-sided hemiplegia, no new focal deficits   Psychiatry: Appropriate affect. Not agitated.         Medications:  Reviewed    Infusion Medications    dextrose      sodium chloride       Scheduled Medications    cloNIDine  1 patch TransDERmal Weekly    citric acid-potassium citrate  10 mEq Oral TID    potassium chloride  10 mEq IntraVENous Q1H    insulin lispro  0-4 Units SubCUTAneous TID     insulin lispro  0-4 Units SubCUTAneous Nightly    allopurinol  100 mg Oral Daily    apixaban  5 mg Oral BID    atorvastatin  20 mg Oral Nightly    sodium chloride flush  5-40 mL IntraVENous 2 times per day    dilTIAZem  120 mg Oral Daily    sertraline  25 mg Oral Nightly     PRN Meds: glucose, dextrose bolus **OR** dextrose bolus, glucagon (rDNA), dextrose, sodium chloride flush, sodium chloride, ondansetron **OR** ondansetron, acetaminophen **OR** acetaminophen      Intake/Output Summary (Last 24 hours) at 9/27/2022 1552  Last data filed at 9/27/2022 1009  Gross per 24 hour   Intake 600 ml   Output --   Net 600 ml             Labs:   Recent Labs     09/26/22  0959 09/27/22  0444   WBC 7.2 7.2   HGB 10.0* 9.0*   HCT 30.4* 27.5*    161     Recent Labs     09/25/22  0609 09/26/22  0959 09/27/22  0444    137 135*   K 3.2* 3.5 3.1*   * 110 107   CO2 11* 16* 17*   BUN 29* 32* 34*   CREATININE 2.1* 2.3* 2.2*   CALCIUM 7.2* 8.7 8.5   PHOS  --   --  3.7     Recent Labs     09/25/22  0609   AST 9*   ALT <5*   BILITOT <0.2   ALKPHOS 115     No results for input(s): INR in the last 72 hours. No results for input(s): Wayna Khat in the last 72 hours. Urinalysis:      Lab Results   Component Value Date/Time    NITRU Negative 09/24/2022 04:27 PM    WBCUA 21 09/24/2022 04:27 PM    BACTERIA 1+ 09/24/2022 04:27 PM    RBCUA 1 09/24/2022 04:27 PM    BLOODU SMALL 09/24/2022 04:27 PM    SPECGRAV 1.012 09/24/2022 04:27 PM    GLUCOSEU Negative 09/24/2022 04:27 PM       Radiology:  MRI BRAIN WO CONTRAST   Preliminary Result   Cerebral atrophy. Severe chronic small vessel ischemic changes. Remote   lacunar infarcts in the left occipital lobe, left periventricular white   matter and basal ganglia, and central leigh. Wallerian degeneration on the   left. No areas of restricted diffusion to suggest an acute ischemic event. XR CHEST PORTABLE   Final Result   No radiographic evidence of an acute cardiopulmonary process. CT head without contrast   Final Result   No acute intracranial abnormality. Hospital Course:     Jacques Pinedo is a 64 y.o. male hospitalized on 9/24/2022   with history of CVA with residual right-sided weakness and slurred speech presented to the ED noted to have speech disturbances increased weakness. Patient was found to have GILL.     Assessment/plan:     Worsening weakness/speech disturbances and residual right-sided hemiplegia  -New stroke versus stroke recrudescence  - ct head without acute pathology  - MRI of the brain was nonacute. Multiple previous infarcts    Acute on chronic CKD  -Creatinine 2.2 today. Baseline approximately 1.6-1.8.  -Consulted nephrology    Pseudo hypocalcemia  No treatment necessary    Normocytic anemia  - hgb stable at bseline  Monitor hemoglobin    Gastroenteritis  Report diarrhea improving  -C. difficile is negative    Atrial fibrillation  Continue Cardizem and Eliquis    Code Status: Full Code     DVT prophylaxis: Patient on Eliquis     Disposition: dc when ok with nephrology     I discussed my thought processes at length with patient/family and patient understood.  Question and concerns  Addressed      Discussed with RN      Electronically signed by Trice Haines DO on 9/27/2022 at 3:52 PM

## 2022-09-28 VITALS
HEIGHT: 67 IN | WEIGHT: 200.4 LBS | RESPIRATION RATE: 16 BRPM | DIASTOLIC BLOOD PRESSURE: 62 MMHG | TEMPERATURE: 97.6 F | SYSTOLIC BLOOD PRESSURE: 144 MMHG | BODY MASS INDEX: 31.45 KG/M2 | OXYGEN SATURATION: 99 % | HEART RATE: 54 BPM

## 2022-09-28 LAB
ALBUMIN SERPL-MCNC: 3.2 G/DL (ref 3.4–5)
ANION GAP SERPL CALCULATED.3IONS-SCNC: 9 MMOL/L (ref 3–16)
BASOPHILS ABSOLUTE: 0 K/UL (ref 0–0.2)
BASOPHILS RELATIVE PERCENT: 0.6 %
BUN BLDV-MCNC: 27 MG/DL (ref 7–20)
CALCIUM SERPL-MCNC: 8.1 MG/DL (ref 8.3–10.6)
CHLORIDE BLD-SCNC: 109 MMOL/L (ref 99–110)
CO2: 18 MMOL/L (ref 21–32)
CREAT SERPL-MCNC: 1.9 MG/DL (ref 0.8–1.3)
EKG ATRIAL RATE: 51 BPM
EKG DIAGNOSIS: NORMAL
EKG P AXIS: 51 DEGREES
EKG P-R INTERVAL: 220 MS
EKG Q-T INTERVAL: 474 MS
EKG QRS DURATION: 108 MS
EKG QTC CALCULATION (BAZETT): 436 MS
EKG R AXIS: -17 DEGREES
EKG T AXIS: 33 DEGREES
EKG VENTRICULAR RATE: 51 BPM
EOSINOPHILS ABSOLUTE: 0.3 K/UL (ref 0–0.6)
EOSINOPHILS RELATIVE PERCENT: 3.8 %
GFR AFRICAN AMERICAN: 42
GFR NON-AFRICAN AMERICAN: 34
GLUCOSE BLD-MCNC: 106 MG/DL (ref 70–99)
GLUCOSE BLD-MCNC: 118 MG/DL (ref 70–99)
GLUCOSE BLD-MCNC: 157 MG/DL (ref 70–99)
GLUCOSE BLD-MCNC: 183 MG/DL (ref 70–99)
GLUCOSE BLD-MCNC: 196 MG/DL (ref 70–99)
HCT VFR BLD CALC: 25.8 % (ref 40.5–52.5)
HEMOGLOBIN: 8.6 G/DL (ref 13.5–17.5)
LYMPHOCYTES ABSOLUTE: 1.5 K/UL (ref 1–5.1)
LYMPHOCYTES RELATIVE PERCENT: 20 %
MAGNESIUM: 1.8 MG/DL (ref 1.8–2.4)
MCH RBC QN AUTO: 30 PG (ref 26–34)
MCHC RBC AUTO-ENTMCNC: 33.6 G/DL (ref 31–36)
MCV RBC AUTO: 89.3 FL (ref 80–100)
MONOCYTES ABSOLUTE: 0.6 K/UL (ref 0–1.3)
MONOCYTES RELATIVE PERCENT: 8.7 %
NEUTROPHILS ABSOLUTE: 5 K/UL (ref 1.7–7.7)
NEUTROPHILS RELATIVE PERCENT: 66.9 %
PDW BLD-RTO: 15.7 % (ref 12.4–15.4)
PERFORMED ON: ABNORMAL
PHOSPHORUS: 2.8 MG/DL (ref 2.5–4.9)
PLATELET # BLD: 165 K/UL (ref 135–450)
PMV BLD AUTO: 9.5 FL (ref 5–10.5)
POTASSIUM SERPL-SCNC: 3.1 MMOL/L (ref 3.5–5.1)
RBC # BLD: 2.88 M/UL (ref 4.2–5.9)
SODIUM BLD-SCNC: 136 MMOL/L (ref 136–145)
WBC # BLD: 7.4 K/UL (ref 4–11)

## 2022-09-28 PROCEDURE — 2580000003 HC RX 258: Performed by: INTERNAL MEDICINE

## 2022-09-28 PROCEDURE — 80069 RENAL FUNCTION PANEL: CPT

## 2022-09-28 PROCEDURE — 97530 THERAPEUTIC ACTIVITIES: CPT | Performed by: PHYSICAL THERAPIST

## 2022-09-28 PROCEDURE — 83735 ASSAY OF MAGNESIUM: CPT

## 2022-09-28 PROCEDURE — 85025 COMPLETE CBC W/AUTO DIFF WBC: CPT

## 2022-09-28 PROCEDURE — 6370000000 HC RX 637 (ALT 250 FOR IP): Performed by: INTERNAL MEDICINE

## 2022-09-28 PROCEDURE — 93010 ELECTROCARDIOGRAM REPORT: CPT | Performed by: INTERNAL MEDICINE

## 2022-09-28 PROCEDURE — 36415 COLL VENOUS BLD VENIPUNCTURE: CPT

## 2022-09-28 PROCEDURE — 93005 ELECTROCARDIOGRAM TRACING: CPT | Performed by: FAMILY MEDICINE

## 2022-09-28 PROCEDURE — 97530 THERAPEUTIC ACTIVITIES: CPT

## 2022-09-28 PROCEDURE — 6370000000 HC RX 637 (ALT 250 FOR IP): Performed by: NURSE PRACTITIONER

## 2022-09-28 PROCEDURE — 6370000000 HC RX 637 (ALT 250 FOR IP): Performed by: FAMILY MEDICINE

## 2022-09-28 PROCEDURE — 94760 N-INVAS EAR/PLS OXIMETRY 1: CPT

## 2022-09-28 RX ORDER — POTASSIUM CHLORIDE 750 MG/1
40 TABLET, FILM COATED, EXTENDED RELEASE ORAL ONCE
Status: COMPLETED | OUTPATIENT
Start: 2022-09-28 | End: 2022-09-28

## 2022-09-28 RX ORDER — CLONIDINE 0.1 MG/24H
1 PATCH, EXTENDED RELEASE TRANSDERMAL WEEKLY
Status: DISCONTINUED | OUTPATIENT
Start: 2022-09-28 | End: 2022-09-29 | Stop reason: HOSPADM

## 2022-09-28 RX ORDER — CLONIDINE 0.1 MG/24H
1 PATCH, EXTENDED RELEASE TRANSDERMAL WEEKLY
Qty: 4 PATCH | Refills: 0 | Status: SHIPPED | OUTPATIENT
Start: 2022-09-28

## 2022-09-28 RX ADMIN — ALLOPURINOL 100 MG: 100 TABLET ORAL at 10:15

## 2022-09-28 RX ADMIN — APIXABAN 5 MG: 5 TABLET, FILM COATED ORAL at 10:15

## 2022-09-28 RX ADMIN — POTASSIUM CHLORIDE 40 MEQ: 750 TABLET, FILM COATED, EXTENDED RELEASE ORAL at 12:35

## 2022-09-28 RX ADMIN — SERTRALINE HYDROCHLORIDE 25 MG: 25 TABLET ORAL at 20:03

## 2022-09-28 RX ADMIN — APIXABAN 5 MG: 5 TABLET, FILM COATED ORAL at 20:03

## 2022-09-28 RX ADMIN — POTASSIUM CITRATE AND CITRIC ACID 10 MEQ: 334; 1100 LIQUID ORAL at 15:42

## 2022-09-28 RX ADMIN — POTASSIUM CITRATE AND CITRIC ACID 10 MEQ: 334; 1100 LIQUID ORAL at 20:06

## 2022-09-28 RX ADMIN — ATORVASTATIN CALCIUM 20 MG: 20 TABLET, FILM COATED ORAL at 20:03

## 2022-09-28 RX ADMIN — SODIUM CHLORIDE, PRESERVATIVE FREE 5 ML: 5 INJECTION INTRAVENOUS at 10:07

## 2022-09-28 NOTE — PROGRESS NOTES
Occupational Therapy  Facility/Department: [facility] 5 PROGRESSIVE CARE  Occupational Therapy Daily Treatment Note    Name: Ruddy Bojorquez YOB: 1945  MRN: [id number]  Date of Service: 2022    Discharge Recommendations:  Patient would benefit from continued therapy after discharge, 3-5 sessions per week          Patient Diagnosis(es): The primary encounter diagnosis was GILL (acute kidney injury) ([facility] Diagnoses of Fall, initial encounter and Altered mental status, unspecified altered mental status type were also pertinent to this visit. Past Medical History:  has a past medical history of Cerebral artery occlusion with cerebral infarction ([facility] Diabetes mellitus ([facility] and Hypertension. Past Surgical History:  has a past surgical history that includes Colonoscopy (N/A, 2021); Colonoscopy (N/A, 2021); and colectomy (N/A, 12/15/2021). Assessment   Performance deficits / Impairments: Decreased functional mobility ; Decreased endurance;Decreased strength;Decreased safe awareness;Decreased ADL status; Decreased high-level IADLs;Decreased balance;Decreased cognition;Decreased ROM  Assessment: 63 yo male who was adm to hosp 2022 with increased weakness and slurred speech greater than his usual deficits. Unsure pf PLOF as pt vague or providing conflicting information. Today, pt with nonfunctional RUE. Pt required max assist to scoot to the edge and complete bed mobility with CGA. Pt in recliner with pillow placed under his RUE to keep abducted from his trunk. Pt will benefit from skilled therapy at this time.   Prognosis: Good  Activity Tolerance  Activity Tolerance: Patient Tolerated treatment well        Plan   Plan  Times per Week: 3-5  Current Treatment Recommendations: Strengthening, Balance training, Functional mobility training, Endurance training, Self-Care / ADL, Safety education & training     Restrictions  Restrictions/Precautions  Restrictions/Precautions: Fall Risk  Position Activity Restriction  Other position/activity restrictions: flaccid R UE    Subjective   General  Chart Reviewed: Yes, Progress Notes  Patient assessed for rehabilitation services?: Yes  Additional Pertinent Hx: Per H&P: \"64year-old male admitted to the hospital with weakness and slurred speech. Patient has previous history of large stroke in 2015 with residual dysarthria. \" CT head negative. MRI pending  Family / Caregiver Present: No  Referring Practitioner: Dr. Vivien Alcocer  Diagnosis: CVA r/o  Subjective  Subjective: Pt seen bedside and agreed to therapy. Pt seen as a cotx with PT. General Comment  Comments: Per RN ok for therapy     Social/Functional History  Social/Functional History  Lives With: Friend(s) (pt reports he lives with 2 friends)  Type of Home: House  Home Layout: Two level, Able to Live on Main level with bedroom/bathroom  Home Access: Ramped entrance  Bathroom Shower/Tub: Walk-in shower, Shower chair with back  Bathroom Toilet: Handicap height  Bathroom Equipment: Grab bars in shower, Grab bars around toilet  Home Equipment: Wheelchair-electric  ADL Assistance: Needs assistance (Aide assists with bathing/dressing)  Ambulation Assistance:  (uses w/c)  Transfer Assistance: Independent  IADL Comments: Sleeps in a hospital bed  Additional Comments: pt vague and giving conflicting information regarding his home situation and if someone assists him       Objective   Heart Rate: (!) 45  Heart Rate Source: Monitor  BP: 116/62  BP Location: Left upper arm  BP Method: Automatic  Patient Position: Semi fowlers  MAP (Calculated): 80  Resp: 13  SpO2: 98 %  O2 Device: None (Room air)          Observation/Palpation  Observation: R shoulder sublux'd ~ 2 fingers width  Safety Devices  Type of Devices: Call light within reach; Chair alarm in place; Left in chair;Gait belt;Nurse notified  Balance  Standing: Impaired (Stood in the stedy for toileting.)  Wheelchair Bed Transfers  Wheelchair/Bed - Technique: [facility])  Equipment Used: Other (recliner)  Level of Asssistance: Moderate assistance;2 Person assistance     ADL  Grooming: Setup  Grooming Skilled Clinical Factors: Washed left hand with setup. Toileting: Dependent/Total  Toileting Skilled Clinical Factors: Pt incontinent of bowel and bladder. Required assist to change depends and for hygiene. Pt stood in the stedy. Activity Tolerance  Activity Tolerance: Patient tolerated treatment well  Bed mobility  Supine to Sit: Contact guard assistance (HOB fully elevated and extra time with verbal cues)  Scooting: Maximal assistance  Transfers  Sit to stand: Moderate assistance;2 Person assistance  Stand to sit: Moderate assistance;2 Person assistance  Transfer Comments: Stood to the stedy. Vision  Vision: Within Functional Limits  Hearing  Hearing: Within functional limits  Cognition  Overall Cognitive Status: Exceptions  Arousal/Alertness: Appropriate responses to stimuli  Safety Judgement: Decreased awareness of need for assistance;Decreased awareness of need for safety  Insights: Decreased awareness of deficits  Initiation: Requires cues for some  Orientation  Orientation Level: Oriented to person;Disoriented to place;Oriented to time;Disoriented to situation                                             AM-PAC Score        AM-[facility] Inpatient Daily Activity Raw Score: 10 (09/28/22 1348)  AM-PAC Inpatient ADL T-Scale Score : 27.31 (09/28/22 1348)  ADL Inpatient CMS 0-100% Score: 74.7 (09/28/22 1348)  ADL Inpatient CMS G-Code Modifier : CL (09/28/22 1348)        Goals  Short Term Goals  Time Frame for Short term goals: Prior to DC:   Short Term Goal 1: Pt will complete bed mobility with min A in prep for transfer  Short Term Goal 2: Pt will tolerate sit <> stand with mod A  Short Term Goal 3: Pt will complete grooming tasks with set up  Short Term Goal 4: Pt will complete rolling R/L for supine ADls with min A  Short Term Goal 5: Pt will complete sit pivot transfer with mod A  Patient Goals   Patient goals : no goal stated       Therapy Time   Individual Concurrent Group Co-treatment   Time In 5976         Time Out 4035         Minutes 30             This note to serve as OT d/c summary if pt is d/c-ed from hospital prior to next OT session.       Ginna Velasco, [address]

## 2022-09-28 NOTE — PROGRESS NOTES
Physician Progress Note      PATIENT:               Supriya Krishnan  CSN #:                  [id number]  :                       1945  ADMIT DATE:       2022 11:04 AM  DISCH DATE:  RESPONDING  PROVIDER #:        Suni Dash DO          QUERY TEXT:    Pt admitted with weakness, slurred speech. Pt noted to have prior CVA. If   possible, please document in progress notes and discharge summary if you are   evaluating and/or treating any of the following: The medical record reflects the following:  Risk Factors: prior cva , giovanni, ckd  Clinical Indicators: Documentation per progress note of Worsening   weakness/speech disturbances and residual right-sided hemiplegia -New stroke   versus stroke recrudescence. MRI of the brain was nonacute. Multiple previous   infarcts. Treatment: PT/OT, neuro checks, eliquis, lipitor, dysphagia diet    Thank you, Johnathon Estrella RN CDS CRCR CLINT [email or web address]  Options provided:  -- Increase in Right sided weakness, slurred speech  is a sequela of prior CVA  -- Other - I will add my own diagnosis  -- Disagree - Not applicable / Not valid  -- Disagree - Clinically unable to determine / Unknown  -- Refer to Clinical Documentation Reviewer    PROVIDER RESPONSE TEXT:    Increase in Right sided weakness ,slurred speech is a sequela of prior CVA. Query created by:  Eliza Estrella on 2022 9:35 AM      Electronically signed by:  Benja Brunner DO 2022 1:13 PM

## 2022-09-28 NOTE — DISCHARGE SUMMARY
intact. Conjunctivae/corneas clear. Neck: Supple, with full range of motion. No jugular venous distention. Trachea midline. Respiratory:  Normal respiratory effort. Clear to auscultation, bilaterally without Rales/Wheezes/Rhonchi. Cardiovascular:  sinus bradycardia, normal S1/S2 without murmurs, rubs or gallops. Abdomen: Soft, non-tender, non-distended with normal bowel sounds. Musculoskeletal:  No clubbing, cyanosis or edema bilaterally. Skin: Skin color, texture, turgor normal.  No rashes or lesions. Neurologic:  Neurovascularly intact without any focal sensory/motor deficits. Cranial nerves: II-XII intact, grossly non-focal.  Psychiatric:  Alert and oriented, tpleasant    Consults:     IP CONSULT TO HOSPITALIST  IP CONSULT TO SOCIAL WORK  IP CONSULT TO NEPHROLOGY    Labs: For convenience and continuity at follow-up the following most recent labs are provided:    Lab Results   Component Value Date/Time    WBC 7.4 09/28/2022 05:27 AM    HGB 8.6 09/28/2022 05:27 AM    HCT 25.8 09/28/2022 05:27 AM    MCV 89.3 09/28/2022 05:27 AM     09/28/2022 05:27 AM     09/28/2022 05:27 AM    K 3.1 09/28/2022 05:27 AM    K 3.2 09/25/2022 06:09 AM     09/28/2022 05:27 AM    CO2 18 09/28/2022 05:27 AM    BUN 27 09/28/2022 05:27 AM    CREATININE 1.9 09/28/2022 05:27 AM    CALCIUM 8.1 09/28/2022 05:27 AM    PHOS 2.8 09/28/2022 05:27 AM    ALKPHOS 115 09/25/2022 06:09 AM    ALT <5 09/25/2022 06:09 AM    AST 9 09/25/2022 06:09 AM    BILITOT <0.2 09/25/2022 06:09 AM    BILIDIR <0.2 12/12/2021 01:13 PM    LABALBU 3.2 09/28/2022 05:27 AM     No results found for: INR    Radiology:  MRI BRAIN WO CONTRAST   Preliminary Result   Cerebral atrophy. Severe chronic small vessel ischemic changes. Remote   lacunar infarcts in the left occipital lobe, left periventricular white   matter and basal ganglia, and central leigh. Wallerian degeneration on the   left.   No areas of restricted diffusion to suggest an acute ischemic event. XR CHEST PORTABLE   Final Result   No radiographic evidence of an acute cardiopulmonary process. CT head without contrast   Final Result   No acute intracranial abnormality. Discharge Medications:   Current Discharge Medication List        START taking these medications    Details   cloNIDine (CATAPRES) 0.1 MG/24HR PTWK Place 1 patch onto the skin once a week  Qty: 4 patch, Refills: 0      citric acid-potassium citrate (POLYCITRA) 1100-334 MG/5ML solution Take 5 mLs by mouth 3 times daily  Qty: 473 mL, Refills: 0           Current Discharge Medication List        Current Discharge Medication List        CONTINUE these medications which have NOT CHANGED    Details   sertraline (ZOLOFT) 25 MG tablet Take 25 mg by mouth every evening      docusate sodium (COLACE) 100 MG capsule Take 100 mg by mouth daily      dilTIAZem (DILACOR XR) 120 MG extended release capsule Take 120 mg by mouth daily      ferrous sulfate (FE TABS 325) 325 (65 Fe) MG EC tablet Take 325 mg by mouth 2 times daily      bisacodyl (DULCOLAX) 5 MG EC tablet Take 10 mg by mouth daily as needed for Constipation      allopurinol (ZYLOPRIM) 100 MG tablet Take 100 mg by mouth daily      apixaban (ELIQUIS) 5 MG TABS tablet Take 5 mg by mouth 2 times daily      atorvastatin (LIPITOR) 20 MG tablet Take 20 mg by mouth every evening      Cholecalciferol 50 MCG (2000 UT) TABS Take 50 mcg by mouth daily           Current Discharge Medication List        STOP taking these medications       cloNIDine (CATAPRES) 0.2 MG/24HR PTWK Comments:   Reason for Stopping:         potassium chloride (KLOR-CON M) 20 MEQ extended release tablet Comments:   Reason for Stopping:         omeprazole (PRILOSEC) 20 MG delayed release capsule Comments:   Reason for Stopping:                Follow-up appointments:  one week    Provider Follow-up:    pcp    Condition at Discharge:  Stable    The patient was seen and examined on day of discharge and this discharge summary is in conjunction with any daily progress note from day of discharge. Time Spent on discharge is 45 minutes  in the examination, evaluation, counseling and review of medications and discharge plan. Signed:    Bre Steele DO   9/28/2022      Thank you OBI Holman CNP for the opportunity to be involved in this patient's care. If you have any questions or concerns please feel free to contact me at [phone].

## 2022-09-28 NOTE — PROGRESS NOTES
Physical Therapy  Facility/Department: [address] PROGRESSIVE CARE  Physical Therapy Treatment Note    Name: Singh Vanegas  : 1945  MRN: [id number]  Date of Service: 2022    Discharge Recommendations:  Patient would benefit from continued therapy after discharge (3-5x/wk)    Singh Vanegas scored a 10/24 on the AM-PAC short mobility form. Current research shows that an AM-PAC score of 17 or less is typically not associated with a discharge to the patient's home setting. Based on the patient's AM-PAC score and their current functional mobility deficits, it is recommended that the patient have 3-5 sessions per week of Physical Therapy at d/c to increase the patient's independence. Please see assessment section for further patient specific details. If patient discharges prior to next session this note will serve as a discharge summary. Please see below for the latest assessment towards goals. Patient Diagnosis(es): The primary encounter diagnosis was GLIL (acute kidney injury) ([facility]). Diagnoses of Fall, initial encounter and Altered mental status, unspecified altered mental status type were also pertinent to this visit. Past Medical History:  has a past medical history of Cerebral artery occlusion with cerebral infarction ([facility]), Diabetes mellitus ([facility]), and Hypertension. Past Surgical History:  has a past surgical history that includes Colonoscopy (N/A, 2021); Colonoscopy (N/A, 2021); and colectomy (N/A, 12/15/2021).     Assessment   Body Structures, Functions, Activity Limitations Requiring Skilled Therapeutic Intervention: Decreased functional mobility   Assessment: pt making some progress in therapy however continues to need assist of 2 for transfers with odalis stedy lift; pt is a high fall risk and not safe to return home at discharge; reccommend continued therapy 3-5x/wk to address deficits to allow pt to regain his max potential  Therapy Prognosis: Fair  Decision Making: Medium Complexity  Barriers to Learning: decreased insight into safety and deficits  Requires PT Follow-Up: Yes  Activity Tolerance  Activity Tolerance: Patient tolerated treatment well     Plan   Plan  Plan: 3-5 times per week  Current Treatment Recommendations: Functional mobility training, Balance training, Transfer training, Strengthening, Endurance training  Safety Devices  Type of Devices: Call light within reach, Chair alarm in place, Left in chair, Gait belt, Nurse notified     Restrictions  Restrictions/Precautions  Restrictions/Precautions: Fall Risk  Position Activity Restriction  Other position/activity restrictions: flaccid R UE     Subjective   General  Chart Reviewed: Yes  Additional Pertinent Hx: per H&P note: \"68 y.o. male who presented to [facility] [place] with increasing slurring of speech over the last 2 days. Patient has had a history of previous stroke in 2015 and has some baseline dysarthria but son felt that it was slightly worse. He was noted to be more weak and has not been eating and drinking much and was brought here to the hospital for evaluation. \"  Response To Previous Treatment: Patient with no complaints from previous session.   Family / Caregiver Present: No  Follows Commands: Within Functional Limits  Subjective  Subjective: pt agreeable to getting up with therapy         Social/Functional History  Social/Functional History  Lives With: Friend(s) (pt reports he lives with 2 friends)  Type of Home: House  Home Layout: Two level, Able to Live on Main level with bedroom/bathroom  Home Access: Ramped entrance  Bathroom Shower/Tub: Walk-in shower, Shower chair with back  [place] Toilet: Handicap height  Bathroom Equipment: Grab bars in shower, Grab bars around toilet  Home Equipment: Wheelchair-electric  ADL Assistance: Needs assistance (Aide assists with bathing/dressing)  Ambulation Assistance:  (uses w/c)  Transfer Assistance: Independent  IADL Comments: Sleeps in a hospital bed  Additional Comments: pt vague and giving conflicting information regarding his home situation and if someone assists him  Vision/Hearing  Vision  Vision: Within Functional Limits  Hearing  Hearing: Within functional limits    Cognition   Orientation  Orientation Level: Oriented to person;Disoriented to place;Oriented to time;Disoriented to situation  Cognition  Overall Cognitive Status: Exceptions  Arousal/Alertness: Appropriate responses to stimuli  Safety Judgement: Decreased awareness of need for assistance;Decreased awareness of need for safety  Insights: Decreased awareness of deficits  Initiation: Requires cues for some     Objective   Heart Rate: (!) 45  Heart Rate Source: Monitor  BP: 116/62  BP Location: Left upper arm  BP Method: Automatic  Patient Position: Semi fowlers  MAP (Calculated): 80  Resp: 13  SpO2: 98 %  O2 Device: None (Room air)     Observation/Palpation  Observation: R shoulder sublux'd ~ 2 fingers width                    Balance  Standing: Impaired (Stood in the stedy for toileting.)  Bed mobility  Supine to Sit: Contact guard assistance (HOB fully elevated and extra time with verbal cues)  Scooting: Maximal assistance  Transfers  Sit to Stand: Moderate Assistance;2 Person Assistance (with odalis stedy)  Stand to sit: Moderate Assistance;2 Person Assistance (with odalis stedy)  Bed to Chair: Dependent/Total (with odalis stedy)        Balance  Comments: CGA/SBA for sitting EOB; difficult to stand upright on stedy, needed mod A for standing up errect enough to get seat of stedy under him       Pt positioned in chair with LEs elevated and all needs in reach;      OutComes Score                                                  AM-PAC Score  AM-PAC Inpatient Mobility Raw Score : 10 (09/28/22 1348)  AM-PAC Inpatient T-Scale Score : 32.29 (09/28/22 1348)  Mobility Inpatient CMS 0-100% Score: 76.75 (09/28/22 1348)  Mobility Inpatient CMS G-Code Modifier : CL (09/28/22 1348)          Tinneti Score Goals  Short Term Goals  Time Frame for Short term goals: by discharge  Short term goal 1: Bed mob SBA from bed with HOB elevated  Short term goal 2: SPT from bed->chair mod A  Patient Goals   Patient goals : pt did not state a goal       Education  Patient Education  Education Given To: Patient  Education Provided Comments: reviewed call light and not getting up without assist  Education Method: Verbal  Education Outcome: Verbalized understanding;Continued education needed      Therapy Time   Individual Concurrent Group Co-treatment   Time In 1310         Time Out 1349         Minutes 39                 BETH MCCLAIN PT   Electronically signed by BETH MCCLAIN PT on 9/28/2022 at 1:50 PM

## 2022-09-28 NOTE — PROGRESS NOTES
[address] [place] [address] Nephrology   [facility]  [phone]  Nephrology Note          Patient ID: Garcia Shannon  Referring/ Physician: Júnior Roland DO      Summary:   Garcia Shannon is being seen by nephrology for GILL on CKD. This is a 63 yo man with h/o CVA, DM, HTN who presented with weakness and slurred speech. He has some difficulty with speech at baseline. He denies any complaints this AM. No chest pain no SOB, no NVD. He denies any lightheaded or dizziness. He has noticed difficulty emptying his bladder. Interval hx:  Seen and examined at bedside  Awake and alert  No trouble urinating no chest pain or shortness of breath no edema    Labs reviewed  Sodium 136  Potassium 3.1 BUN 27 creatinine 1.9 calcium 8.1 bicarb 18    Blood pressure 116/60 298% on room air  Afebrile and heart rate 45\  urine output not recorded      Plan:   - ok with discharge   Send with K citrate 10 meq TID. BP is acceptable   No need for diuretics. Cr is at baseline. Follow up with Dr Natalya Pa      #GILL on CKD stage 3b  Baseline Cr 1.8-2 it seems   Follows with Dr Natalya Pa  Cr at time of consult 2.9   On chronic PPI   Has h/o HTN   Not on diuretics or RAASi  No contrast  Urinalysis with 30 mg /dl albumin  221 WBC, negative urine cx    #Hypokalemia  Possibly due to poor intake. Denies diarrhea. Not on diuretics. Mag 2.6    #Non gap metabolic acidosis. Due to GILL, renal failure. #left adrenal nodule    [facility] would like to thank you for the opportunity to serve this patient. Please call with any questions or concerns. Lynn Patel MD  [facility]  [facility]  [place], [address]  Fax: [phone]  Office: [phone]         CC/Reason for consult:   Reason for consult: GILL  Chief Complaint   Patient presents with    Aphasia     Slurred speech starting 2 days ago. LNW 2 days ago. Hx stroke, right arm/leg deficit. No dizziness or pain.  Takes thinners           Review of Systems:   [place]. All other remaining systems are negative. Constitutional:  fever, chills, weakness, weight change, fatigue,      Skin:  rash, pruritus, hair loss, bruising, dry skin, petechiae. Head, Face, Neck   headaches, swelling,  cervical adenopathy. Respiratory: shortness of breath, cough, or wheezing  Cardiovascular: chest pain, palpitations, dizzy, edema  Gastrointestinal: nausea, vomiting, diarrhea, constipation,belly pain    Yellow skin, blood in stool  Musculoskeletal:  back pain, muscle weakness, gait problems,       joint pain or swelling. Genitourinary:  dysuria, poor urine flow, flank pain, blood in urine  Neurologic:  vertigo, TIA'S, syncope, seizures, focal weakness  Psychosocial:  insomnia, anxiety, or depression. Additional positive findings: -     PMH/SH/FH:    Medical Hx: reviewed and updated as appropriate  Past Medical History:   Diagnosis Date    Cerebral artery occlusion with cerebral infarction ([facility] [address]     Diabetes mellitus ([facility] [address]     Hypertension          Surgical Hx: reviewed and updated as appropriate   has a past surgical history that includes Colonoscopy (N/A, 11/13/2021); Colonoscopy (N/A, 12/12/2021); and colectomy (N/A, 12/15/2021). Social Hx: reviewed and updated as appropriate  Social History     Tobacco Use    Smoking status: Never    Smokeless tobacco: Never   Substance Use Topics    Alcohol use: Not Currently        Family hx: reviewed and updated as appropriate  family history is not on file. Medications:   cloNIDine, 1 patch, Weekly  citric acid-potassium citrate, 10 mEq, TID  insulin lispro, 0-4 Units, TID WC  insulin lispro, 0-4 Units, Nightly  allopurinol, 100 mg, Daily  apixaban, 5 mg, BID  atorvastatin, 20 mg, Nightly  sodium chloride flush, 5-40 mL, 2 times per day  dilTIAZem, 120 mg, Daily  sertraline, 25 mg, Nightly     Patient has no known allergies.     Allergies:   No Known Allergies      Physical Exam/Objective:   Vitals:    09/28/22 1145   BP: 116/62   Pulse: (!) 45   Resp: 13   Temp: 97.3 °F (36.3 °C)   SpO2:        Intake/Output Summary (Last 24 hours) at 9/28/2022 1443  Last data filed at 9/28/2022 0913  Gross per 24 hour   Intake --   Output 225 ml   Net -225 ml         General appearance:  in no acute distress, comfortable, communicative, awake and alert. HEENT: no icterus, EOM intact, trachea midline. Neck : no masses, appears symmetrical and no JVD appreciated. Respiratory: Respiratory effort normal, bilateral equal chest rise. No wheeze, no crackles   Cardiovascular: Ausculation shows RRR and  no edema   Abdomen: abdomen is soft, non distended, no masses, no pain with palpation. Musculoskeletal:  no joint swelling  Skin: no rashes, no induration, no tightening, no jaundice   Neuro:   Follows commands, moves all extremities spontaneously       Data:   CBC:   Recent Labs     09/26/22  0959 09/27/22  0444 09/28/22  0527   WBC 7.2 7.2 7.4   HGB 10.0* 9.0* 8.6*   HCT 30.4* 27.5* 25.8*    161 165     BMP:    Recent Labs     09/26/22  0959 09/27/22  0444 09/28/22  0527 09/28/22  1009    135* 136  --    K 3.5 3.1* 3.1*  --     107 109  --    CO2 16* 17* 18*  --    BUN 32* 34* 27*  --    CREATININE 2.3* 2.2* 1.9*  --    GLUCOSE 124* 159* 106*  --    MG 2.30  --   --  1.80   PHOS  --  3.7 2.8  --

## 2022-09-28 NOTE — CARE COORDINATION
Discharge order noted. [facility] able to accept patient tonight. Transport set up with CMT at 9:15pm.  HENS complete, Covid ordered. Notified patient at bedside. Bedside nurse aware.  Bedside nurse to notify son of transport    #[phone]  Electronically signed by Luisa Sierra RN on 9/28/2022 at 3:31 PM

## 2022-09-28 NOTE — DISCHARGE INSTR - COC
Continuity of Care Form    Patient Name: Ramin Daigle   :  1945  MRN:  [id number]    Admit date:  2022  Discharge date:  ***    Code Status Order: Full Code   Advance Directives:     Admitting Physician:  No admitting provider for patient encounter. PCP: OBI Holman CNP    Discharging Nurse: [facility] Unit/Room#: V8C-0407/8437-33  Discharging Unit Phone Number: ***    Emergency Contact:   Extended Emergency Contact Information  Primary Emergency Contact: Julia Wilkes  Address: [address] Phone: [phone]  Work Phone: [phone]  Mobile Phone: [phone]  Relation: Child  Secondary Emergency Contact: Geo Seymour  Home Phone: [phone]  Relation: Brother/Sister   needed?  No    Past Surgical History:  Past Surgical History:   Procedure Laterality Date    COLECTOMY N/A 12/15/2021    SIGMOID COLECTOMY performed by Pantera Cruz MD at [address] [address] N/A 2021    COLONOSCOPY DIAGNOSTIC performed by Home Ramos MD at [address] [place] [address] N/A 2021    COLONOSCOPY FLEXIBLE W/ DECOMPRESSION performed by Monica Couch MD at [facility] ENDOSCOPY       Immunization History:   Immunization History   Administered Date(s) Administered    COVID-19, J&J, (age 18y+), IM, 0.5 mL 2021    COVID-19, PFIZER PURPLE top, DILUTE for use, (age 15 y+), 30mcg/0.3mL 2021       Active Problems:  Patient Active Problem List   Diagnosis Code    Sigmoid volvulus ([facility] [address] K56.2    Abdominal pain R10.9    ARF (acute renal failure) ([address] N17.9       Isolation/Infection:   Isolation            No Isolation          Patient Infection Status       Infection Onset Added Last Indicated Last Indicated By Review Planned Expiration Resolved Resolved By    None active    Resolved    C-diff Rule Out 22 Clostridium Difficile Toxin/Antigen (Ordered)   22 Rule-Out Test Resulted    COVID-19 (Rule Out) 11/13/21 11/13/21 11/13/21 COVID-19, Rapid (Ordered)   11/13/21 Rule-Out Test Resulted            Nurse Assessment:  Last Vital Signs: /62   Pulse (!) 45   Temp 97.3 °F (36.3 °C) (Oral)   Resp 13   Ht 5' 7\" (1.702 m)   Wt 200 lb 6.4 oz (90.9 kg)   SpO2 98%   BMI 31.39 kg/m²     Last documented pain score (0-10 scale): Pain Level: 3  Last Weight:   Wt Readings from Last 1 Encounters:   09/28/22 200 lb 6.4 oz (90.9 kg)     Mental Status:  oriented and alert    IV Access:  - None    Nursing Mobility/ADLs:  Walking   Dependent  Transfer  Dependent  Bathing  Dependent  Dressing  Dependent  Toileting  Dependent  Feeding  Dependent  Med Admin  Assisted  Med Delivery   whole    Wound Care Documentation and Therapy:  Incision 12/15/21 Abdomen Medial; Upper (Active)   Number of days: 286        Elimination:  Continence: Bowel: No  Bladder: No  Urinary Catheter: None   Colostomy/Ileostomy/Ileal Conduit: No       Date of Last BM: 9/29/2022- pt having diarrhea     Intake/Output Summary (Last 24 hours) at 9/28/2022 1345  Last data filed at 9/28/2022 0913  Gross per 24 hour   Intake --   Output 225 ml   Net -225 ml     I/O last 3 completed shifts: In: 120 [P.O.:120]  Out: -     Safety Concerns: At Risk for Falls    Impairments/Disabilities:      Speech slurred     Nutrition Therapy:  Current Nutrition Therapy:   - Oral Diet:  General    Routes of Feeding: Oral  Liquids: Thin Liquids  Daily Fluid Restriction: no  Last Modified Barium Swallow with Video (Video Swallowing Test): not done    Treatments at the Time of Hospital Discharge:   Respiratory Treatments: ***  Oxygen Therapy:  is not on home oxygen therapy.   Ventilator:    - No ventilator support    Rehab Therapies: Physical Therapy and Occupational Therapy  Weight Bearing Status/Restrictions: No weight bearing restrictions  Other Medical Equipment (for information only, NOT a DME order):  ***lift   Other Treatments: ***    Patient's personal belongings (please select all that are sent with patient):  None    RN SIGNATURE:  Electronically signed by Caron Licona on 9/28/22 at 7:28 PM EDT    CASE MANAGEMENT/SOCIAL WORK SECTION    Inpatient Status Date: 9/24/22    Readmission Risk Assessment Score:  Readmission Risk              Risk of Unplanned Readmission:  21         Discharging to Facility/ Agency   Name: [facility]  Address:  [address] [place] [address], [address]   Phone:  [phone]  Fax:  [phone]    / signature: Electronically signed by Caron Connor RN on 9/28/22 at 3:33 PM EDT    PHYSICIAN SECTION    Prognosis: Fair    Condition at Discharge: Stable    Rehab Potential (if transferring to Rehab): Fair    Recommended Labs or Other Treatments After Discharge: pt, ot ,speech therapy    Physician Certification: I certify the above information and transfer of [facility] Room  is necessary for the continuing treatment of the diagnosis listed and that he requires [facility] for greater 30 days.      Update Admission H&P: No change in H&P    PHYSICIAN SIGNATURE:  Electronically signed by Mumtaz Shanks DO on 9/28/22 at 1:46 PM EDT

## 2022-09-29 NOTE — PROGRESS NOTES
Discharge instructions reviewed with pt/family, discussed medications, VU, denies any further questions or anxiety related to discharge. Educational handouts in regards to new medications, given via [employer], side effects discussed, VU. IV/tele discontinued. [place] [facility] [place]. Taken from room via stretcher by EMT, personal belongings taken with. New medications supplied through outpatient pharmacy.

## 2023-06-01 ENCOUNTER — APPOINTMENT (OUTPATIENT)
Dept: CT IMAGING | Age: 78
End: 2023-06-01
Payer: MEDICARE

## 2023-06-01 ENCOUNTER — HOSPITAL ENCOUNTER (EMERGENCY)
Age: 78
Discharge: HOME | End: 2023-06-01
Payer: MEDICARE

## 2023-06-01 VITALS
TEMPERATURE: 98.1 F | RESPIRATION RATE: 15 BRPM | OXYGEN SATURATION: 99 % | DIASTOLIC BLOOD PRESSURE: 113 MMHG | WEIGHT: 182.98 LBS | SYSTOLIC BLOOD PRESSURE: 165 MMHG | HEART RATE: 83 BPM | BODY MASS INDEX: 28.66 KG/M2

## 2023-06-01 DIAGNOSIS — K62.89 PROCTITIS: ICD-10-CM

## 2023-06-01 DIAGNOSIS — K43.9 VENTRAL HERNIA WITHOUT OBSTRUCTION OR GANGRENE: Primary | ICD-10-CM

## 2023-06-01 LAB
ALBUMIN SERPL-MCNC: 3.5 G/DL (ref 3.4–5)
ALBUMIN/GLOB SERPL: 1 {RATIO} (ref 1.1–2.2)
ALP SERPL-CCNC: 136 U/L (ref 40–129)
ALT SERPL-CCNC: <5 U/L (ref 10–40)
ANION GAP SERPL CALCULATED.3IONS-SCNC: 11 MMOL/L (ref 3–16)
AST SERPL-CCNC: 9 U/L (ref 15–37)
BACTERIA URNS QL MICRO: ABNORMAL /HPF
BASOPHILS # BLD: 0.1 K/UL (ref 0–0.2)
BASOPHILS NFR BLD: 0.7 %
BILIRUB SERPL-MCNC: 0.5 MG/DL (ref 0–1)
BILIRUB UR QL STRIP.AUTO: NEGATIVE
BUN SERPL-MCNC: 15 MG/DL (ref 7–20)
CALCIUM SERPL-MCNC: 9.1 MG/DL (ref 8.3–10.6)
CHLORIDE SERPL-SCNC: 109 MMOL/L (ref 99–110)
CLARITY UR: CLEAR
CO2 SERPL-SCNC: 19 MMOL/L (ref 21–32)
COLOR UR: YELLOW
CREAT SERPL-MCNC: 1.7 MG/DL (ref 0.8–1.3)
DEPRECATED RDW RBC AUTO: 16.2 % (ref 12.4–15.4)
EKG DIAGNOSIS: NORMAL
EKG Q-T INTERVAL: 356 MS
EKG QRS DURATION: 82 MS
EKG QTC CALCULATION (BAZETT): 413 MS
EKG R AXIS: -18 DEGREES
EKG T AXIS: 21 DEGREES
EKG VENTRICULAR RATE: 81 BPM
EOSINOPHIL # BLD: 0.2 K/UL (ref 0–0.6)
EOSINOPHIL NFR BLD: 2.3 %
EPI CELLS #/AREA URNS AUTO: 1 /HPF (ref 0–5)
GFR SERPLBLD CREATININE-BSD FMLA CKD-EPI: 40 ML/MIN/{1.73_M2}
GLUCOSE SERPL-MCNC: 135 MG/DL (ref 70–99)
GLUCOSE UR STRIP.AUTO-MCNC: NEGATIVE MG/DL
HCT VFR BLD AUTO: 33.9 % (ref 40.5–52.5)
HEMOCCULT STL QL: NORMAL
HGB BLD-MCNC: 10.7 G/DL (ref 13.5–17.5)
HGB UR QL STRIP.AUTO: ABNORMAL
HYALINE CASTS #/AREA URNS AUTO: 0 /LPF (ref 0–8)
KETONES UR STRIP.AUTO-MCNC: NEGATIVE MG/DL
LEUKOCYTE ESTERASE UR QL STRIP.AUTO: ABNORMAL
LIPASE SERPL-CCNC: 45 U/L (ref 13–60)
LYMPHOCYTES # BLD: 1.1 K/UL (ref 1–5.1)
LYMPHOCYTES NFR BLD: 11.5 %
MCH RBC QN AUTO: 28.4 PG (ref 26–34)
MCHC RBC AUTO-ENTMCNC: 31.5 G/DL (ref 31–36)
MCV RBC AUTO: 90.1 FL (ref 80–100)
MONOCYTES # BLD: 0.4 K/UL (ref 0–1.3)
MONOCYTES NFR BLD: 4.4 %
NEUTROPHILS # BLD: 7.7 K/UL (ref 1.7–7.7)
NEUTROPHILS NFR BLD: 81.1 %
NITRITE UR QL STRIP.AUTO: NEGATIVE
PH UR STRIP.AUTO: 5 [PH] (ref 5–8)
PLATELET # BLD AUTO: 266 K/UL (ref 135–450)
PMV BLD AUTO: 8.8 FL (ref 5–10.5)
POTASSIUM SERPL-SCNC: 4.2 MMOL/L (ref 3.5–5.1)
PROT SERPL-MCNC: 7.1 G/DL (ref 6.4–8.2)
PROT UR STRIP.AUTO-MCNC: 100 MG/DL
RBC # BLD AUTO: 3.76 M/UL (ref 4.2–5.9)
RBC CLUMPS #/AREA URNS AUTO: 3 /HPF (ref 0–4)
SODIUM SERPL-SCNC: 139 MMOL/L (ref 136–145)
SP GR UR STRIP.AUTO: 1.01 (ref 1–1.03)
UA COMPLETE W REFLEX CULTURE PNL UR: ABNORMAL
UA DIPSTICK W REFLEX MICRO PNL UR: YES
URN SPEC COLLECT METH UR: ABNORMAL
UROBILINOGEN UR STRIP-ACNC: 0.2 E.U./DL
WBC # BLD AUTO: 9.6 K/UL (ref 4–11)
WBC #/AREA URNS AUTO: 6 /HPF (ref 0–5)

## 2023-06-01 PROCEDURE — 6360000004 HC RX CONTRAST MEDICATION: Performed by: PHYSICIAN ASSISTANT

## 2023-06-01 PROCEDURE — 80053 COMPREHEN METABOLIC PANEL: CPT

## 2023-06-01 PROCEDURE — 82270 OCCULT BLOOD FECES: CPT

## 2023-06-01 PROCEDURE — 93010 ELECTROCARDIOGRAM REPORT: CPT | Performed by: INTERNAL MEDICINE

## 2023-06-01 PROCEDURE — 85025 COMPLETE CBC W/AUTO DIFF WBC: CPT

## 2023-06-01 PROCEDURE — 81001 URINALYSIS AUTO W/SCOPE: CPT

## 2023-06-01 PROCEDURE — 99285 EMERGENCY DEPT VISIT HI MDM: CPT

## 2023-06-01 PROCEDURE — 83690 ASSAY OF LIPASE: CPT

## 2023-06-01 PROCEDURE — 74177 CT ABD & PELVIS W/CONTRAST: CPT

## 2023-06-01 PROCEDURE — 93005 ELECTROCARDIOGRAM TRACING: CPT | Performed by: EMERGENCY MEDICINE

## 2023-06-01 RX ADMIN — IOPAMIDOL 75 ML: 755 INJECTION, SOLUTION INTRAVENOUS at 12:03

## 2023-06-01 ASSESSMENT — PAIN SCALES - GENERAL: PAINLEVEL_OUTOF10: 4

## 2023-06-01 ASSESSMENT — PAIN DESCRIPTION - DESCRIPTORS: DESCRIPTORS: SORE

## 2023-06-01 ASSESSMENT — PAIN DESCRIPTION - LOCATION: LOCATION: ABDOMEN

## 2023-06-01 ASSESSMENT — PAIN - FUNCTIONAL ASSESSMENT
PAIN_FUNCTIONAL_ASSESSMENT: NONE - DENIES PAIN
PAIN_FUNCTIONAL_ASSESSMENT: 0-10

## 2023-06-01 NOTE — ED PROVIDER NOTES
**ADVANCED PRACTICE PROVIDER, I HAVE EVALUATED THIS PATIENT**        [facility] ENCOUNTER      Pt Name: Haritha Boykin  DSZ:[id number]  [place] 1945  Date of evaluation: 6/1/2023  Provider: Augustin Rios PA-C  Note Started: 3:53 PM EDT 6/1/2023        Chief Complaint:    Chief Complaint   Patient presents with    Abdominal Pain     Started 3 days ago, c/o rectal bleeding          Nursing Notes, Past Medical Hx, Past Surgical Hx, Social Hx, Allergies, and Family Hx were all reviewed and agreed with or any disagreements were addressed in the HPI.    HPI: (Location, Duration, Timing, Severity, Quality, Assoc Sx, Context, Modifying factors)    History From: Patient  Limitations to history : None    Social Determinants Significantly Affecting Health : None    Chief Complaint of abdominal pain. Patient complain of having more generalized upper abdominal pain for the last 2 days. Tasia Gain his girlfriend noticed some blood in the stool. He denies nausea vomiting, no chest pain, no extremity pain or weakness. No lightheaded dizziness. No diarrhea or constipation. No other complaints. Denies fever. This is a  76 y.o. male who presents to emergency room with above complaint.     PastMedical/Surgical History:      Diagnosis Date    Cerebral artery occlusion with cerebral infarction ([facility] [address]     Diabetes mellitus ([facility] [address]     Hypertension          Procedure Laterality Date    COLECTOMY N/A 12/15/2021    [facility] 11/13/2021    COLONOSCOPY DIAGNOSTIC performed by Verenice Carter MD at [address] [place] [address] N/A 12/12/2021    COLONOSCOPY FLEXIBLE W/ DECOMPRESSION performed by Narciso Mcneal MD at [facility] ENDOSCOPY       Medications:  Previous Medications    ALLOPURINOL (ZYLOPRIM) 100 MG TABLET    Take 100 mg by mouth daily    APIXABAN (ELIQUIS) 5 MG TABS TABLET    Take 5 mg by

## 2023-06-01 NOTE — DISCHARGE INSTRUCTIONS
Use prescribed occasion as prescribed only for proctitis  Follow-up with Dr. Eri Slaas gastroenterologist for your proctitis  Follow-up with Dr. Todd Moses general surgeon for your abdominal hernia  Return for any worsening

## 2023-06-01 NOTE — CARE COORDINATION
[facility]  Patient is [facility] with [facility] for nurse, PT/OT. Will follow for discharge to home with orders to resume care.       Hermilo Colon RN, BSN CTN  [facility] [phone]

## 2023-06-04 ASSESSMENT — ENCOUNTER SYMPTOMS
SORE THROAT: 0
NAUSEA: 0
SHORTNESS OF BREATH: 0
BACK PAIN: 0
VOMITING: 0
BLOOD IN STOOL: 1
COUGH: 0
EYE PAIN: 0
ABDOMINAL PAIN: 1

## 2024-10-10 NOTE — PROGRESS NOTES
DISCHARGE SUMMARY:   Patient seen and examined. Probable diagnosis, differential diagnosis, treatment, treatment options, and probable complications were discussed and explained to patient and her mother. she was to take medication/s associated with this visit. she may take over-the-counter pain and/or fever medication if needed. Advised increased oral fluid intake (2 liters of water or more per day). Reinforced to continue personal hygiene. Patient to return to clinic if there is worsening or persistence of symptoms. Patient and her mother verbalized understanding.    Patient to come back in 7 - 10 days if needed for worsening symptoms.       Hospitalist Progress Note      PCP: Gaetano Goltz, APRN - CNP    Date of Admission: 12/12/2021    Chief Complaint: abdominal pain    Hospital Course: 76m admitted with recurrence of abdominal pain secondary to sigmoid volvulus    Subjective: Patient seen on room air, no distress, denies fever cough or uncontrolled abd pain, tolerating po      Medications:  Reviewed    Infusion Medications    sodium chloride      sodium chloride 50 mL/hr at 12/18/21 0600    heparin (PORCINE) Infusion 1,380 Units/hr (12/18/21 2110)    dextrose       Scheduled Medications    levofloxacin  250 mg IntraVENous Q24H    cloNIDine  1 patch TransDERmal Weekly    pantoprazole  40 mg IntraVENous Daily    And    sodium chloride (PF)  10 mL IntraVENous Daily    sodium chloride flush  10 mL IntraVENous 2 times per day    insulin lispro  0-12 Units SubCUTAneous 6 times per day     PRN Meds: morphine **OR** morphine, hydrALAZINE, sodium chloride flush, sodium chloride, potassium chloride **OR** potassium alternative oral replacement **OR** potassium chloride, potassium chloride, magnesium sulfate, promethazine **OR** ondansetron, magnesium hydroxide, acetaminophen **OR** acetaminophen, labetalol, glucose, dextrose, glucagon (rDNA), dextrose      Intake/Output Summary (Last 24 hours) at 12/19/2021 1143  Last data filed at 12/18/2021 2154  Gross per 24 hour   Intake --   Output 275 ml   Net -275 ml       Physical Exam Performed:    BP (!) 180/58   Pulse 57   Temp 98.6 °F (37 °C)   Resp 16   Ht 5' 9\" (1.753 m)   Wt 216 lb 14.9 oz (98.4 kg)   SpO2 97%   BMI 32.04 kg/m²     General appearance: No apparent distress, appears stated age and cooperative. HEENT: Pupils equal, round, and reactive to light. Conjunctivae/corneas clear. Neck: Supple, with full range of motion. No jugular venous distention. Trachea midline.   Respiratory:  Normal respiratory effort, no use of accessory muscles, no intercostal retractions  Cardiovascular: Regular rate and rhythm   Abdomen: Soft, non-tender, non-distended    Musculoskeletal: No clubbing, cyanosis or edema bilaterally. Skin: Skin color, texture, turgor normal.     Neurologic:  R sided weakness  Psychiatric: Alert and oriented        Labs:   Recent Labs     12/17/21 0434 12/17/21 0434 12/18/21  0442 12/18/21  1159 12/19/21  0439   WBC 5.8  --  6.6  --  6.7   HGB 8.0*   < > 7.9* 8.5* 7.9*   HCT 24.9*   < > 24.6* 28.4* 24.6*   *  --  134*  --  142    < > = values in this interval not displayed. Recent Labs     12/17/21 0434 12/17/21 0434 12/18/21 0441 12/19/21 0439     --  139 141   K 3.4*  3.4*   < > 3.6  3.6 3.5  3.5   *  --  112* 112*   CO2 15*  --  15* 16*   BUN 26*  --  22* 18   CREATININE 2.0*  --  1.8* 1.6*   CALCIUM 8.0*  --  8.0* 8.0*   PHOS 2.6  --  2.3* 2.9    < > = values in this interval not displayed. No results for input(s): AST, ALT, BILIDIR, BILITOT, ALKPHOS in the last 72 hours. No results for input(s): INR in the last 72 hours. No results for input(s): Luis Fernando Shorts in the last 72 hours. Urinalysis:      Lab Results   Component Value Date    NITRU Negative 12/16/2021    WBCUA 10 12/16/2021    RBCUA 2 12/16/2021    BLOODU LARGE 12/16/2021    SPECGRAV 1.013 12/16/2021    GLUCOSEU Negative 12/16/2021       Radiology:  CT ABDOMEN PELVIS WO CONTRAST Additional Contrast? None   Final Result   Recurrent or ongoing sigmoid volvulus with mild surrounding inflammatory fat   stranding at the mildly left site, similar to prior. No pneumatosis, free   fluid, free air, or mesenteric or portal venous gas. Sigmoid diverticulosis, without evidence of acute diverticulitis. Mild prostate gland enlargement.       RECOMMENDATIONS:   Unavailable                 Assessment/Plan:    Active Hospital Problems    Diagnosis Date Noted    Abdominal pain [R10.9] 12/12/2021    Sigmoid volvulus ([facility] [address] [K56.2] 11/13/2021 Recurrent Volvulus  - s/p OR  - (+) flatus, no /v    Afib  - resume eliquis      HTN  - continue catapres patch  - adding prn IV hydralazine    GERD   continue PPI    GILL  - appears at baseline crea 1.8, trace bipedal edema, dec ivf    Leukocytosis  - reslved, urine cx neg, dc levaquin  Diet: ADULT ORAL NUTRITION SUPPLEMENT; Breakfast, Dinner; Clear Liquid Oral Supplement  ADULT DIET;  Full Liquid  Code Status: Full Code         Domenica Alanis MD

## (undated) DEVICE — COVER,MAYO STAND,STERILE: Brand: MEDLINE

## (undated) DEVICE — ENDOSCOPY KIT: Brand: MEDLINE INDUSTRIES, INC.

## (undated) DEVICE — GLOVE ORANGE PI 7   MSG9070

## (undated) DEVICE — SUTURE PDS II SZ 3-0 L27IN ABSRB VLT L26MM SH 1/2 CIR Z316H

## (undated) DEVICE — ELECTRODE BLDE L6.5IN CAUT EXT DISP

## (undated) DEVICE — COVER LT HNDL BLU PLAS

## (undated) DEVICE — STAPLER INT L75MM CUT LN L73MM STPL LN L77MM BLU B FRM 8

## (undated) DEVICE — YANKAUER,BULB TIP,W/O VENT,RIGID,STERILE: Brand: MEDLINE

## (undated) DEVICE — CLEANER,CAUTERY TIP,2X2",STERILE: Brand: MEDLINE

## (undated) DEVICE — COUNTER NDL 40 COUNT HLD 70 NUM FOAM BLK SGL MAG W BLDE REMV

## (undated) DEVICE — GOWN SIRUS NONREIN XL W/TWL: Brand: MEDLINE INDUSTRIES, INC.

## (undated) DEVICE — TOWEL,OR,DSP,ST,BLUE,STD,4/PK,20PK/CS: Brand: MEDLINE

## (undated) DEVICE — SPONGE LAP W18XL18IN WHT COT 4 PLY FLD STRUNG RADPQ DISP ST

## (undated) DEVICE — SPONGE GZ W4XL4IN COT 12 PLY TYP VII WVN C FLD DSGN

## (undated) DEVICE — SUTURE VCRL SZ 3-0 L27IN ABSRB UD L26MM SH 1/2 CIR J416H

## (undated) DEVICE — STAPLER SKIN H3.9MM WIRE DIA0.58MM CRWN 6.9MM 35 STPL FIX

## (undated) DEVICE — SOLUTION IV IRRIG POUR BRL 0.9% SODIUM CHL 2F7124

## (undated) DEVICE — SEALER ENDOSCP NANO COAT OPN DIV CRV L JAW LIGASURE IMPACT

## (undated) DEVICE — SHEET, T, LAPAROTOMY, STERILE: Brand: MEDLINE

## (undated) DEVICE — TOTAL TRAY, 16FR 10ML SIL FOLEY, URN: Brand: MEDLINE

## (undated) DEVICE — SUTURE VCRL SZ 3-0 L18IN ABSRB UD W/O NDL POLYGLACTIN 910 J110T

## (undated) DEVICE — MAJOR SET UP: Brand: MEDLINE INDUSTRIES, INC.

## (undated) DEVICE — TUBING, SUCTION, 1/4" X 12', STRAIGHT: Brand: MEDLINE

## (undated) DEVICE — DRAPE,MINOR PROC,6X6 FEN, STER: Brand: MEDLINE

## (undated) DEVICE — RELOAD STPL L75MM OPN H3.8MM CLS 1.5MM WIRE DIA0.2MM REG

## (undated) DEVICE — GARMENT COMPR STD FOR 17IN CALF UNIF THER FLOTRN

## (undated) DEVICE — PENCIL ES L3M BTTN SWCH S STL HEX LOK BLDE ELECTRD HOLSTER

## (undated) DEVICE — COVER,TABLE,77X90,STERILE: Brand: MEDLINE